# Patient Record
Sex: MALE | Race: WHITE | NOT HISPANIC OR LATINO | Employment: OTHER | URBAN - METROPOLITAN AREA
[De-identification: names, ages, dates, MRNs, and addresses within clinical notes are randomized per-mention and may not be internally consistent; named-entity substitution may affect disease eponyms.]

---

## 2018-03-28 ENCOUNTER — OFFICE VISIT (OUTPATIENT)
Dept: CARDIOLOGY | Facility: CLINIC | Age: 73
End: 2018-03-28

## 2018-03-28 VITALS
HEIGHT: 71 IN | BODY MASS INDEX: 43.96 KG/M2 | DIASTOLIC BLOOD PRESSURE: 90 MMHG | HEART RATE: 63 BPM | WEIGHT: 314 LBS | SYSTOLIC BLOOD PRESSURE: 160 MMHG

## 2018-03-28 DIAGNOSIS — R93.1 ABNORMAL NUCLEAR CARDIAC IMAGING TEST: Primary | ICD-10-CM

## 2018-03-28 PROCEDURE — 99204 OFFICE O/P NEW MOD 45 MIN: CPT | Performed by: INTERNAL MEDICINE

## 2018-03-30 PROCEDURE — 93000 ELECTROCARDIOGRAM COMPLETE: CPT | Performed by: INTERNAL MEDICINE

## 2018-03-30 NOTE — PROGRESS NOTES
Subjective:     Encounter Date:03/28/2018      Patient ID: Jake Alberts is a 72 y.o. male.    Chief Complaint:  Shortness of Breath   This is a recurrent problem. The current episode started more than 1 month ago. The problem occurs intermittently. The problem has been waxing and waning. Exacerbated by: Occurs in the evening with rest.     This is a 72-year-old gentleman who was referred to my office today for evaluation of both dyspnea and chest discomfort.  He describes the shortness of breath occurring mostly in the evening when he is at rest.  He says is increasing in frequency.  He underwent a nuclear perfusion study in February 2018.  Stress test was read with a normal ejection fraction and a small fixed apical defect with no evidence of ischemia.  With this stress test he was referred for further evaluation from a cardiovascular standpoint.      Review of Systems   Respiratory: Positive for shortness of breath.    Musculoskeletal: Positive for joint pain.   All other systems reviewed and are negative.        ECG 12 Lead  Date/Time: 3/30/2018 1:26 PM  Performed by: RAINE LUNDY  Authorized by: RAINE LUNDY   Comparison: compared with previous ECG from 1/29/2015  Rhythm: sinus rhythm  T flattening: V3, V4, V5 and V6  Clinical impression: abnormal ECG               Objective:     Physical Exam   Constitutional: He is oriented to person, place, and time. He appears well-developed.   HENT:   Head: Normocephalic.   Eyes: Conjunctivae are normal.   Neck: Normal range of motion.   Cardiovascular: Normal rate, regular rhythm and normal heart sounds.    Pulmonary/Chest: Breath sounds normal.   Abdominal: Soft. Bowel sounds are normal.   Musculoskeletal: Normal range of motion. He exhibits no edema.   Neurological: He is alert and oriented to person, place, and time.   Skin: Skin is warm and dry.   Psychiatric: He has a normal mood and affect. His behavior is normal.   Vitals reviewed.      Lab  Review:       Assessment:         No diagnosis found.       Plan:       1.  Shortness of breath.  Patient's echocardiogram was normal.  The stress test represents a low risk stress test.  At this point I do not think his chest discomfort/shortness of breath is secondary to a cardiac etiology.  I encouraged exercise see what evolves.  He also has been recently diagnosed with sleep apnea and is in the process of getting set up for his CPAP mask.  Another issue that could be the etiology of his symptoms is reflux.  I would defer this to your office.  At this point, we will get treated for sleep apnea encouraged exercise follow-up in 3 months for reassessment and see what clinically evolves.  I did review the records from Dr. Farooq's his prior cardiologist.  He was cathetered back in 2000 and also in 2002 he had a small inferior defect at that time.  I believe these artifacts we'll see what evolves reassess in 3 months

## 2018-07-03 ENCOUNTER — OFFICE VISIT (OUTPATIENT)
Dept: CARDIOLOGY | Facility: CLINIC | Age: 73
End: 2018-07-03

## 2018-07-03 VITALS
SYSTOLIC BLOOD PRESSURE: 150 MMHG | DIASTOLIC BLOOD PRESSURE: 90 MMHG | HEIGHT: 71 IN | BODY MASS INDEX: 43.12 KG/M2 | WEIGHT: 308 LBS | HEART RATE: 61 BPM

## 2018-07-03 DIAGNOSIS — I10 ESSENTIAL HYPERTENSION: ICD-10-CM

## 2018-07-03 DIAGNOSIS — I25.10 CORONARY ARTERY DISEASE INVOLVING NATIVE CORONARY ARTERY OF NATIVE HEART WITHOUT ANGINA PECTORIS: Primary | ICD-10-CM

## 2018-07-03 PROCEDURE — 99213 OFFICE O/P EST LOW 20 MIN: CPT | Performed by: INTERNAL MEDICINE

## 2018-07-03 PROCEDURE — 93000 ELECTROCARDIOGRAM COMPLETE: CPT | Performed by: INTERNAL MEDICINE

## 2018-07-03 RX ORDER — PANTOPRAZOLE SODIUM 40 MG/1
40 TABLET, DELAYED RELEASE ORAL DAILY
Qty: 30 TABLET | Refills: 0 | Status: SHIPPED | OUTPATIENT
Start: 2018-07-03 | End: 2020-02-05

## 2018-07-03 NOTE — PROGRESS NOTES
Subjective:     Encounter Date:03/28/2018      Patient ID: Jake Alberts is a 72 y.o. male.    Chief Complaint:  Shortness of Breath   This is a recurrent problem. The current episode started more than 1 month ago. The problem occurs intermittently. The problem has been waxing and waning. Exacerbated by: Occurs in the evening with rest.     This is a 72-year-old gentleman who was referred to my office today for evaluation of both dyspnea and chest discomfort.  He describes the shortness of breath occurring mostly in the evening when he is at rest.  He says is increasing in frequency.  He underwent a nuclear perfusion study in February 2018.  Stress test was read with a normal ejection fraction and a small fixed apical defect with no evidence of ischemia.  With this stress test he was referred for further evaluation from a cardiovascular standpoint.      Review of Systems   Respiratory: Positive for shortness of breath.    Musculoskeletal: Positive for joint pain.   All other systems reviewed and are negative.        ECG 12 Lead  Date/Time: 7/3/2018 11:54 AM  Performed by: RAINE LUNDY  Authorized by: RAINE LUNDY   Comparison: compared with previous ECG from 3/28/2018  Similar to previous ECG  Rhythm: sinus rhythm  Clinical impression: normal ECG               Objective:     Physical Exam   Constitutional: He is oriented to person, place, and time. He appears well-developed.   HENT:   Head: Normocephalic.   Eyes: Conjunctivae are normal.   Neck: Normal range of motion.   Cardiovascular: Normal rate, regular rhythm and normal heart sounds.    Pulmonary/Chest: Breath sounds normal.   Abdominal: Soft. Bowel sounds are normal.   Musculoskeletal: Normal range of motion. He exhibits no edema.   Neurological: He is alert and oriented to person, place, and time.   Skin: Skin is warm and dry.   Psychiatric: He has a normal mood and affect. His behavior is normal.   Vitals reviewed.      Lab Review:        Assessment:          Diagnosis Plan   1. Coronary artery disease involving native coronary artery of native heart without angina pectoris     2. Essential hypertension            Plan:       1.  Patient continues to have some chest discomfort he thinks it could be indigestion and has not changed since I saw him last.  His EKG has also not changed.  I was able to get his perfusion study that showed a small apical defect that could represent some thinning.  In light of that I think the best approach is to try an empiric trial of Protonix.  If this does not work patient was told to contact me back in about a month and I would set him up for cardiac catheterization.  I did explain the risks and benefits.  2.  Hypertension he says his blood pressure was high today he does check at home frequently and it is running lower.  3.  Mitral regurgitation stable        Coronary Artery Disease  Assessment  • The patient has no angina    Plan  • Lifestyle modifications discussed include adhering to a heart healthy diet, avoidance of tobacco products, maintenance of a healthy weight, medication compliance, regular exercise and regular monitoring of cholesterol and blood pressure    Subjective - Objective  • Current antiplatelet therapy includes aspirin 81 mg

## 2018-12-10 ENCOUNTER — OFFICE VISIT (OUTPATIENT)
Dept: CARDIOLOGY | Facility: CLINIC | Age: 73
End: 2018-12-10

## 2018-12-10 VITALS
WEIGHT: 304 LBS | DIASTOLIC BLOOD PRESSURE: 60 MMHG | SYSTOLIC BLOOD PRESSURE: 120 MMHG | HEIGHT: 70 IN | HEART RATE: 62 BPM | BODY MASS INDEX: 43.52 KG/M2

## 2018-12-10 DIAGNOSIS — I25.10 CORONARY ARTERY DISEASE INVOLVING NATIVE CORONARY ARTERY OF NATIVE HEART WITHOUT ANGINA PECTORIS: ICD-10-CM

## 2018-12-10 DIAGNOSIS — Z99.89 OSA ON CPAP: ICD-10-CM

## 2018-12-10 DIAGNOSIS — G47.33 OSA ON CPAP: ICD-10-CM

## 2018-12-10 DIAGNOSIS — R06.09 DYSPNEA ON EXERTION: ICD-10-CM

## 2018-12-10 DIAGNOSIS — R93.1 ABNORMAL NUCLEAR CARDIAC IMAGING TEST: Primary | ICD-10-CM

## 2018-12-10 DIAGNOSIS — I10 ESSENTIAL HYPERTENSION: ICD-10-CM

## 2018-12-10 DIAGNOSIS — R07.89 ATYPICAL CHEST PAIN: ICD-10-CM

## 2018-12-10 DIAGNOSIS — E66.01 MORBID OBESITY WITH BMI OF 40.0-44.9, ADULT (HCC): ICD-10-CM

## 2018-12-10 PROCEDURE — 99214 OFFICE O/P EST MOD 30 MIN: CPT | Performed by: NURSE PRACTITIONER

## 2018-12-10 PROCEDURE — 93000 ELECTROCARDIOGRAM COMPLETE: CPT | Performed by: NURSE PRACTITIONER

## 2018-12-10 NOTE — H&P (VIEW-ONLY)
Date of Office Visit: 12/10/2018  Encounter Provider: RENETTA Norman  Place of Service: [facility] CARDIOLOGY  Patient Name: Jake Alberts  :1945    Chief Complaint   Patient presents with   • Coronary Artery Disease   • Hypertension   • Sleep Apnea   :     HPI: Jake Alberts is a 73 y.o. male is a patient of Dr. Tinsley. I am seeing him today for the first time and have reviewed his record.     His past medical history is significant of hypertension, hyperlipidemia, TIA, diastolic dysfunction,coronary artery disease, RAFAEL, BPH, and abnormal nuclear cardiac imaging in 2018.  He has a significant history of coronary artery disease with his siblings and mother and father who both  of heart attack.  He quit smoking in .    He a history of negative cardiac catheterization in .     In approximately  he had a TIA and was on Aggrenox for approximately 3 years and then back on aspirin only.    Stress exercise Myoview in 2012 which had ischemic changes which were indeterminate and there was a questionable area of inferior ischemia versus artifact but normal LV systolic function EF 68%.    He began to have dyspnea and chest discomfort.  Shortness of breath occurred mostly in the evening with rest.  This was increasing in frequency and he underwent a nuclear perfusion study in 2018 which showed normal ejection fraction and a small fixed apical defect with no evidence of ischemia.  The echocardiogram was normal.  He was referred for cardiac evaluation area echocardiogram showed normal sinus rhythm with T-wave flattening in V3-V6.    Patient presents today with continued complaint of sharp chest pain which lasted 30 seconds and occurs daily at rest.  He experiences mostly in the evening in the midsternal to left chest area.  Does not have an exertional component however he has shortness of breath with exertion which is also unchanged.  He has  "palpitations which are unchanged.  He wears support socks to manage edema.  He occasionally has lightheadedness when he bends over.  He has chronic back pain and that is why he does not exercise.  Continues to wear CPAP every night.      No Known Allergies    Past Medical History:   Diagnosis Date   • Anemia    • CAD (coronary artery disease)    • Chest pain    • Diastolic dysfunction     Stage I   • Dizziness and giddiness    • Dyspnea    • Hyperlipidemia    • Hypertension    • Left ventricular hypertrophy     Mild concentric   • Mitral regurgitation     Trace   • Pes anserine bursitis    • Prostate cancer (CMS/HCC)    • Rotator cuff tendinitis    • Sinusitis    • TIA (transient ischemic attack) 2009   • Tricuspid regurgitation        Past Surgical History:   Procedure Laterality Date   • CORONARY ANGIOPLASTY  07/2000    JH neg-normal coronaries & LVSF, ?DD, Dr Loo   • KNEE SURGERY  1966   • PROSTATE SURGERY  2014   • SHOULDER SURGERY  2013         Family and social history reviewed.     Review of Systems   Constitution: Positive for malaise/fatigue.   Cardiovascular: Positive for chest pain, dyspnea on exertion and palpitations.   Respiratory: Positive for shortness of breath.      All other systems were reviewed and are negative          Objective:     Vitals:    12/10/18 1352   BP: 120/60   BP Location: Left arm   Patient Position: Sitting   Pulse: 62   Weight: (!) 138 kg (304 lb)   Height: 177.8 cm (70\")     Body mass index is 43.62 kg/m².    PHYSICAL EXAM:  Physical Exam   Constitutional: He is oriented to person, place, and time. He appears well-developed and well-nourished. No distress.   obese   HENT:   Head: Normocephalic.   Eyes: Conjunctivae are normal.   glasses   Neck: Normal range of motion. No JVD present.   Cardiovascular: Normal rate, regular rhythm, normal heart sounds and intact distal pulses.   No murmur heard.  Pulses:       Carotid pulses are 2+ on the right side, and 2+ on the left " side.       Radial pulses are 2+ on the right side, and 2+ on the left side.        Posterior tibial pulses are 2+ on the right side, and 2+ on the left side.   Pulmonary/Chest: Effort normal and breath sounds normal. No respiratory distress. He has no wheezes. He has no rhonchi. He has no rales. He exhibits no tenderness.   Abdominal: Soft. Bowel sounds are normal. He exhibits no distension.   Musculoskeletal: Normal range of motion. He exhibits no edema.   Neurological: He is alert and oriented to person, place, and time.   Skin: Skin is warm, dry and intact. No rash noted. He is not diaphoretic. No cyanosis.   Psychiatric: He has a normal mood and affect. His behavior is normal. Judgment and thought content normal.         ECG 12 Lead  Date/Time: 12/10/2018 2:50 PM  Performed by: Carmen Guerrier APRN  Authorized by: Carmen Guerrier APRN   Comparison: compared with previous ECG from 7/3/2018  Similar to previous ECG  Rhythm: sinus rhythm  Rate: normal  QRS axis: normal  Other findings: prolonged QTc interval  Clinical impression: non-specific ECG  Comments: artifact            Current Outpatient Medications   Medication Sig Dispense Refill   • ASPIR-LOW 81 MG EC tablet      • atenolol (TENORMIN) 50 MG tablet      • Ferrous Gluconate (IRON 27 PO) Take  by mouth.     • lisinopril (PRINIVIL,ZESTRIL) 40 MG tablet      • NIFEdipine CC (ADALAT CC) 60 MG 24 hr tablet      • oxybutynin XL (DITROPAN XL) 15 MG 24 hr tablet      • pantoprazole (PROTONIX) 40 MG EC tablet Take 1 tablet by mouth Daily. 30 tablet 0   • triamterene-hydrochlorothiazide (MAXZIDE-25) 37.5-25 MG per tablet      • vitamin B-12 (CYANOCOBALAMIN) 1000 MCG tablet Take 1,000 mcg by mouth Daily.       No current facility-administered medications for this visit.      Assessment:       Diagnosis Plan   1. Abnormal nuclear cardiac imaging test  Case Request Cath Lab: Left Heart Cath   2. Coronary artery disease involving native coronary artery of native heart  without angina pectoris     3. Essential hypertension     4. RAFAEL on CPAP     5. Morbid obesity with BMI of 40.0-44.9, adult (CMS/[facility])     6. Dyspnea on exertion     7. Atypical chest pain          No orders of the defined types were placed in this encounter.        Plan:         1.  Coronary artery disease with history of cardiac catheterization in 2000.  Myoview exercise stress March 2012 showed ischemic changes with small area of inferior ischemia versus artifact.  Perfusion stress test abnormal February 2018 with small fixed apical defect without ischemia.  He continues to have atypical chest pain as well as shortness of breath with exertion.  He is intermediate risk and will proceed with cardiac catheterization.  2.  Hypertension-controlled continue the same    3.  Hyperlipidemia on pravastatin 40 mg.  He has had his lipid panel checked this year will contact PCP to obtain those records.  4.  TIA in 2009 formerly treated with Aggrenox ×3 years maintained on aspirin 81 mg daily  5.  Sleep apnea compliant with CPAP  6.  Morbid obesity encouraged increased exercise, diet modification, limiting portion size and weight loss.  7.  Reported history of prediabetes       Plan of care reviewed with Dr. Tinsley      It has been a pleasure to participate in this patient's care.      Thank you,  RENETTA Norman      **Live Disclaimer:**  Much of this encounter note is an electronic transcription/translation of spoken language to printed text. The electronic translation of spoken language may permit erroneous, or at times, nonsensical words or phrases to be inadvertently transcribed. Although I have reviewed the note for such errors, some may still exist.

## 2018-12-10 NOTE — PROGRESS NOTES
Date of Office Visit: 12/10/2018  Encounter Provider: RENETTA Norman  Place of Service: [facility] CARDIOLOGY  Patient Name: Jake Alberts  :1945    Chief Complaint   Patient presents with   • Coronary Artery Disease   • Hypertension   • Sleep Apnea   :     HPI: Jake Alberts is a 73 y.o. male is a patient of Dr. Tinsley. I am seeing him today for the first time and have reviewed his record.     His past medical history is significant of hypertension, hyperlipidemia, TIA, diastolic dysfunction,coronary artery disease, RAFAEL, BPH, and abnormal nuclear cardiac imaging in 2018.  He has a significant history of coronary artery disease with his siblings and mother and father who both  of heart attack.  He quit smoking in .    He a history of negative cardiac catheterization in .     In approximately  he had a TIA and was on Aggrenox for approximately 3 years and then back on aspirin only.    Stress exercise Myoview in 2012 which had ischemic changes which were indeterminate and there was a questionable area of inferior ischemia versus artifact but normal LV systolic function EF 68%.    He began to have dyspnea and chest discomfort.  Shortness of breath occurred mostly in the evening with rest.  This was increasing in frequency and he underwent a nuclear perfusion study in 2018 which showed normal ejection fraction and a small fixed apical defect with no evidence of ischemia.  The echocardiogram was normal.  He was referred for cardiac evaluation area echocardiogram showed normal sinus rhythm with T-wave flattening in V3-V6.    Patient presents today with continued complaint of sharp chest pain which lasted 30 seconds and occurs daily at rest.  He experiences mostly in the evening in the midsternal to left chest area.  Does not have an exertional component however he has shortness of breath with exertion which is also unchanged.  He has  "palpitations which are unchanged.  He wears support socks to manage edema.  He occasionally has lightheadedness when he bends over.  He has chronic back pain and that is why he does not exercise.  Continues to wear CPAP every night.      No Known Allergies    Past Medical History:   Diagnosis Date   • Anemia    • CAD (coronary artery disease)    • Chest pain    • Diastolic dysfunction     Stage I   • Dizziness and giddiness    • Dyspnea    • Hyperlipidemia    • Hypertension    • Left ventricular hypertrophy     Mild concentric   • Mitral regurgitation     Trace   • Pes anserine bursitis    • Prostate cancer (CMS/HCC)    • Rotator cuff tendinitis    • Sinusitis    • TIA (transient ischemic attack) 2009   • Tricuspid regurgitation        Past Surgical History:   Procedure Laterality Date   • CORONARY ANGIOPLASTY  07/2000    JH neg-normal coronaries & LVSF, ?DD, Dr Loo   • KNEE SURGERY  1966   • PROSTATE SURGERY  2014   • SHOULDER SURGERY  2013         Family and social history reviewed.     Review of Systems   Constitution: Positive for malaise/fatigue.   Cardiovascular: Positive for chest pain, dyspnea on exertion and palpitations.   Respiratory: Positive for shortness of breath.      All other systems were reviewed and are negative          Objective:     Vitals:    12/10/18 1352   BP: 120/60   BP Location: Left arm   Patient Position: Sitting   Pulse: 62   Weight: (!) 138 kg (304 lb)   Height: 177.8 cm (70\")     Body mass index is 43.62 kg/m².    PHYSICAL EXAM:  Physical Exam   Constitutional: He is oriented to person, place, and time. He appears well-developed and well-nourished. No distress.   obese   HENT:   Head: Normocephalic.   Eyes: Conjunctivae are normal.   glasses   Neck: Normal range of motion. No JVD present.   Cardiovascular: Normal rate, regular rhythm, normal heart sounds and intact distal pulses.   No murmur heard.  Pulses:       Carotid pulses are 2+ on the right side, and 2+ on the left " side.       Radial pulses are 2+ on the right side, and 2+ on the left side.        Posterior tibial pulses are 2+ on the right side, and 2+ on the left side.   Pulmonary/Chest: Effort normal and breath sounds normal. No respiratory distress. He has no wheezes. He has no rhonchi. He has no rales. He exhibits no tenderness.   Abdominal: Soft. Bowel sounds are normal. He exhibits no distension.   Musculoskeletal: Normal range of motion. He exhibits no edema.   Neurological: He is alert and oriented to person, place, and time.   Skin: Skin is warm, dry and intact. No rash noted. He is not diaphoretic. No cyanosis.   Psychiatric: He has a normal mood and affect. His behavior is normal. Judgment and thought content normal.         ECG 12 Lead  Date/Time: 12/10/2018 2:50 PM  Performed by: Carmen Guerrier APRN  Authorized by: Carmen Guerrier APRN   Comparison: compared with previous ECG from 7/3/2018  Similar to previous ECG  Rhythm: sinus rhythm  Rate: normal  QRS axis: normal  Other findings: prolonged QTc interval  Clinical impression: non-specific ECG  Comments: artifact            Current Outpatient Medications   Medication Sig Dispense Refill   • ASPIR-LOW 81 MG EC tablet      • atenolol (TENORMIN) 50 MG tablet      • Ferrous Gluconate (IRON 27 PO) Take  by mouth.     • lisinopril (PRINIVIL,ZESTRIL) 40 MG tablet      • NIFEdipine CC (ADALAT CC) 60 MG 24 hr tablet      • oxybutynin XL (DITROPAN XL) 15 MG 24 hr tablet      • pantoprazole (PROTONIX) 40 MG EC tablet Take 1 tablet by mouth Daily. 30 tablet 0   • triamterene-hydrochlorothiazide (MAXZIDE-25) 37.5-25 MG per tablet      • vitamin B-12 (CYANOCOBALAMIN) 1000 MCG tablet Take 1,000 mcg by mouth Daily.       No current facility-administered medications for this visit.      Assessment:       Diagnosis Plan   1. Abnormal nuclear cardiac imaging test  Case Request Cath Lab: Left Heart Cath   2. Coronary artery disease involving native coronary artery of native heart  without angina pectoris     3. Essential hypertension     4. RAFAEL on CPAP     5. Morbid obesity with BMI of 40.0-44.9, adult (CMS/[facility])     6. Dyspnea on exertion     7. Atypical chest pain          No orders of the defined types were placed in this encounter.        Plan:         1.  Coronary artery disease with history of cardiac catheterization in 2000.  Myoview exercise stress March 2012 showed ischemic changes with small area of inferior ischemia versus artifact.  Perfusion stress test abnormal February 2018 with small fixed apical defect without ischemia.  He continues to have atypical chest pain as well as shortness of breath with exertion.  He is intermediate risk and will proceed with cardiac catheterization.  2.  Hypertension-controlled continue the same    3.  Hyperlipidemia on pravastatin 40 mg.  He has had his lipid panel checked this year will contact PCP to obtain those records.  4.  TIA in 2009 formerly treated with Aggrenox ×3 years maintained on aspirin 81 mg daily  5.  Sleep apnea compliant with CPAP  6.  Morbid obesity encouraged increased exercise, diet modification, limiting portion size and weight loss.  7.  Reported history of prediabetes       Plan of care reviewed with Dr. Tinsley      It has been a pleasure to participate in this patient's care.      Thank you,  RENETTA Norman      **Live Disclaimer:**  Much of this encounter note is an electronic transcription/translation of spoken language to printed text. The electronic translation of spoken language may permit erroneous, or at times, nonsensical words or phrases to be inadvertently transcribed. Although I have reviewed the note for such errors, some may still exist.

## 2018-12-14 ENCOUNTER — HOSPITAL ENCOUNTER (OUTPATIENT)
Dept: LAB | Facility: HOSPITAL | Age: 73
Discharge: HOME | End: 2018-12-14

## 2018-12-14 LAB
ANION GAP SERPL CALC-SCNC: 12.8 MMOL/L (ref 10–20)
BASOPHILS # BLD AUTO: 0.1 10*3/UL (ref 0–0.2)
BASOPHILS NFR BLD AUTO: 1 % (ref 0–2)
BUN SERPL-MCNC: 23 MG/DL (ref 8–20)
BUN/CREAT SERPL: 19.2 (ref 6.2–20.3)
CALCIUM SERPL-MCNC: 9.2 MG/DL (ref 8.9–10.3)
CHLORIDE SERPL-SCNC: 101 MMOL/L (ref 101–111)
CONV CO2: 25 MMOL/L (ref 22–32)
CREAT UR-MCNC: 1.2 MG/DL (ref 0.7–1.2)
DIFFERENTIAL METHOD BLD: (no result)
EOSINOPHIL # BLD AUTO: 0.2 10*3/UL (ref 0–0.3)
EOSINOPHIL # BLD AUTO: 2 % (ref 0–3)
ERYTHROCYTE [DISTWIDTH] IN BLOOD BY AUTOMATED COUNT: 15.6 % (ref 11.5–14.5)
GLUCOSE SERPL-MCNC: 96 MG/DL (ref 65–99)
HCT VFR BLD AUTO: 40.8 % (ref 40–54)
HGB BLD-MCNC: 13.7 G/DL (ref 14–18)
LYMPHOCYTES # BLD AUTO: 2 10*3/UL (ref 0.8–4.8)
LYMPHOCYTES NFR BLD AUTO: 19 % (ref 18–42)
MCH RBC QN AUTO: 28.7 PG (ref 26–32)
MCHC RBC AUTO-ENTMCNC: 33.4 G/DL (ref 32–36)
MCV RBC AUTO: 86 FL (ref 80–94)
MONOCYTES # BLD AUTO: 0.8 10*3/UL (ref 0.1–1.3)
MONOCYTES NFR BLD AUTO: 8 % (ref 2–11)
NEUTROPHILS # BLD AUTO: 7.5 10*3/UL (ref 2.3–8.6)
NEUTROPHILS NFR BLD AUTO: 70 % (ref 50–75)
NRBC BLD AUTO-RTO: 0 /100{WBCS}
NRBC/RBC NFR BLD MANUAL: 0 10*3/UL
PLATELET # BLD AUTO: 263 10*3/UL (ref 150–450)
PMV BLD AUTO: 8.7 FL (ref 7.4–10.4)
POTASSIUM SERPL-SCNC: 3.8 MMOL/L (ref 3.6–5.1)
RBC # BLD AUTO: 4.75 10*6/UL (ref 4.6–6)
SODIUM SERPL-SCNC: 135 MMOL/L (ref 136–144)
WBC # BLD AUTO: 10.5 10*3/UL (ref 4.5–11.5)

## 2018-12-18 ENCOUNTER — HOSPITAL ENCOUNTER (OUTPATIENT)
Facility: HOSPITAL | Age: 73
Setting detail: HOSPITAL OUTPATIENT SURGERY
Discharge: HOME | End: 2018-12-18
Attending: INTERNAL MEDICINE | Admitting: INTERNAL MEDICINE

## 2018-12-18 VITALS
OXYGEN SATURATION: 95 % | WEIGHT: 300 LBS | SYSTOLIC BLOOD PRESSURE: 147 MMHG | TEMPERATURE: 99.2 F | BODY MASS INDEX: 42.95 KG/M2 | RESPIRATION RATE: 16 BRPM | DIASTOLIC BLOOD PRESSURE: 78 MMHG | HEIGHT: 70 IN | HEART RATE: 58 BPM

## 2018-12-18 DIAGNOSIS — R93.1 ABNORMAL NUCLEAR CARDIAC IMAGING TEST: ICD-10-CM

## 2018-12-18 PROCEDURE — 0 IOPAMIDOL PER 1 ML: Performed by: INTERNAL MEDICINE

## 2018-12-18 PROCEDURE — C1894 INTRO/SHEATH, NON-LASER: HCPCS | Performed by: INTERNAL MEDICINE

## 2018-12-18 PROCEDURE — 99152 MOD SED SAME PHYS/QHP 5/>YRS: CPT | Performed by: INTERNAL MEDICINE

## 2018-12-18 PROCEDURE — 93458 L HRT ARTERY/VENTRICLE ANGIO: CPT | Performed by: INTERNAL MEDICINE

## 2018-12-18 PROCEDURE — 25010000002 HEPARIN (PORCINE) PER 1000 UNITS: Performed by: INTERNAL MEDICINE

## 2018-12-18 PROCEDURE — 25010000002 FENTANYL CITRATE (PF) 100 MCG/2ML SOLUTION: Performed by: INTERNAL MEDICINE

## 2018-12-18 PROCEDURE — 25010000002 MIDAZOLAM PER 1 MG: Performed by: INTERNAL MEDICINE

## 2018-12-18 PROCEDURE — C1769 GUIDE WIRE: HCPCS | Performed by: INTERNAL MEDICINE

## 2018-12-18 RX ORDER — PRAVASTATIN SODIUM 40 MG
40 TABLET ORAL NIGHTLY
COMMUNITY

## 2018-12-18 RX ORDER — LIDOCAINE HYDROCHLORIDE 20 MG/ML
INJECTION, SOLUTION INFILTRATION; PERINEURAL AS NEEDED
Status: DISCONTINUED | OUTPATIENT
Start: 2018-12-18 | End: 2018-12-18 | Stop reason: HOSPADM

## 2018-12-18 RX ORDER — SODIUM CHLORIDE 9 MG/ML
100 INJECTION, SOLUTION INTRAVENOUS CONTINUOUS
Status: DISCONTINUED | OUTPATIENT
Start: 2018-12-18 | End: 2018-12-18 | Stop reason: HOSPADM

## 2018-12-18 RX ORDER — ACETAMINOPHEN 325 MG/1
650 TABLET ORAL EVERY 4 HOURS PRN
Status: CANCELLED | OUTPATIENT
Start: 2018-12-18

## 2018-12-18 RX ORDER — SODIUM CHLORIDE 0.9 % (FLUSH) 0.9 %
3 SYRINGE (ML) INJECTION EVERY 12 HOURS SCHEDULED
Status: DISCONTINUED | OUTPATIENT
Start: 2018-12-18 | End: 2018-12-18 | Stop reason: HOSPADM

## 2018-12-18 RX ORDER — LIDOCAINE HYDROCHLORIDE 10 MG/ML
0.1 INJECTION, SOLUTION EPIDURAL; INFILTRATION; INTRACAUDAL; PERINEURAL ONCE AS NEEDED
Status: DISCONTINUED | OUTPATIENT
Start: 2018-12-18 | End: 2018-12-18 | Stop reason: HOSPADM

## 2018-12-18 RX ORDER — HYDROCODONE BITARTRATE AND ACETAMINOPHEN 5; 325 MG/1; MG/1
1 TABLET ORAL EVERY 4 HOURS PRN
Status: CANCELLED | OUTPATIENT
Start: 2018-12-18

## 2018-12-18 RX ORDER — SODIUM CHLORIDE 9 MG/ML
75 INJECTION, SOLUTION INTRAVENOUS CONTINUOUS
Status: DISCONTINUED | OUTPATIENT
Start: 2018-12-18 | End: 2018-12-18 | Stop reason: HOSPADM

## 2018-12-18 RX ORDER — FENTANYL CITRATE 50 UG/ML
INJECTION, SOLUTION INTRAMUSCULAR; INTRAVENOUS AS NEEDED
Status: DISCONTINUED | OUTPATIENT
Start: 2018-12-18 | End: 2018-12-18 | Stop reason: HOSPADM

## 2018-12-18 RX ORDER — MIDAZOLAM HYDROCHLORIDE 1 MG/ML
INJECTION INTRAMUSCULAR; INTRAVENOUS AS NEEDED
Status: DISCONTINUED | OUTPATIENT
Start: 2018-12-18 | End: 2018-12-18 | Stop reason: HOSPADM

## 2018-12-18 RX ORDER — SODIUM CHLORIDE 0.9 % (FLUSH) 0.9 %
3-10 SYRINGE (ML) INJECTION AS NEEDED
Status: DISCONTINUED | OUTPATIENT
Start: 2018-12-18 | End: 2018-12-18 | Stop reason: HOSPADM

## 2018-12-18 RX ORDER — ONDANSETRON 2 MG/ML
4 INJECTION INTRAMUSCULAR; INTRAVENOUS EVERY 6 HOURS PRN
Status: CANCELLED | OUTPATIENT
Start: 2018-12-18

## 2018-12-18 RX ORDER — ONDANSETRON 4 MG/1
4 TABLET, ORALLY DISINTEGRATING ORAL EVERY 6 HOURS PRN
Status: CANCELLED | OUTPATIENT
Start: 2018-12-18

## 2018-12-18 RX ORDER — ONDANSETRON 4 MG/1
4 TABLET, FILM COATED ORAL EVERY 6 HOURS PRN
Status: CANCELLED | OUTPATIENT
Start: 2018-12-18

## 2018-12-18 RX ADMIN — SODIUM CHLORIDE 75 ML/HR: 9 INJECTION, SOLUTION INTRAVENOUS at 07:26

## 2018-12-18 NOTE — DISCHARGE INSTRUCTIONS
[facility]  [address] [place] [address]    Coronary Angiogram (Radial/Ulnar Approach) After Care    Refer to this sheet in the next few weeks. These instructions provide you with information on caring for yourself after your procedure. Your caregiver may also give you more specific instructions. Your treatment has been planned according to current medical practices, but problems sometimes occur. Call your caregiver if you have any problems or questions after your procedure.    Home Care Instructions:  · You may shower the day after the procedure. Remove the bandage (dressing) and gently wash the site with plain soap and water. Gently pat the site dry. You may apply a band aid daily for 2 days if desired.    · Do not apply powder or lotion to the site.  · Do not submerge the affected site in water for 3 to 5 days or until the site is completely healed.   · Do not lift, push or pull anything over 10 pounds for 2 days after your procedure.  · Inspect the site at least twice daily. You may notice some bruising at the site and it may be tender for 1 to 2 weeks.     · Increase your fluid intake for the next 2 days.    · Keep arm elevated for 24 hours. For the remainder of the day, keep your arm in “Pledge of Allegiance” position when up and about.     · You may drive 24 hours after the procedure unless otherwise instructed by your caregiver.  · Do not operate machinery or power tools for 24 hours.  · A responsible adult should be with you for the first 24 hours after you arrive home. Do not make any important legal decisions or sign legal papers for 24 hours.  Do not drink alcohol for 24 hours.    · Metformin or any medications containing Metformin should not be taken for 48 hours after your procedure.      Call Your Doctor if:   · You have unusual pain at the radial/ulnar (wrist) site.  · You have redness, warmth, swelling, or pain at the radial/ulnar (wrist) site.  · You have drainage (other  than a small amount of blood on the dressing).  · You have chills or a fever > 101.  · Your arm becomes pale or dark, cool, tingly, or numb.  · You have heavy bleeding from the site, hold pressure on the site for 20 minutes.  If the bleeding stops, apply a fresh bandage and call your cardiologist.  However, if you continue to have bleeding, call 911.

## 2019-02-01 ENCOUNTER — OFFICE VISIT (OUTPATIENT)
Dept: CARDIOLOGY | Facility: CLINIC | Age: 74
End: 2019-02-01

## 2019-02-01 VITALS
HEIGHT: 70 IN | BODY MASS INDEX: 41.66 KG/M2 | WEIGHT: 291 LBS | HEART RATE: 53 BPM | SYSTOLIC BLOOD PRESSURE: 130 MMHG | DIASTOLIC BLOOD PRESSURE: 80 MMHG

## 2019-02-01 DIAGNOSIS — I10 ESSENTIAL HYPERTENSION: Primary | ICD-10-CM

## 2019-02-01 DIAGNOSIS — I25.10 CORONARY ARTERY DISEASE INVOLVING NATIVE CORONARY ARTERY OF NATIVE HEART WITHOUT ANGINA PECTORIS: ICD-10-CM

## 2019-02-01 PROCEDURE — 99213 OFFICE O/P EST LOW 20 MIN: CPT | Performed by: INTERNAL MEDICINE

## 2019-02-01 PROCEDURE — 93000 ELECTROCARDIOGRAM COMPLETE: CPT | Performed by: INTERNAL MEDICINE

## 2019-02-01 NOTE — PROGRESS NOTES
Subjective:     Encounter Date:02/01/2019      Patient ID: Jake Alberts is a 73 y.o. male.    Chief Complaint:  Coronary Artery Disease   Presents for follow-up visit. Pertinent negatives include no chest pain or chest pressure. The symptoms have been stable. Compliance with diet is good. Compliance with exercise is poor. Compliance with medications is good.   Hypertension   This is a chronic problem. The problem is controlled. Pertinent negatives include no chest pain.     73-year-old gentleman who presents today for reevaluation.  he underwent a cardiac catheterization and was found to have mild nonobstructive coronary artery disease.  Clinically he is doing significantly better.  His chest pains have resolved he has a little bit of swelling in his ankles.  Unfortunately he has not been exercising much but this is more due to back discomfort.  He is scheduled to see a physician about this in the next week or so.  Overall he is doing better.    Review of Systems   Cardiovascular: Negative for chest pain.   All other systems reviewed and are negative.        ECG 12 Lead  Date/Time: 2/1/2019 11:04 AM  Performed by: Joshua Tinsley MD  Authorized by: Joshua Tinsley MD   Comparison: compared with previous ECG from 12/10/2018  Similar to previous ECG  Rhythm: sinus bradycardia  Clinical impression: non-specific ECG              Conclusions:   1. Left main: Diffuse 20% stenosis  2. LAD: 20% mid vessel stenosis  3. LCX: 20-30% proximal stenosis  4. RCA: Tubular 40% mid vessel stenosis  5.  Normal left ventricular size and systolic function  6.  Elevated left ventricular end-diastolic pressure.     Recommendations: Weight loss.  Continue medical management.  Objective:     Physical Exam   Constitutional: He is oriented to person, place, and time. He appears well-developed.   HENT:   Head: Normocephalic.   Eyes: Conjunctivae are normal.   Neck: Normal range of motion.   Cardiovascular: Normal rate, regular  rhythm and normal heart sounds.   Pulmonary/Chest: Breath sounds normal.   Abdominal: Soft. Bowel sounds are normal.   Musculoskeletal: Normal range of motion. He exhibits no edema.   Neurological: He is alert and oriented to person, place, and time.   Skin: Skin is warm and dry.   Psychiatric: He has a normal mood and affect. His behavior is normal.   Vitals reviewed.      Lab Review:       Assessment:          Diagnosis Plan   1. Essential hypertension     2. Coronary artery disease involving native coronary artery of native heart without angina pectoris            Plan:       1.  CAD.  Stable encouraged exercise he is going to try however due to orthopedic issues which he is getting evaluated for he is limited.  He is working on losing weight is lost about 9 pounds so far.  2.  Hypertension blood pressure is good  3.  Would see patient back in about a year sooner of course if he develops issues.    Coronary Artery Disease  Assessment  • The patient has no angina    Plan  • Lifestyle modifications discussed include adhering to a heart healthy diet, avoidance of tobacco products, maintenance of a healthy weight, medication compliance, regular exercise and regular monitoring of cholesterol and blood pressure    Subjective - Objective  • Current antiplatelet therapy includes aspirin 81 mg

## 2020-02-05 ENCOUNTER — APPOINTMENT (OUTPATIENT)
Dept: PREADMISSION TESTING | Facility: HOSPITAL | Age: 75
End: 2020-02-05

## 2020-02-05 VITALS
WEIGHT: 308 LBS | OXYGEN SATURATION: 93 % | BODY MASS INDEX: 44.09 KG/M2 | HEIGHT: 70 IN | SYSTOLIC BLOOD PRESSURE: 141 MMHG | RESPIRATION RATE: 16 BRPM | HEART RATE: 55 BPM | DIASTOLIC BLOOD PRESSURE: 68 MMHG | TEMPERATURE: 96.8 F

## 2020-02-05 LAB
ANION GAP SERPL CALCULATED.3IONS-SCNC: 13.8 MMOL/L (ref 5–15)
BUN BLD-MCNC: 23 MG/DL (ref 8–23)
BUN/CREAT SERPL: 21.9 (ref 7–25)
CALCIUM SPEC-SCNC: 9.2 MG/DL (ref 8.6–10.5)
CHLORIDE SERPL-SCNC: 102 MMOL/L (ref 98–107)
CO2 SERPL-SCNC: 25.2 MMOL/L (ref 22–29)
CREAT BLD-MCNC: 1.05 MG/DL (ref 0.76–1.27)
DEPRECATED RDW RBC AUTO: 42.6 FL (ref 37–54)
ERYTHROCYTE [DISTWIDTH] IN BLOOD BY AUTOMATED COUNT: 13.9 % (ref 12.3–15.4)
GFR SERPL CREATININE-BSD FRML MDRD: 69 ML/MIN/1.73
GLUCOSE BLD-MCNC: 135 MG/DL (ref 65–99)
HCT VFR BLD AUTO: 40.9 % (ref 37.5–51)
HGB BLD-MCNC: 13.6 G/DL (ref 13–17.7)
MCH RBC QN AUTO: 28.2 PG (ref 26.6–33)
MCHC RBC AUTO-ENTMCNC: 33.3 G/DL (ref 31.5–35.7)
MCV RBC AUTO: 84.9 FL (ref 79–97)
PLATELET # BLD AUTO: 209 10*3/MM3 (ref 140–450)
PMV BLD AUTO: 10.9 FL (ref 6–12)
POTASSIUM BLD-SCNC: 3.8 MMOL/L (ref 3.5–5.2)
RBC # BLD AUTO: 4.82 10*6/MM3 (ref 4.14–5.8)
SODIUM BLD-SCNC: 141 MMOL/L (ref 136–145)
WBC NRBC COR # BLD: 9.56 10*3/MM3 (ref 3.4–10.8)

## 2020-02-05 PROCEDURE — 93005 ELECTROCARDIOGRAM TRACING: CPT

## 2020-02-05 PROCEDURE — 93010 ELECTROCARDIOGRAM REPORT: CPT | Performed by: INTERNAL MEDICINE

## 2020-02-05 PROCEDURE — 85027 COMPLETE CBC AUTOMATED: CPT | Performed by: UROLOGY

## 2020-02-05 PROCEDURE — 80048 BASIC METABOLIC PNL TOTAL CA: CPT | Performed by: UROLOGY

## 2020-02-05 PROCEDURE — 36415 COLL VENOUS BLD VENIPUNCTURE: CPT

## 2020-02-05 NOTE — DISCHARGE INSTRUCTIONS
Take the following medications the morning of surgery with a small sip of water:    ATENOLOL, NIFEDIPINE    ARRIVE TO MAIN SURGERY AT 10:30 AM ON 2/12/2020    General Instructions:  • Do not eat or drink anything after midnight the night before surgery.  • Infants may have breast milk up to four hours before surgery.  • Infants drinking formula may drink formula up to six hours before surgery.   • Patients who avoid smoking, chewing tobacco and alcohol for 4 weeks prior to surgery have a reduced risk of post-operative complications.  Quit smoking as many days before surgery as you can.  • Do not smoke, use chewing tobacco or drink alcohol the day of surgery.   • If applicable bring your C-PAP/ BI-PAP machine.  • Bring any papers given to you in the doctor’s office.  • Wear clean comfortable clothes.  • Do not wear contact lenses, false eyelashes or make-up.  Bring a case for your glasses.   • Bring crutches or walker if applicable.  • Remove all piercings.  Leave jewelry and any other valuables at home.  • Hair extensions with metal clips must be removed prior to surgery.  • The Pre-Admission Testing nurse will instruct you to bring medications if unable to obtain an accurate list in Pre-Admission Testing.            Preventing a Surgical Site Infection:  • For 2 to 3 days before surgery, avoid shaving with a razor because the razor can irritate skin and make it easier to develop an infection.    • Any areas of open skin can increase the risk of a post-operative wound infection by allowing bacteria to enter and travel throughout the body.  Notify your surgeon if you have any skin wounds / rashes even if it is not near the expected surgical site.  The area will need assessed to determine if surgery should be delayed until it is healed.  • The night prior to surgery sleep in a clean bed with clean clothing.  Do not allow pets to sleep with you.  • Shower on the morning of surgery using a fresh bar of anti-bacterial  soap (such as Dial) and clean washcloth.  Dry with a clean towel and dress in clean clothing.  • Ask your surgeon if you will be receiving antibiotics prior to surgery.  • Make sure you, your family, and all healthcare providers clean their hands with soap and water or an alcohol based hand  before caring for you or your wound.    Day of surgery:  Your arrival time is approximately two hours before your scheduled surgery time.  Upon arrival, a Pre-op nurse and Anesthesiologist will review your health history, obtain vital signs, and answer questions you may have.  The only belongings needed at this time will be your home medications and if applicable your C-PAP/BI-PAP machine.  If you are staying overnight your family can leave the rest of your belongings in the car and bring them to your room later.  A Pre-op nurse will start an IV and you may receive medication in preparation for surgery, including something to help you relax.  Your family will be able to see you in the Pre-op area.  Two visitors at a time will be allowed in the Pre-op room.  While you are in surgery your family should notify the waiting room  if they leave the waiting room area and provide a contact phone number.    Please be aware that surgery does come with discomfort.  We want to make every effort to control your discomfort so please discuss any uncontrolled symptoms with your nurse.   Your doctor will most likely have prescribed pain medications.      If you are going home after surgery you will receive individualized written care instructions before being discharged.  A responsible adult must drive you to and from the hospital on the day of your surgery and stay with you for 24 hours.    If you are staying overnight following surgery, you will be transported to your hospital room following the recovery period.  [facility] has all private rooms.    If you have any questions please call Pre-Admission Testing  at [phone].  Deductibles and co-payments are collected on the day of service. Please be prepared to pay the required co-pay, deductible or deposit on the day of service as defined by your plan.

## 2020-02-06 ENCOUNTER — TELEPHONE (OUTPATIENT)
Dept: CARDIOLOGY | Facility: CLINIC | Age: 75
End: 2020-02-06

## 2020-02-06 NOTE — TELEPHONE ENCOUNTER
Pt is having surgery on 2/12/2020 and wanted to know if he needed to come in for surgery clearance? Pt wasl last seen 2/2019.Thanks

## 2020-02-10 PROBLEM — E66.01 MORBID OBESITY WITH BMI OF 40.0-44.9, ADULT (HCC): Status: ACTIVE | Noted: 2020-02-10

## 2020-02-10 PROBLEM — Z01.810 PRE-OPERATIVE CARDIOVASCULAR EXAMINATION: Status: ACTIVE | Noted: 2020-02-10

## 2020-02-10 NOTE — PROGRESS NOTES
Date of Office Visit: 20  Encounter Provider: RENETTA England  Primary Cardiologist: Dr. Tinsley  Place of Service: [facility] CARD[facility]  Patient Name: Jake Alberts  :1945      Subjective:     Chief Complaint:  Preoperative evalution    History of Present Illness:  Jake Alberts is a pleasant 74 y.o. male who is new to me .  Outside records have been obtained and reviewed by me.     This is a patient of Dr. Tinsley with history of coronary artery disease, valvular disease, morbid obesity, obstructive sleep apnea, TIA, hypertension, hyperlipidemia, diastolic dysfunction.  He is [employer].    2018 patient had an echo that was technically difficult with normal biventricular chamber size and systolic function with an EF of 55 to 60% with stage I diastolic dysfunction, no significant valvular abnormalities, dilated inferior vena cava with elevated right-sided pressures.    2018 patient had cardiac catheterization which showed diffuse 20% left main stenosis with mild calcification, mild calcification and mild luminal irregularities of proximal LAD with 10 to 20% mid LAD stenosis with mild calcification, 20 to 30% proximal left circumflex stenosis, RCA large caliber dominant vessel with a tubular 40% mid vessel stenosis he had normal LV size and systolic function with EF of 55% normal wall motion on his left ventriculogram.    2019 patient was last in the office to see Dr. Tinsley.He was overall doing better.  His chest pains had resolved.  He had some swelling in his ankles.  He had not been exercising much.  It was recommended that he increase his physical activity that was limited by his orthopedic issues.  He had apparently lost 9 pounds so far and his weight loss journey.  Dr. Tinsley made no changes and recommended a yearly follow-up.    Patient is scheduled tomorrow for insertion of artificial urinary sphincter to help with  urinary leakage and he is presenting today for preoperative evaluation. 2/5/2020 patient had a BMP that showed normal creatinine and renal function.  Glucose slightly elevated 135, CBC was within normal limits.  At today's visit he has been doing okay since his last visit.  We stopped his aspirin last week in preparation of his upcoming procedure.  He is not having any exertional chest pain or shortness of breath.  He will have an echo occasional random pain in the center of his chest that is mild, nonradiating and not associated with other symptoms.  It typically occurs at rest lasting maximum 5 minutes.  He cannot identify any triggering factors and it typically resolves on its own.  He has some chronic lower extremity edema that is better in the morning and improves with his compression socks.  He denies any PND, orthopnea, palpitations, racing heartbeat or skipped beats, dizziness, lightheadedness, syncope, near syncope or significant fatigue.  He tells me he has lost about 8 or 9 pounds since Abdoulaye though his weight appears to be up about 10 pounds per our scale.  He helps care for his wife who sustained an injury about a year and a half ago and needs a lot of assistance.  He is able to climb up and down 15 stairs at home without any limiting symptoms.  He is able to vacuum and do chores without symptoms.  He is not doing any formal exercising however.      Past Medical History:   Diagnosis Date   • Anemia    • CAD (coronary artery disease)    • Chest pain    • Diastolic dysfunction     Stage I   • Dizziness and giddiness    • Dyspnea    • Hyperlipidemia    • Hypertension    • Left ventricular hypertrophy     Mild concentric   • Mitral regurgitation     Trace   • Morbid obesity with BMI of 40.0-44.9, adult (CMS/[facility])    • RAFAEL on CPAP     CPAP OFF/ON   • Pes anserine bursitis    • Prostate cancer (CMS/[facility])    • Rotator cuff tendinitis    • Sinusitis    • TIA (transient ischemic attack) 2009   • Tricuspid  regurgitation      Past Surgical History:   Procedure Laterality Date   • CARDIAC CATHETERIZATION N/A 12/18/2018    Procedure: Left Heart Cath;  Surgeon: Paxton Grant MD;  Location:  [facility];  Service: Cardiovascular   • CARDIAC CATHETERIZATION N/A 12/18/2018    Procedure: Coronary angiography;  Surgeon: Paxton Grant MD;  Location:  [facility];  Service: Cardiovascular   • CARDIAC CATHETERIZATION N/A 12/18/2018    Procedure: Left ventriculography;  Surgeon: Paxton Grant MD;  Location:  [facility];  Service: Cardiovascular   • COLONOSCOPY N/A 12/5/2016    Procedure: COLONOSCOPY with polypectomy (cold biopsy);  Surgeon: Alex Gallego MD;  Location: [facility] ENDOSCOPY;  Service:    • EYE SURGERY      CATARACTS   • KNEE SURGERY Left 1966   • PROSTATE SURGERY  2014   • SHOULDER SURGERY Left 2013     Outpatient Medications Prior to Visit   Medication Sig Dispense Refill   • ASPIR-LOW 81 MG EC tablet Take 81 mg by mouth Every Night.     • atenolol (TENORMIN) 50 MG tablet Take 50 mg by mouth Daily.     • Cyanocobalamin (VITAMIN B-12 PO) Take 2,500 mcg by mouth Daily.     • ferrous sulfate 325 (65 Fe) MG tablet Take 325 mg by mouth.     • lisinopril (PRINIVIL,ZESTRIL) 40 MG tablet Take 40 mg by mouth Daily.     • NIFEdipine CC (ADALAT CC) 60 MG 24 hr tablet Take 60 mg by mouth Daily.     • oxybutynin XL (DITROPAN XL) 15 MG 24 hr tablet Take 15 mg by mouth Daily.     • pravastatin (PRAVACHOL) 40 MG tablet Take 40 mg by mouth Daily.     • triamterene-hydrochlorothiazide (MAXZIDE-25) 37.5-25 MG per tablet Take 1 tablet by mouth Daily.     • Ferrous Gluconate (IRON 27 PO) Take 1 tablet by mouth Daily.       No facility-administered medications prior to visit.        Allergies as of 02/11/2020   • (No Known Allergies)     Social History     Socioeconomic History   • Marital status:      Spouse name: Not on file   • Number of children:  Not on file   • Years of education: Not on file   • Highest education level: Not on file   Tobacco Use   • Smoking status: Former Smoker     Types: Cigarettes     Last attempt to quit:      Years since quittin.1   • Smokeless tobacco: Never Used   • Tobacco comment: Caffeine use -6 drinks per day   Substance and Sexual Activity   • Alcohol use: Yes     Comment: social   • Drug use: No   • Sexual activity: Defer     Family History   Problem Relation Age of Onset   • Heart disease Mother    • Hypertension Mother    • Heart disease Father    • Hypertension Father    • Diabetes Sister    • Hypertension Sister    • Diabetes Brother    • Heart disease Brother    • Hypertension Brother    • Heart disease Brother    • Hypertension Brother    • Diabetes Brother    • Hypertension Brother    • Malig Hyperthermia Neg Hx      Review of Systems   Constitution: Negative for chills, fever and malaise/fatigue.   HENT: Negative for ear pain, hearing loss, nosebleeds and sore throat.    Eyes: Negative for double vision, pain, vision loss in left eye and vision loss in right eye.   Cardiovascular: Positive for leg swelling. Negative for chest pain, claudication, dyspnea on exertion, irregular heartbeat, near-syncope, orthopnea, palpitations, paroxysmal nocturnal dyspnea and syncope.   Respiratory: Negative for cough, shortness of breath, snoring and wheezing.    Endocrine: Negative for cold intolerance and heat intolerance.   Hematologic/Lymphatic: Negative for bleeding problem.   Skin: Negative for color change, itching, rash and unusual hair distribution.   Musculoskeletal: Negative for joint pain and joint swelling.   Gastrointestinal: Negative for abdominal pain, diarrhea, hematochezia, melena, nausea and vomiting.   Genitourinary: Positive for bladder incontinence (urinary leakage). Negative for decreased libido, frequency, hematuria, hesitancy and incomplete emptying.   Neurological: Negative for excessive daytime  "sleepiness, dizziness, headaches, light-headedness, loss of balance, numbness, paresthesias and seizures.   Psychiatric/Behavioral: Negative for depression.          Objective:     Vitals:    02/11/20 1300 02/11/20 1333   BP: 112/78    BP Location: Left arm    Patient Position: Sitting    Cuff Size: Adult    Pulse: 72 71   SpO2:  98%   Weight: (!) 137 kg (301 lb 9.6 oz)    Height: 177.8 cm (70\")      Body mass index is 43.28 kg/m².    PHYSICAL EXAM:  Physical Exam   Constitutional: He is oriented to person, place, and time. He appears well-developed and well-nourished. No distress.   Morbidly obese   HENT:   Head: Normocephalic and atraumatic.   Eyes: Pupils are equal, round, and reactive to light. Conjunctivae and EOM are normal.   Wearing glasses   Neck: No JVD present. Carotid bruit is not present.   Cardiovascular: Normal rate, regular rhythm, normal heart sounds and intact distal pulses.   No murmur heard.  Pulses:       Radial pulses are 2+ on the right side, and 2+ on the left side.        Posterior tibial pulses are 2+ on the right side, and 2+ on the left side.   Nonpitting dependent bilateral lower extremity edema   Pulmonary/Chest: Effort normal and breath sounds normal. No accessory muscle usage. No tachypnea. No respiratory distress. He has no decreased breath sounds. He has no wheezes. He has no rhonchi. He has no rales. He exhibits no tenderness.   Abdominal: Soft. Bowel sounds are normal. He exhibits no distension. There is no tenderness. There is no rebound and no guarding.   Neurological: He is alert and oriented to person, place, and time.   Skin: Skin is warm, dry and intact. He is not diaphoretic. No erythema.   Psychiatric: He has a normal mood and affect. His speech is normal and behavior is normal. Judgment and thought content normal. Cognition and memory are normal.   Nursing note and vitals reviewed.        ECG 12 Lead  Date/Time: 2/11/2020 1:07 PM  Performed by: Beverley Crawley " RENETTA  Authorized by: Beverley Crawley APRN   Comparison: compared with previous ECG from 2/5/2020  Comparison to previous ECG: HR faster  Rhythm: sinus rhythm  Rate: normal  BPM: 72  Conduction: 1st degree AV block  QRS axis: left  Other findings: non-specific ST-T wave changes and T wave abnormality    Clinical impression: abnormal EKG  Comments: Diffuse nonspecific t-wave flattening  Indication: Hx of CAD, preoperative evaluation            Assessment:       Diagnosis Plan   1. Pre-operative cardiovascular examination  ECG 12 Lead   2. Coronary artery disease involving native coronary artery of native heart without angina pectoris  ECG 12 Lead   3. Hyperlipidemia, unspecified hyperlipidemia type  ECG 12 Lead   4. Essential hypertension  ECG 12 Lead   5. Morbid obesity with BMI of 40.0-44.9, adult (CMS/[facility])  ECG 12 Lead       Plan:     1. Preoperative evaluation: Scheduled for artificial urinary sphincter with Dr. Rosado tomorrow. He had a cath a little over a year ago.  2. Coronary artery disease: Nonobstructive disease on cardiac catheterization December 2018  3. Hypertension: On multiple medications.  Blood pressure is under reasonable control.  He brings in a log of blood pressures from the last rate blood pressures ranging 108-145/67-87 with most systolic readings in the 110 to 130 range  4. Morbid obesity: BMI 43.3.  Would benefit from regular physical activity and weight loss.  5.  Hyperlipidemia: On statin therapy managed by outside provider.  I have requested a copy of patient's most recent lipid panel.  6.  Bradycardia: On last 2 ECGs his heart rate was below 60.  Today his heart rate is in the 70s.  He brings in a log of his heart rates last week ranging from 50s to 70s.  7.  Dependent edema: Stable.  His nifedipine also may be contributing.  His symptoms improved with elevation and compression stockings.    He is stable from a cardiac standpoint.  He is felt to be at acceptable risk to proceed with low  risk urinary surgery as planned tomorrow.  We did discuss risk factor modification and I advised on the importance of good blood pressure, blood sugar and cholesterol control, as well as regular exercise and weight loss and avoidance of tobacco for cardiovascular disease risk factor modification.         Follow up with Dr. Tinsley in 1 year, unless otherwise needed sooner.  I advised the patient to contact our office with any questions or concerns.      It has been a pleasure to participate in this patient's care. Please feel free to contact me with any questions or concerns.     RENETTA England  02/11/20             Your medication list           Accurate as of February 11, 2020  1:42 PM. If you have any questions, ask your nurse or doctor.               CONTINUE taking these medications      Instructions Last Dose Given Next Dose Due   ASPIR-LOW 81 MG EC tablet  Generic drug:  aspirin      Take 81 mg by mouth Every Night.       atenolol 50 MG tablet  Commonly known as:  TENORMIN      Take 50 mg by mouth Daily.       ferrous sulfate 325 (65 Fe) MG tablet      Take 325 mg by mouth.       lisinopril 40 MG tablet  Commonly known as:  PRINIVIL,ZESTRIL      Take 40 mg by mouth Daily.       NIFEdipine CC 60 MG 24 hr tablet  Commonly known as:  ADALAT CC      Take 60 mg by mouth Daily.       oxybutynin XL 15 MG 24 hr tablet  Commonly known as:  DITROPAN XL      Take 15 mg by mouth Daily.       pravastatin 40 MG tablet  Commonly known as:  PRAVACHOL      Take 40 mg by mouth Daily.       triamterene-hydrochlorothiazide 37.5-25 MG per tablet  Commonly known as:  MAXZIDE-25      Take 1 tablet by mouth Daily.       VITAMIN B-12 PO      Take 2,500 mcg by mouth Daily.          STOP taking these medications    IRON 27 PO  Stopped by:  RENETTA England               The above medication changes may not have been made by this provider.  Medication list was updated to reflect medications patient is currently taking  including medication changes and discontinuations made by other healthcare providers.     Dictated utilizing Dragon Dictation System.

## 2020-02-11 ENCOUNTER — OFFICE VISIT (OUTPATIENT)
Dept: CARDIOLOGY | Facility: CLINIC | Age: 75
End: 2020-02-11

## 2020-02-11 VITALS
OXYGEN SATURATION: 98 % | WEIGHT: 301.6 LBS | BODY MASS INDEX: 43.18 KG/M2 | SYSTOLIC BLOOD PRESSURE: 112 MMHG | DIASTOLIC BLOOD PRESSURE: 78 MMHG | HEIGHT: 70 IN | HEART RATE: 71 BPM

## 2020-02-11 DIAGNOSIS — I10 ESSENTIAL HYPERTENSION: ICD-10-CM

## 2020-02-11 DIAGNOSIS — I25.10 CORONARY ARTERY DISEASE INVOLVING NATIVE CORONARY ARTERY OF NATIVE HEART WITHOUT ANGINA PECTORIS: ICD-10-CM

## 2020-02-11 DIAGNOSIS — Z01.810 PRE-OPERATIVE CARDIOVASCULAR EXAMINATION: Primary | ICD-10-CM

## 2020-02-11 DIAGNOSIS — E66.01 MORBID OBESITY WITH BMI OF 40.0-44.9, ADULT (HCC): ICD-10-CM

## 2020-02-11 DIAGNOSIS — E78.5 HYPERLIPIDEMIA, UNSPECIFIED HYPERLIPIDEMIA TYPE: ICD-10-CM

## 2020-02-11 PROCEDURE — 93000 ELECTROCARDIOGRAM COMPLETE: CPT | Performed by: NURSE PRACTITIONER

## 2020-02-11 PROCEDURE — 99213 OFFICE O/P EST LOW 20 MIN: CPT | Performed by: NURSE PRACTITIONER

## 2020-02-11 RX ORDER — PNV NO.95/FERROUS FUM/FOLIC AC 28MG-0.8MG
325 TABLET ORAL
COMMUNITY
End: 2021-04-13

## 2020-02-12 ENCOUNTER — ANESTHESIA (OUTPATIENT)
Dept: PERIOP | Facility: HOSPITAL | Age: 75
End: 2020-02-12

## 2020-02-12 ENCOUNTER — HOSPITAL ENCOUNTER (OUTPATIENT)
Facility: HOSPITAL | Age: 75
Discharge: HOME | End: 2020-02-14
Attending: UROLOGY | Admitting: UROLOGY

## 2020-02-12 ENCOUNTER — ANESTHESIA EVENT (OUTPATIENT)
Dept: PERIOP | Facility: HOSPITAL | Age: 75
End: 2020-02-12

## 2020-02-12 DIAGNOSIS — N39.3 STRESS INCONTINENCE, MALE: Primary | ICD-10-CM

## 2020-02-12 DIAGNOSIS — I25.10 CORONARY ARTERY DISEASE INVOLVING NATIVE CORONARY ARTERY OF NATIVE HEART WITHOUT ANGINA PECTORIS: ICD-10-CM

## 2020-02-12 DIAGNOSIS — E78.5 HYPERLIPIDEMIA, UNSPECIFIED HYPERLIPIDEMIA TYPE: ICD-10-CM

## 2020-02-12 DIAGNOSIS — I10 ESSENTIAL HYPERTENSION: ICD-10-CM

## 2020-02-12 DIAGNOSIS — Z85.46 HISTORY OF MALIGNANT NEOPLASM OF PROSTATE: ICD-10-CM

## 2020-02-12 PROCEDURE — 63710000001 HYDROCODONE-ACETAMINOPHEN 7.5-325 MG TABLET: Performed by: NURSE ANESTHETIST, CERTIFIED REGISTERED

## 2020-02-12 PROCEDURE — 63710000001 TRIAMTERENE-HYDROCHLOROTHIAZIDE 37.5-25 MG TABLET: Performed by: UROLOGY

## 2020-02-12 PROCEDURE — A9270 NON-COVERED ITEM OR SERVICE: HCPCS | Performed by: UROLOGY

## 2020-02-12 PROCEDURE — 25010000002 GENTAMICIN PER 80 MG: Performed by: UROLOGY

## 2020-02-12 PROCEDURE — 63710000001 LISINOPRIL 20 MG TABLET: Performed by: UROLOGY

## 2020-02-12 PROCEDURE — G0378 HOSPITAL OBSERVATION PER HR: HCPCS

## 2020-02-12 PROCEDURE — C1815 PROS, URINARY SPH, IMP: HCPCS | Performed by: UROLOGY

## 2020-02-12 PROCEDURE — 25010000002 FENTANYL CITRATE (PF) 100 MCG/2ML SOLUTION: Performed by: ANESTHESIOLOGY

## 2020-02-12 PROCEDURE — 63710000001 SENNA 8.6 MG TABLET: Performed by: UROLOGY

## 2020-02-12 PROCEDURE — 63710000001 OXYCODONE-ACETAMINOPHEN 7.5-325 MG TABLET: Performed by: UROLOGY

## 2020-02-12 PROCEDURE — 25010000002 PROPOFOL 10 MG/ML EMULSION: Performed by: NURSE ANESTHETIST, CERTIFIED REGISTERED

## 2020-02-12 PROCEDURE — A9270 NON-COVERED ITEM OR SERVICE: HCPCS | Performed by: NURSE ANESTHETIST, CERTIFIED REGISTERED

## 2020-02-12 PROCEDURE — 63710000001 ATENOLOL 50 MG TABLET: Performed by: UROLOGY

## 2020-02-12 PROCEDURE — 63710000001 NIFEDIPINE XL 60 MG TABLET SUSTAINED-RELEASE 24 HOUR: Performed by: UROLOGY

## 2020-02-12 PROCEDURE — 25010000002 NEOSTIGMINE PER 0.5 MG: Performed by: NURSE ANESTHETIST, CERTIFIED REGISTERED

## 2020-02-12 PROCEDURE — 25010000002 VANCOMYCIN PER 500 MG: Performed by: UROLOGY

## 2020-02-12 DEVICE — CUFF OCCL URNRY AMS800SYS W/IZ 4CM: Type: IMPLANTABLE DEVICE | Status: FUNCTIONAL

## 2020-02-12 DEVICE — PUMP CNTRL URINE AMS800: Type: IMPLANTABLE DEVICE | Status: FUNCTIONAL

## 2020-02-12 RX ORDER — HYDRALAZINE HYDROCHLORIDE 20 MG/ML
5 INJECTION INTRAMUSCULAR; INTRAVENOUS
Status: DISCONTINUED | OUTPATIENT
Start: 2020-02-12 | End: 2020-02-12 | Stop reason: HOSPADM

## 2020-02-12 RX ORDER — VANCOMYCIN HYDROCHLORIDE 1 G/200ML
1 INJECTION, SOLUTION INTRAVENOUS ONCE
Status: COMPLETED | OUTPATIENT
Start: 2020-02-12 | End: 2020-02-12

## 2020-02-12 RX ORDER — PRAVASTATIN SODIUM 40 MG
40 TABLET ORAL DAILY
Status: DISCONTINUED | OUTPATIENT
Start: 2020-02-12 | End: 2020-02-14 | Stop reason: HOSPADM

## 2020-02-12 RX ORDER — ACETAMINOPHEN 325 MG/1
650 TABLET ORAL ONCE AS NEEDED
Status: DISCONTINUED | OUTPATIENT
Start: 2020-02-12 | End: 2020-02-12 | Stop reason: HOSPADM

## 2020-02-12 RX ORDER — EPHEDRINE SULFATE 50 MG/ML
5 INJECTION, SOLUTION INTRAVENOUS ONCE AS NEEDED
Status: DISCONTINUED | OUTPATIENT
Start: 2020-02-12 | End: 2020-02-12 | Stop reason: HOSPADM

## 2020-02-12 RX ORDER — PROMETHAZINE HYDROCHLORIDE 25 MG/1
25 SUPPOSITORY RECTAL ONCE AS NEEDED
Status: DISCONTINUED | OUTPATIENT
Start: 2020-02-12 | End: 2020-02-12 | Stop reason: HOSPADM

## 2020-02-12 RX ORDER — NALOXONE HCL 0.4 MG/ML
0.2 VIAL (ML) INJECTION AS NEEDED
Status: DISCONTINUED | OUTPATIENT
Start: 2020-02-12 | End: 2020-02-12 | Stop reason: HOSPADM

## 2020-02-12 RX ORDER — FAMOTIDINE 10 MG/ML
20 INJECTION, SOLUTION INTRAVENOUS ONCE
Status: COMPLETED | OUTPATIENT
Start: 2020-02-12 | End: 2020-02-12

## 2020-02-12 RX ORDER — HYDROCODONE BITARTRATE AND ACETAMINOPHEN 7.5; 325 MG/1; MG/1
1 TABLET ORAL ONCE AS NEEDED
Status: COMPLETED | OUTPATIENT
Start: 2020-02-12 | End: 2020-02-12

## 2020-02-12 RX ORDER — FENTANYL CITRATE 50 UG/ML
50 INJECTION, SOLUTION INTRAMUSCULAR; INTRAVENOUS
Status: DISCONTINUED | OUTPATIENT
Start: 2020-02-12 | End: 2020-02-12 | Stop reason: HOSPADM

## 2020-02-12 RX ORDER — DIPHENHYDRAMINE HCL 25 MG
25 CAPSULE ORAL
Status: DISCONTINUED | OUTPATIENT
Start: 2020-02-12 | End: 2020-02-12 | Stop reason: HOSPADM

## 2020-02-12 RX ORDER — ONDANSETRON 2 MG/ML
4 INJECTION INTRAMUSCULAR; INTRAVENOUS ONCE AS NEEDED
Status: DISCONTINUED | OUTPATIENT
Start: 2020-02-12 | End: 2020-02-12 | Stop reason: HOSPADM

## 2020-02-12 RX ORDER — GENTAMICIN SULFATE 60 MG/50ML
120 INJECTION, SOLUTION INTRAVENOUS ONCE
Status: COMPLETED | OUTPATIENT
Start: 2020-02-12 | End: 2020-02-13

## 2020-02-12 RX ORDER — ONDANSETRON 2 MG/ML
4 INJECTION INTRAMUSCULAR; INTRAVENOUS EVERY 6 HOURS PRN
Status: DISCONTINUED | OUTPATIENT
Start: 2020-02-12 | End: 2020-02-14 | Stop reason: HOSPADM

## 2020-02-12 RX ORDER — LIDOCAINE HYDROCHLORIDE 20 MG/ML
INJECTION, SOLUTION INFILTRATION; PERINEURAL AS NEEDED
Status: DISCONTINUED | OUTPATIENT
Start: 2020-02-12 | End: 2020-02-12 | Stop reason: SURG

## 2020-02-12 RX ORDER — MIDAZOLAM HYDROCHLORIDE 1 MG/ML
2 INJECTION INTRAMUSCULAR; INTRAVENOUS
Status: DISCONTINUED | OUTPATIENT
Start: 2020-02-12 | End: 2020-02-12 | Stop reason: HOSPADM

## 2020-02-12 RX ORDER — PROPOFOL 10 MG/ML
VIAL (ML) INTRAVENOUS AS NEEDED
Status: DISCONTINUED | OUTPATIENT
Start: 2020-02-12 | End: 2020-02-12 | Stop reason: SURG

## 2020-02-12 RX ORDER — DIPHENHYDRAMINE HYDROCHLORIDE 50 MG/ML
12.5 INJECTION INTRAMUSCULAR; INTRAVENOUS
Status: DISCONTINUED | OUTPATIENT
Start: 2020-02-12 | End: 2020-02-12 | Stop reason: HOSPADM

## 2020-02-12 RX ORDER — SODIUM CHLORIDE, SODIUM LACTATE, POTASSIUM CHLORIDE, CALCIUM CHLORIDE 600; 310; 30; 20 MG/100ML; MG/100ML; MG/100ML; MG/100ML
9 INJECTION, SOLUTION INTRAVENOUS CONTINUOUS
Status: DISCONTINUED | OUTPATIENT
Start: 2020-02-12 | End: 2020-02-12

## 2020-02-12 RX ORDER — SENNOSIDES 8.6 MG
2 TABLET ORAL NIGHTLY
Status: DISCONTINUED | OUTPATIENT
Start: 2020-02-12 | End: 2020-02-14 | Stop reason: HOSPADM

## 2020-02-12 RX ORDER — VANCOMYCIN HYDROCHLORIDE 1 G/200ML
1000 INJECTION, SOLUTION INTRAVENOUS ONCE
Status: COMPLETED | OUTPATIENT
Start: 2020-02-13 | End: 2020-02-13

## 2020-02-12 RX ORDER — OXYCODONE AND ACETAMINOPHEN 7.5; 325 MG/1; MG/1
2 TABLET ORAL EVERY 4 HOURS PRN
Status: DISCONTINUED | OUTPATIENT
Start: 2020-02-12 | End: 2020-02-14 | Stop reason: HOSPADM

## 2020-02-12 RX ORDER — SODIUM CHLORIDE 0.9 % (FLUSH) 0.9 %
3-10 SYRINGE (ML) INJECTION AS NEEDED
Status: DISCONTINUED | OUTPATIENT
Start: 2020-02-12 | End: 2020-02-12 | Stop reason: HOSPADM

## 2020-02-12 RX ORDER — GENTAMICIN SULFATE 60 MG/50ML
120 INJECTION, SOLUTION INTRAVENOUS EVERY 8 HOURS
Status: DISCONTINUED | OUTPATIENT
Start: 2020-02-12 | End: 2020-02-12

## 2020-02-12 RX ORDER — PROMETHAZINE HYDROCHLORIDE 25 MG/ML
6.25 INJECTION, SOLUTION INTRAMUSCULAR; INTRAVENOUS
Status: DISCONTINUED | OUTPATIENT
Start: 2020-02-12 | End: 2020-02-12 | Stop reason: HOSPADM

## 2020-02-12 RX ORDER — HYDROMORPHONE HYDROCHLORIDE 1 MG/ML
0.5 INJECTION, SOLUTION INTRAMUSCULAR; INTRAVENOUS; SUBCUTANEOUS
Status: DISCONTINUED | OUTPATIENT
Start: 2020-02-12 | End: 2020-02-14 | Stop reason: HOSPADM

## 2020-02-12 RX ORDER — LISINOPRIL 20 MG/1
40 TABLET ORAL DAILY
Status: DISCONTINUED | OUTPATIENT
Start: 2020-02-12 | End: 2020-02-13

## 2020-02-12 RX ORDER — PROMETHAZINE HYDROCHLORIDE 25 MG/ML
12.5 INJECTION, SOLUTION INTRAMUSCULAR; INTRAVENOUS ONCE AS NEEDED
Status: DISCONTINUED | OUTPATIENT
Start: 2020-02-12 | End: 2020-02-12 | Stop reason: HOSPADM

## 2020-02-12 RX ORDER — SODIUM CHLORIDE 9 MG/ML
INJECTION, SOLUTION INTRAVENOUS AS NEEDED
Status: DISCONTINUED | OUTPATIENT
Start: 2020-02-12 | End: 2020-02-12 | Stop reason: HOSPADM

## 2020-02-12 RX ORDER — SODIUM CHLORIDE 0.9 % (FLUSH) 0.9 %
3 SYRINGE (ML) INJECTION EVERY 12 HOURS SCHEDULED
Status: DISCONTINUED | OUTPATIENT
Start: 2020-02-12 | End: 2020-02-12 | Stop reason: HOSPADM

## 2020-02-12 RX ORDER — MIDAZOLAM HYDROCHLORIDE 1 MG/ML
1 INJECTION INTRAMUSCULAR; INTRAVENOUS
Status: DISCONTINUED | OUTPATIENT
Start: 2020-02-12 | End: 2020-02-12 | Stop reason: HOSPADM

## 2020-02-12 RX ORDER — SODIUM CHLORIDE 9 MG/ML
75 INJECTION, SOLUTION INTRAVENOUS CONTINUOUS
Status: DISCONTINUED | OUTPATIENT
Start: 2020-02-12 | End: 2020-02-14 | Stop reason: HOSPADM

## 2020-02-12 RX ORDER — NALOXONE HCL 0.4 MG/ML
0.1 VIAL (ML) INJECTION
Status: DISCONTINUED | OUTPATIENT
Start: 2020-02-12 | End: 2020-02-14 | Stop reason: HOSPADM

## 2020-02-12 RX ORDER — FLUMAZENIL 0.1 MG/ML
0.2 INJECTION INTRAVENOUS AS NEEDED
Status: DISCONTINUED | OUTPATIENT
Start: 2020-02-12 | End: 2020-02-12 | Stop reason: HOSPADM

## 2020-02-12 RX ORDER — MEPERIDINE HYDROCHLORIDE 25 MG/ML
12.5 INJECTION INTRAMUSCULAR; INTRAVENOUS; SUBCUTANEOUS
Status: DISCONTINUED | OUTPATIENT
Start: 2020-02-12 | End: 2020-02-12 | Stop reason: HOSPADM

## 2020-02-12 RX ORDER — HYDROMORPHONE HYDROCHLORIDE 1 MG/ML
0.5 INJECTION, SOLUTION INTRAMUSCULAR; INTRAVENOUS; SUBCUTANEOUS
Status: DISCONTINUED | OUTPATIENT
Start: 2020-02-12 | End: 2020-02-12 | Stop reason: HOSPADM

## 2020-02-12 RX ORDER — FLUCONAZOLE 2 MG/ML
400 INJECTION, SOLUTION INTRAVENOUS DAILY
Status: COMPLETED | OUTPATIENT
Start: 2020-02-13 | End: 2020-02-13

## 2020-02-12 RX ORDER — GLYCOPYRROLATE 0.2 MG/ML
INJECTION INTRAMUSCULAR; INTRAVENOUS AS NEEDED
Status: DISCONTINUED | OUTPATIENT
Start: 2020-02-12 | End: 2020-02-12 | Stop reason: SURG

## 2020-02-12 RX ORDER — ROCURONIUM BROMIDE 10 MG/ML
INJECTION, SOLUTION INTRAVENOUS AS NEEDED
Status: DISCONTINUED | OUTPATIENT
Start: 2020-02-12 | End: 2020-02-12 | Stop reason: SURG

## 2020-02-12 RX ORDER — ONDANSETRON 4 MG/1
4 TABLET, FILM COATED ORAL EVERY 6 HOURS PRN
Status: DISCONTINUED | OUTPATIENT
Start: 2020-02-12 | End: 2020-02-14 | Stop reason: HOSPADM

## 2020-02-12 RX ORDER — NIFEDIPINE 60 MG/1
60 TABLET, EXTENDED RELEASE ORAL DAILY
Status: DISCONTINUED | OUTPATIENT
Start: 2020-02-12 | End: 2020-02-13

## 2020-02-12 RX ORDER — OXYCODONE AND ACETAMINOPHEN 7.5; 325 MG/1; MG/1
1 TABLET ORAL ONCE AS NEEDED
Status: DISCONTINUED | OUTPATIENT
Start: 2020-02-12 | End: 2020-02-12 | Stop reason: HOSPADM

## 2020-02-12 RX ORDER — LIDOCAINE HYDROCHLORIDE 10 MG/ML
0.5 INJECTION, SOLUTION EPIDURAL; INFILTRATION; INTRACAUDAL; PERINEURAL ONCE AS NEEDED
Status: DISCONTINUED | OUTPATIENT
Start: 2020-02-12 | End: 2020-02-12 | Stop reason: HOSPADM

## 2020-02-12 RX ORDER — PROMETHAZINE HYDROCHLORIDE 25 MG/1
25 TABLET ORAL ONCE AS NEEDED
Status: DISCONTINUED | OUTPATIENT
Start: 2020-02-12 | End: 2020-02-12 | Stop reason: HOSPADM

## 2020-02-12 RX ORDER — TRIAMTERENE AND HYDROCHLOROTHIAZIDE 37.5; 25 MG/1; MG/1
1 TABLET ORAL DAILY
Status: DISCONTINUED | OUTPATIENT
Start: 2020-02-12 | End: 2020-02-13

## 2020-02-12 RX ORDER — ATENOLOL 50 MG/1
50 TABLET ORAL DAILY
Status: DISCONTINUED | OUTPATIENT
Start: 2020-02-12 | End: 2020-02-13

## 2020-02-12 RX ADMIN — SODIUM CHLORIDE 75 ML/HR: 9 INJECTION, SOLUTION INTRAVENOUS at 18:14

## 2020-02-12 RX ADMIN — ROCURONIUM BROMIDE 35 MG: 10 INJECTION, SOLUTION INTRAVENOUS at 13:50

## 2020-02-12 RX ADMIN — FAMOTIDINE 20 MG: 10 INJECTION INTRAVENOUS at 12:15

## 2020-02-12 RX ADMIN — VANCOMYCIN HYDROCHLORIDE 1 G: 1 INJECTION, SOLUTION INTRAVENOUS at 11:59

## 2020-02-12 RX ADMIN — GENTAMICIN SULFATE 120 MG: 60 INJECTION, SOLUTION INTRAVENOUS at 13:25

## 2020-02-12 RX ADMIN — TRIAMTERENE AND HYDROCHLOROTHIAZIDE 1 TABLET: 37.5; 25 TABLET ORAL at 18:49

## 2020-02-12 RX ADMIN — PROPOFOL 200 MG: 10 INJECTION, EMULSION INTRAVENOUS at 13:50

## 2020-02-12 RX ADMIN — NEOSTIGMINE METHYLSULFATE 3 MG: 1 INJECTION INTRAMUSCULAR; INTRAVENOUS; SUBCUTANEOUS at 14:52

## 2020-02-12 RX ADMIN — NIFEDIPINE 60 MG: 60 TABLET, FILM COATED, EXTENDED RELEASE ORAL at 18:49

## 2020-02-12 RX ADMIN — OXYCODONE HYDROCHLORIDE AND ACETAMINOPHEN 1 TABLET: 7.5; 325 TABLET ORAL at 20:31

## 2020-02-12 RX ADMIN — FENTANYL CITRATE 50 MCG: 50 INJECTION INTRAMUSCULAR; INTRAVENOUS at 13:50

## 2020-02-12 RX ADMIN — HYDROCODONE BITARTRATE AND ACETAMINOPHEN 1 TABLET: 7.5; 325 TABLET ORAL at 15:59

## 2020-02-12 RX ADMIN — SODIUM CHLORIDE, POTASSIUM CHLORIDE, SODIUM LACTATE AND CALCIUM CHLORIDE 9 ML/HR: 600; 310; 30; 20 INJECTION, SOLUTION INTRAVENOUS at 12:15

## 2020-02-12 RX ADMIN — ATENOLOL 50 MG: 50 TABLET ORAL at 18:48

## 2020-02-12 RX ADMIN — GLYCOPYRROLATE 0.4 MG: 0.2 INJECTION INTRAMUSCULAR; INTRAVENOUS at 14:52

## 2020-02-12 RX ADMIN — SENNOSIDES 17.2 MG: 8.6 TABLET, FILM COATED ORAL at 20:33

## 2020-02-12 RX ADMIN — LISINOPRIL 40 MG: 20 TABLET ORAL at 18:48

## 2020-02-12 RX ADMIN — LIDOCAINE HYDROCHLORIDE 60 MG: 20 INJECTION, SOLUTION INFILTRATION; PERINEURAL at 13:50

## 2020-02-12 RX ADMIN — GENTAMICIN SULFATE 490 MG: 40 INJECTION, SOLUTION INTRAMUSCULAR; INTRAVENOUS at 19:26

## 2020-02-12 RX ADMIN — FENTANYL CITRATE 50 MCG: 50 INJECTION INTRAMUSCULAR; INTRAVENOUS at 15:00

## 2020-02-12 NOTE — ANESTHESIA POSTPROCEDURE EVALUATION
"Patient: Jake Alberts    Procedure Summary     Date:  02/12/20 Room / Location:  [facility] OR [address] [place] [address] MAIN OR    Anesthesia Start:  1343 Anesthesia Stop:  1511    Procedure:  INSERTION OF ARTIFICIAL URINARY SPHINCTER (N/A ) Diagnosis:       Stress incontinence, male      (Stress Incontinence)    Surgeon:  Paxton Rosado MD Provider:  Paxton Astorga MD    Anesthesia Type:  general ASA Status:  3          Anesthesia Type: general    Vitals  Vitals Value Taken Time   /77 2/12/2020  4:15 PM   Temp 36.9 °C (98.4 °F) 2/12/2020  4:15 PM   Pulse 63 2/12/2020  4:29 PM   Resp 16 2/12/2020  4:00 PM   SpO2 92 % 2/12/2020  4:29 PM   Vitals shown include unvalidated device data.        Post Anesthesia Care and Evaluation    Patient location during evaluation: bedside  Patient participation: complete - patient participated  Level of consciousness: sleepy but conscious  Pain score: 0  Pain management: adequate  Airway patency: patent  Anesthetic complications: No anesthetic complications    Cardiovascular status: acceptable  Respiratory status: acceptable  Hydration status: acceptable    Comments: /77   Pulse 60   Temp 36.9 °C (98.4 °F)   Resp 16   Ht 177.8 cm (70\")   Wt (!) 136 kg (300 lb 2 oz)   SpO2 93%   BMI 43.06 kg/m²         "

## 2020-02-12 NOTE — ANESTHESIA PROCEDURE NOTES
Airway  Urgency: elective    Date/Time: 2/12/2020 1:52 PM  Airway not difficult    General Information and Staff    Patient location during procedure: OR  CRNA: Emma Garcia CRNA    Indications and Patient Condition  Indications for airway management: airway protection    Preoxygenated: yes  Mask difficulty assessment: 1 - vent by mask    Final Airway Details  Final airway type: endotracheal airway      Successful airway: ETT  Cuffed: yes   Successful intubation technique: direct laryngoscopy  Endotracheal tube insertion site: oral  Blade: John  Blade size: 4  ETT size (mm): 7.5  Cormack-Lehane Classification: grade I - full view of glottis  Placement verified by: chest auscultation and capnometry   Cuff volume (mL): 8  Measured from: lips  ETT/EBT  to lips (cm): 21  Number of attempts at approach: 1  Assessment: lips, teeth, and gum same as pre-op and atraumatic intubation    Additional Comments  Smooth IV induction. Trachea intubated. Cuff up. Dentition intact without injury.

## 2020-02-12 NOTE — H&P
[facility] and Physical    Patient Care Team:  Yun Ruff MD as PCP - General (Internal Medicine)  Joshua Tinsley MD as Consulting Physician (Cardiology)    Chief complaint incontinence    Subjective     Patient is a 74 y.o. male presents with stress urine incontinence.  He has a history of prostate cancer with previous prostatectomy.  Is going through several pads per day.  Still battling some morbid obesity.  He is tried conservative management.  He now presents for artificial urinary sphincter..     Review of Systems   Pertinent items are noted in HPI, all other systems reviewed and negative    Past Medical History:   Diagnosis Date   • Anemia    • CAD (coronary artery disease)    • Chest pain    • Diastolic dysfunction     Stage I   • Dizziness and giddiness    • Dyspnea    • Hyperlipidemia    • Hypertension    • Left ventricular hypertrophy     Mild concentric   • Mitral regurgitation     Trace   • Morbid obesity with BMI of 40.0-44.9, adult (CMS/[facility])    • RAFAEL on CPAP     CPAP OFF/ON   • Pes anserine bursitis    • Prostate cancer (CMS/[facility])    • Rotator cuff tendinitis    • Sinusitis    • TIA (transient ischemic attack) 2009   • Tricuspid regurgitation      Past Surgical History:   Procedure Laterality Date   • CARDIAC CATHETERIZATION N/A 12/18/2018    Procedure: Left Heart Cath;  Surgeon: Paxton Grant MD;  Location: [facility] CATH INVASIVE LOCATION;  Service: Cardiovascular   • CARDIAC CATHETERIZATION N/A 12/18/2018    Procedure: Coronary angiography;  Surgeon: Paxton Grant MD;  Location: [facility] CATH INVASIVE LOCATION;  Service: Cardiovascular   • CARDIAC CATHETERIZATION N/A 12/18/2018    Procedure: Left ventriculography;  Surgeon: Paxton Grant MD;  Location: [facility] INVASIVE LOCATION;  Service: Cardiovascular   • COLONOSCOPY N/A 12/5/2016    Procedure: COLONOSCOPY with polypectomy (cold biopsy);  Surgeon: Alex Gallego MD;  Location:   [facility];  Service:    • EYE SURGERY      CATARACTS   • KNEE SURGERY Left    • PROSTATE SURGERY  2014   • SHOULDER SURGERY Left 2013     Family History   Problem Relation Age of Onset   • Heart disease Mother    • Hypertension Mother    • Heart disease Father    • Hypertension Father    • Diabetes Sister    • Hypertension Sister    • Diabetes Brother    • Heart disease Brother    • Hypertension Brother    • Heart disease Brother    • Hypertension Brother    • Diabetes Brother    • Hypertension Brother    • Malig Hyperthermia Neg Hx      Social History     Tobacco Use   • Smoking status: Former Smoker     Types: Cigarettes     Last attempt to quit:      Years since quittin.1   • Smokeless tobacco: Never Used   • Tobacco comment: Caffeine use -6 drinks per day   Substance Use Topics   • Alcohol use: Yes     Comment: social   • Drug use: No       Meds:  Medications Prior to Admission   Medication Sig Dispense Refill Last Dose   • atenolol (TENORMIN) 50 MG tablet Take 50 mg by mouth Daily.   2020 at 1800   • ferrous sulfate 325 (65 Fe) MG tablet Take 325 mg by mouth.   2020 at Unknown time   • lisinopril (PRINIVIL,ZESTRIL) 40 MG tablet Take 40 mg by mouth Daily.   2020 at Unknown time   • NIFEdipine CC (ADALAT CC) 60 MG 24 hr tablet Take 60 mg by mouth Daily.   2020 at Unknown time   • oxybutynin XL (DITROPAN XL) 15 MG 24 hr tablet Take 15 mg by mouth Daily.   2020 at Unknown time   • pravastatin (PRAVACHOL) 40 MG tablet Take 40 mg by mouth Daily.   2020 at Unknown time   • triamterene-hydrochlorothiazide (MAXZIDE-25) 37.5-25 MG per tablet Take 1 tablet by mouth Daily.   2020 at Unknown time   • ASPIR-LOW 81 MG EC tablet Take 81 mg by mouth Every Night.   2020   • Cyanocobalamin (VITAMIN B-12 PO) Take 2,500 mcg by mouth Daily.   2020       Allergies:  Patient has no known allergies.    Debilities:  None    Objective     Vital Signs  Temp:  [97.4 °F (36.3 °C)]  "97.4 °F (36.3 °C)  Heart Rate:  [61-71] 61  Resp:  [18] 18  BP: (165)/(90) 165/90  No intake or output data in the 24 hours ending 02/12/20 1321  Flowsheet Rows      First Filed Value   Admission Height  177.8 cm (70\") Documented at 02/12/2020 1139   Admission Weight  (!) 136 kg (300 lb 2 oz) Documented at 02/12/2020 1139           Physical Exam:     General Appearance:    Alert, cooperative, NAD   HEENT:    No trauma, pupils reactive, hearing intact   Back:     No CVA tenderness   Lungs:     Respirations unlabored, no wheezing    Heart:    RRR, intact peripheral pulses   Abdomen:     Soft, NDNT, no masses, no guarding   :   Penis normal testes normal scrotum normal   Extremities:   No edema, no deformity   Lymphatic:   No neck or groin LAD   Skin:   No bleeding, bruising or rashes   Neuro/Psych:   Orientation intact, mood/affect pleasant, no focal findings     Results Review:    I reviewed the patient's new clinical results.  Results for orders placed or performed in visit on 02/05/20   Basic Metabolic Panel   Result Value Ref Range    Glucose 135 (H) 65 - 99 mg/dL    BUN 23 8 - 23 mg/dL    Creatinine 1.05 0.76 - 1.27 mg/dL    Sodium 141 136 - 145 mmol/L    Potassium 3.8 3.5 - 5.2 mmol/L    Chloride 102 98 - 107 mmol/L    CO2 25.2 22.0 - 29.0 mmol/L    Calcium 9.2 8.6 - 10.5 mg/dL    eGFR Non African Amer 69 >60 mL/min/1.73    BUN/Creatinine Ratio 21.9 7.0 - 25.0    Anion Gap 13.8 5.0 - 15.0 mmol/L   CBC (No Diff)   Result Value Ref Range    WBC 9.56 3.40 - 10.80 10*3/mm3    RBC 4.82 4.14 - 5.80 10*6/mm3    Hemoglobin 13.6 13.0 - 17.7 g/dL    Hematocrit 40.9 37.5 - 51.0 %    MCV 84.9 79.0 - 97.0 fL    MCH 28.2 26.6 - 33.0 pg    MCHC 33.3 31.5 - 35.7 g/dL    RDW 13.9 12.3 - 15.4 %    RDW-SD 42.6 37.0 - 54.0 fl    MPV 10.9 6.0 - 12.0 fL    Platelets 209 140 - 450 10*3/mm3        Assessment:  Urinary incontinence    Plan:    Implantation of artificial urinary sphincter    I discussed the patients findings and my " recommendations with patient.   Risks, complications, outcomes and alternatives discussed with the patient at the bedside and office.    Paxton Rosado MD  02/12/20  1:21 PM

## 2020-02-12 NOTE — PROGRESS NOTES
"Pharmacokinetic Consult - Aminoglycoside Dosing (Initial Note)    Jake Alberts has been automatically consulted for gentamicin dosing for surgical prophylaxis per [facility] policy.  Pharmacy dosing per Dr. Rosado's request.  Dosing method: extended-interval (nomogram dosing)    Relevant clinical data and objective history reviewed:  74 y.o. male 177.8 cm (70\")   (!) 136 kg (300 lb 2 oz)   Ideal body weight: 73 kg (160 lb 15 oz)  Adjusted ideal body weight: 98.3 kg (216 lb 9.8 oz)      Estimated Creatinine Clearance: 88.3 mL/min (by C-G formula based on SCr of 1.05 mg/dL).  No intake/output data recorded.  Lab Results   Component Value Date    WBC 9.56 02/05/2020     Temp Readings from Last 3 Encounters:   02/12/20 98.4 °F (36.9 °C)   02/05/20 96.8 °F (36 °C) (Oral)   12/18/18 99.2 °F (37.3 °C) (Temporal)     Baseline culture/source/susceptibility: no cultures pending, gentamycin for surgical prophylaxis after procedure for urinary sphincter.    Assessment/Plan  Will start gentamicin 490 mg IV every 24 hours (5 mg/kg based on dosing weight 98.3 kg). Will monitor serum creatinine every 24 hours for the first 3 days then at least every 48 hours per dosing recommendations. Will obtain serum concentration 10 hours after infusion, scheduled for 0530 on 2/13. Pharmacy will continue to follow daily while on an aminoglycoside and adjust as needed.     Jennifer Ramos [facility]    "

## 2020-02-12 NOTE — PLAN OF CARE
Problem: Patient Care Overview  Goal: Plan of Care Review  Outcome: Ongoing (interventions implemented as appropriate)  Flowsheets (Taken 2/12/2020 9122)  Plan of Care Reviewed With: patient  Outcome Summary: pt admited from urology. had artificial sphincter placed today. gaston catheter in place by urology. urine yellow, clear. VSS. Family at bedside. pt denies pain or nausea. awaiting on medications to be supplied by pharmacy. IVF started. Will continue to monitor

## 2020-02-12 NOTE — OP NOTE
URINARY SPHINCTER PROSTHESIS  Procedure Note    Jake Alberts  2/12/2020    Pre-op Diagnosis:   Stress Incontinence    Post-op Diagnosis:     Post-Op Diagnosis Codes:     * Stress incontinence, male [N39.3]    Procedure(s):  INSERTION OF ARTIFICIAL URINARY SPHINCTER    Surgeon(s):  Paxton Rosado MD    Anesthesia: General    Staff:   Circulator: Patricia Flowers RN; Jaime Morris RN  Scrub Person: Alyssa Kevin  Orientee: Sarah Trejo RN    Estimated Blood Loss: 25 mL    Specimens:                * No orders in the log *      Drains:   Urethral Catheter Latex 14 Fr. (Active)   Daily Indications Required activity restriction from trauma, surgery, (e.g. unstable spine, fracture, hemodynamics) 2/12/2020  3:08 PM   Site Assessment Clean;Skin intact 2/12/2020  3:08 PM   Collection Container Standard drainage bag 2/12/2020  3:08 PM   Securement Method Securing device 2/12/2020  3:08 PM       Findings: 3.5 to 4 cm bulbar urethra measurement with 4 cm cuff placement    Complications: None apparent    Indications: 74-year-old gentleman with stress incontinence for artificial urinary sphincter.    Procedure: Patient was taken up to given general anesthesia.  Placed in lithotomy using yellowfin's.  Prepped and draped in sterile fashion.  Yen catheter was placed with a 14 [place] catheter.  A transcortical incision was made.  [place] retractor was utilized.  I dissected down to the urethra.  I used right hemiscrotal approach and made a small pocket of the right hemiscrotum just down to the midline for control pump and is able dissect along the cord entering the external ring and into the retroperitoneal space where I placed our balloon reservoir with 25 cc of fluid.  It wanted to pull down and I did tack it down with a single 4-0 chromic suture to prevent some migration but I thought about making a secondary incision but given his obesity I was just concerned it would get infected.  I irrigated all  out with bacitracin solution.  I then mobilized the bulbar urethra and came down posteriorly behind it until I encircled the urethra without violation of it and measured out to 3.5 to 4 cm.  I like to use a 4 cm cuff.  We encircle it so that the tubing came out on the right side we made all of her connections and cycled the pump.  I then deactivated it.  Irrigated the wound with bacitracin solution and then closed it in 3 layers with 3-0 Vicryl and 4-0 Monocryl with skin glue.  Taken recovery in stable condition.      Paxton Rosado MD     Date: 2/12/2020  Time: 3:22 PM

## 2020-02-12 NOTE — ANESTHESIA PREPROCEDURE EVALUATION
Anesthesia Evaluation     Patient summary reviewed and Nursing notes reviewed                Airway   Mallampati: II  TM distance: >3 FB  Neck ROM: full  No difficulty expected  Dental    (+) edentulous    Pulmonary - normal exam   (+) a smoker Former, shortness of breath, sleep apnea on CPAP,   Cardiovascular     Rhythm: regular  Rate: normal    (+) hypertension 2 medications or greater, valvular problems/murmurs MR and TI, CAD, hyperlipidemia,     ROS comment: Non-obstructive CAD by cath 12/18, EF 55%, mild MR and TR, cleared for surgery by Dr. Tinsley  PE comment: No murmurs heard    Neuro/Psych  (+) TIA, dizziness/light headedness,     GI/Hepatic/Renal/Endo    (+) obesity, morbid obesity,      Musculoskeletal     Abdominal   (+) obese,    Substance History      OB/GYN          Other   arthritis,    history of cancer      Other Comment: Hx prostate CA                Anesthesia Plan    ASA 3     general     intravenous induction     Anesthetic plan, all risks, benefits, and alternatives have been provided, discussed and informed consent has been obtained with: patient.

## 2020-02-13 LAB
ANION GAP SERPL CALCULATED.3IONS-SCNC: 18.6 MMOL/L (ref 5–15)
BASOPHILS # BLD AUTO: 0.03 10*3/MM3 (ref 0–0.2)
BASOPHILS NFR BLD AUTO: 0.2 % (ref 0–1.5)
BUN BLD-MCNC: 27 MG/DL (ref 8–23)
BUN/CREAT SERPL: 20.8 (ref 7–25)
CALCIUM SPEC-SCNC: 8.4 MG/DL (ref 8.6–10.5)
CHLORIDE SERPL-SCNC: 102 MMOL/L (ref 98–107)
CO2 SERPL-SCNC: 19.4 MMOL/L (ref 22–29)
CREAT BLD-MCNC: 1.3 MG/DL (ref 0.76–1.27)
DEPRECATED RDW RBC AUTO: 41.5 FL (ref 37–54)
EOSINOPHIL # BLD AUTO: 0 10*3/MM3 (ref 0–0.4)
EOSINOPHIL NFR BLD AUTO: 0 % (ref 0.3–6.2)
ERYTHROCYTE [DISTWIDTH] IN BLOOD BY AUTOMATED COUNT: 13.7 % (ref 12.3–15.4)
GENTAMICIN SERPL-MCNC: 7.5 MCG/ML (ref 0.5–10)
GFR SERPL CREATININE-BSD FRML MDRD: 54 ML/MIN/1.73
GLUCOSE BLD-MCNC: 133 MG/DL (ref 65–99)
HCT VFR BLD AUTO: 39.3 % (ref 37.5–51)
HGB BLD-MCNC: 13 G/DL (ref 13–17.7)
IMM GRANULOCYTES # BLD AUTO: 0.12 10*3/MM3 (ref 0–0.05)
IMM GRANULOCYTES NFR BLD AUTO: 0.7 % (ref 0–0.5)
LYMPHOCYTES # BLD AUTO: 1.36 10*3/MM3 (ref 0.7–3.1)
LYMPHOCYTES NFR BLD AUTO: 7.8 % (ref 19.6–45.3)
MCH RBC QN AUTO: 27.8 PG (ref 26.6–33)
MCHC RBC AUTO-ENTMCNC: 33.1 G/DL (ref 31.5–35.7)
MCV RBC AUTO: 84 FL (ref 79–97)
MONOCYTES # BLD AUTO: 1.3 10*3/MM3 (ref 0.1–0.9)
MONOCYTES NFR BLD AUTO: 7.5 % (ref 5–12)
NEUTROPHILS # BLD AUTO: 14.57 10*3/MM3 (ref 1.7–7)
NEUTROPHILS NFR BLD AUTO: 83.8 % (ref 42.7–76)
NRBC BLD AUTO-RTO: 0 /100 WBC (ref 0–0.2)
PLATELET # BLD AUTO: 241 10*3/MM3 (ref 140–450)
PMV BLD AUTO: 11.3 FL (ref 6–12)
POTASSIUM BLD-SCNC: 4.3 MMOL/L (ref 3.5–5.2)
RBC # BLD AUTO: 4.68 10*6/MM3 (ref 4.14–5.8)
SODIUM BLD-SCNC: 140 MMOL/L (ref 136–145)
TROPONIN T SERPL-MCNC: <0.01 NG/ML (ref 0–0.03)
WBC NRBC COR # BLD: 17.38 10*3/MM3 (ref 3.4–10.8)

## 2020-02-13 PROCEDURE — A9270 NON-COVERED ITEM OR SERVICE: HCPCS | Performed by: UROLOGY

## 2020-02-13 PROCEDURE — 80170 ASSAY OF GENTAMICIN: CPT | Performed by: UROLOGY

## 2020-02-13 PROCEDURE — G0378 HOSPITAL OBSERVATION PER HR: HCPCS

## 2020-02-13 PROCEDURE — 63710000001 PRAVASTATIN 40 MG TABLET: Performed by: UROLOGY

## 2020-02-13 PROCEDURE — 93010 ELECTROCARDIOGRAM REPORT: CPT | Performed by: INTERNAL MEDICINE

## 2020-02-13 PROCEDURE — 63710000001 SENNA 8.6 MG TABLET: Performed by: UROLOGY

## 2020-02-13 PROCEDURE — 25010000002 VANCOMYCIN PER 500 MG: Performed by: UROLOGY

## 2020-02-13 PROCEDURE — 80048 BASIC METABOLIC PNL TOTAL CA: CPT | Performed by: UROLOGY

## 2020-02-13 PROCEDURE — 85025 COMPLETE CBC W/AUTO DIFF WBC: CPT | Performed by: UROLOGY

## 2020-02-13 PROCEDURE — 99214 OFFICE O/P EST MOD 30 MIN: CPT | Performed by: INTERNAL MEDICINE

## 2020-02-13 PROCEDURE — 63710000001 OXYCODONE-ACETAMINOPHEN 7.5-325 MG TABLET: Performed by: UROLOGY

## 2020-02-13 PROCEDURE — 25010000002 FLUCONAZOLE PER 200 MG: Performed by: UROLOGY

## 2020-02-13 PROCEDURE — 84484 ASSAY OF TROPONIN QUANT: CPT | Performed by: INTERNAL MEDICINE

## 2020-02-13 PROCEDURE — 93005 ELECTROCARDIOGRAM TRACING: CPT | Performed by: INTERNAL MEDICINE

## 2020-02-13 RX ORDER — ATENOLOL 25 MG/1
25 TABLET ORAL DAILY
Status: DISCONTINUED | OUTPATIENT
Start: 2020-02-14 | End: 2020-02-14 | Stop reason: HOSPADM

## 2020-02-13 RX ORDER — FLUCONAZOLE 2 MG/ML
400 INJECTION, SOLUTION INTRAVENOUS DAILY
Status: COMPLETED | OUTPATIENT
Start: 2020-02-14 | End: 2020-02-14

## 2020-02-13 RX ORDER — VANCOMYCIN HYDROCHLORIDE 1 G/200ML
1000 INJECTION, SOLUTION INTRAVENOUS ONCE
Status: COMPLETED | OUTPATIENT
Start: 2020-02-14 | End: 2020-02-14

## 2020-02-13 RX ORDER — LISINOPRIL 20 MG/1
20 TABLET ORAL DAILY
Status: DISCONTINUED | OUTPATIENT
Start: 2020-02-14 | End: 2020-02-14 | Stop reason: HOSPADM

## 2020-02-13 RX ADMIN — FLUCONAZOLE IN SODIUM CHLORIDE 400 MG: 2 INJECTION, SOLUTION INTRAVENOUS at 08:25

## 2020-02-13 RX ADMIN — VANCOMYCIN HYDROCHLORIDE 1000 MG: 1 INJECTION, SOLUTION INTRAVENOUS at 11:54

## 2020-02-13 RX ADMIN — OXYCODONE HYDROCHLORIDE AND ACETAMINOPHEN 2 TABLET: 7.5; 325 TABLET ORAL at 17:47

## 2020-02-13 RX ADMIN — PRAVASTATIN SODIUM 40 MG: 40 TABLET ORAL at 09:55

## 2020-02-13 RX ADMIN — SENNOSIDES 17.2 MG: 8.6 TABLET, FILM COATED ORAL at 21:56

## 2020-02-13 RX ADMIN — OXYCODONE HYDROCHLORIDE AND ACETAMINOPHEN 2 TABLET: 7.5; 325 TABLET ORAL at 12:37

## 2020-02-13 NOTE — PROGRESS NOTES
Discharge Planning Assessment  [facility]     Patient Name: Jake Alberts  MRN: [id number]  Today's Date: 2/13/2020    Admit Date: 2/12/2020    Discharge Needs Assessment     Row Name 02/13/20 1325       Living Environment    Lives With  spouse    Current Living Arrangements  home/apartment/condo    Potentially Unsafe Housing Conditions  other (see comments) no concerns     Primary Care Provided by  self    Provides Primary Care For  spouse    Family Caregiver if Needed  child(griffin), adult    Quality of Family Relationships  helpful;involved;supportive    Able to Return to Prior Arrangements  yes       Resource/Environmental Concerns    Resource/Environmental Concerns  none    Transportation Concerns  car, none       Transition Planning    Patient/Family Anticipates Transition to  home    Patient/Family Anticipated Services at Transition  none    Transportation Anticipated  family or friend will provide       Discharge Needs Assessment    Readmission Within the Last 30 Days  no previous admission in last 30 days    Concerns to be Addressed  no discharge needs identified;denies needs/concerns at this time    Equipment Currently Used at Home  none    Anticipated Changes Related to Illness  none    Equipment Needed After Discharge  none        Discharge Plan     Row Name 02/13/20 1325       Plan    Plan  Home; denies needs     Patient/Family in Agreement with Plan  yes    Plan Comments  CCP met with patient at bedside. CCP role explained and discharge planning discussed. Face sheet verified and DOMINGUEZ given. Patient's PCP is Dr. Ruff. Patient lives with his spouse, with a ramp to the entrance of the home. Patient is independent with his ADLS and does not use DME. Patient has access to shower chair but states his spouse normally uses it. Patient denies any history of home health/SNF. Patient's preferred pharmacy is [facility] in [place]. Patient states he is his wife's primary caregiver. Patient states she is  partially blind as he has to assist her with her ADLS. Patient states he has been under stress from being her caregiver and has been having trouble taking care of himself. Patient states it is hard for him to ask for help because he is afraid it seems like he doesn't want to care for her. CCP discussed it is normal to feel stressed and overwhelmed with being a caregiver to a loved one and offered support. Patient states his daughters assist him as they can but he is needing more assistance. CCP discussed different options to help alleviate the stress. CCP discussed in home caregivers which are private pay and provided in home caregiver list. Patient states he is a [place]; CCP provided patient with Aid and Attendance contact information and encouraged patient to call the [facility] to see if they are eligible for the benefit. CCP discussed Adult Day Centers a few times a week to give him time to rest and provided locations of day centers in his area. CCP provided patient with Caregiver support groups and information for his area as well. Patient states he does not feel like his spouse is ready for LTC because she is still independent but due to the loss of her eye sight; she requires a lot of assistance. CCP discussed independent living and assisted living options and provided Transitions book to patient. Patient denies any home health or PT needs for himself but states he feels like his spouse would benefit from home health for PT needs. CCP encouraged patient to call their primary physician to assist with referral for home health for his spouse. Patient denies any other needs or resources at this time. CCP will follow for any needs to arise. Denisha Flynn [facility]         Destination      Coordination has not been started for this encounter.      Durable Medical Equipment      Coordination has not been started for this encounter.      Dialysis/Infusion      Coordination has not been started for this encounter.      Home Medical  Care      Coordination has not been started for this encounter.      Therapy      Coordination has not been started for this encounter.      Community Resources      Coordination has not been started for this encounter.          Demographic Summary     Row Name 02/13/20 1324       General Information    Admission Type  observation    Arrived From  emergency department    Required Notices Provided  Observation Status Notice    Referral Source  admission list    Reason for Consult  discharge planning    Preferred Language  English     Used During This Interaction  no        Functional Status     Row Name 02/13/20 1324       Functional Status    Usual Activity Tolerance  good    Current Activity Tolerance  good       Functional Status, IADL    Medications  independent    Meal Preparation  independent    Housekeeping  independent    Laundry  independent    Shopping  independent       Mental Status    General Appearance WDL  WDL       Mental Status Summary    Recent Changes in Mental Status/Cognitive Functioning  no changes        Psychosocial    No documentation.       Abuse/Neglect    No documentation.       Legal    No documentation.       Substance Abuse    No documentation.       Patient Forms    No documentation.           TAMRA Greer

## 2020-02-13 NOTE — PROGRESS NOTES
"Pharmacokinetic Evaluation - Gentamicin    Jake Alberts is a 74 y.o. male on Gentamicin pharmacy to dose.  MRN: [id number]  : 1945    Day of Gentamicin therapy: 1  Indication: surgical prophylaxis; surgical prophylaxis after procedure for urinary sphincter.  Consulted by: Dr. Rosado  Goal trough: <1 mcg/mL  Duration:    Current dose: 490mg ( 5mg/kg) q24h extended interval  Other antimicrobials: none      Blood pressure 99/63, pulse 56, temperature 97.8 °F (36.6 °C), temperature source Oral, resp. rate 18, height 177.8 cm (70\"), weight (!) 136 kg (300 lb 2 oz), SpO2 93 %.  Results from last 7 days   Lab Units 20  0331   CREATININE mg/dL 1.30*     Estimated Creatinine Clearance: 71.3 mL/min (A) (by C-G formula based on SCr of 1.3 mg/dL (H)).         Intake/Output Summary (Last 24 hours) at 2020 0921  Last data filed at 2020 0445  Gross per 24 hour   Intake 500 ml   Output 725 ml   Net -225 ml       Cultures:   none      Dosing hx (include troughs if drawn):  Gentamicin 120mg - pre op    1325  Gentamicin 490 mg q24h    Gentamicin random @ 0331: 7.5mcg/mL  Assessment:  Based on random level according to dosing nomogram patient would require q36hr dosing.   Renal function maybe slightly unstable. Scr elevated at 1.3. On 20 scr was 1.05. Will monitor closely. Will adjust dose to q36h dosing, however will check a random in the am along with a scr.     Plan:  1) adjust Gentamicin 490 mg every 36 hours.  2) Next trough on  at 0600   3) Monitor for UOP and scr. Scr in am .    Cristiano Lira [facility]    "

## 2020-02-13 NOTE — PLAN OF CARE
Problem: Patient Care Overview  Goal: Plan of Care Review  Outcome: Ongoing (interventions implemented as appropriate)  Flowsheets (Taken 2/13/2020 1726)  Progress: improving  Plan of Care Reviewed With: patient  Outcome Summary: Without CP, Bradycardia this afternoon (reported by RN but not in flowsheet), OOB to chair, BP WNL

## 2020-02-13 NOTE — CONSULTS
Cardiology History & Physical / Consultation      Patient Name: Jake Alberts  Age/Sex: 74 y.o. male  : 1945  MRN: [id number]    Date of Admission: 2020  Date of Encounter Visit: 20  Encounter Provider: Joshua Tinsley MD  Referring Provider: Paxton Rosado MD  Place of Service: [facility] CARDIOLOGY  Patient Care Team:  Yun Ruff MD as PCP - General (Internal Medicine)  Joshua Tinsley MD as Consulting Physician (Cardiology)          Subjective:     Chief Complaint: stress incontinence     Reason for consultation: CP    History of Present Illness:  Jake Alberts is a 74 y.o. male with history of CAD, valavular disease, HTN, hyperlipidemia, diastolic dysfunction, RAFAEL, prostate cancer and previous TIA's. He was admitted to [facility] 20 to undergo placement of an artificial urinary sphincter for his stress incontinence by Dr. Paxton Rosado. He had been seen in the office the day prior by RENETTA Hobbs for preoperative clearance where he continued to report occasional non-radiating chest pain , lasting 5 minutes at the most. His last cardiac catheterization in 2018 noted mild nonobstructive CAD and he was felt to be stable from a cardiac standpoint for surgery. Last night, he reported an episode of non-radiating chest pain that last 5 minutes, and he reported it was similar to the chronic pain he often feels.  He says it last 2 to 3 minutes.  No associated symptoms or shortness of breath diaphoresis.  Patient said he thought someone could just listen and see if we could find the cause of his discomfort even now this is the discomfort he was cath with about a year and a half ago and has not changed in character.    Previous Cardiac Testing:    Echocardiogram 18      Cardiac Catheterization 18  Conclusions:   1. Left main: Diffuse 20% stenosis  2. LAD: 20% mid vessel stenosis  3. LCX:  20-30% proximal stenosis  4. RCA: Tubular 40% mid vessel stenosis  5.  Normal left ventricular size and systolic function  6.  Elevated left ventricular end-diastolic pressure.  Recommendations: Weight loss.  Continue medical management.      Past Medical History:  Past Medical History:   Diagnosis Date   • Anemia    • CAD (coronary artery disease)    • Chest pain    • Diastolic dysfunction     Stage I   • Dizziness and giddiness    • Dyspnea    • Hyperlipidemia    • Hypertension    • Left ventricular hypertrophy     Mild concentric   • Mitral regurgitation     Trace   • Morbid obesity with BMI of 40.0-44.9, adult (CMS/[facility])    • RAFAEL on CPAP     CPAP OFF/ON   • Pes anserine bursitis    • Prostate cancer (CMS/[facility])    • Rotator cuff tendinitis    • Sinusitis    • TIA (transient ischemic attack) 2009   • Tricuspid regurgitation        Past Surgical History:   Procedure Laterality Date   • CARDIAC CATHETERIZATION N/A 12/18/2018    Procedure: Left Heart Cath;  Surgeon: Paxton Grant MD;  Location: [facility] CATH INVASIVE LOCATION;  Service: Cardiovascular   • CARDIAC CATHETERIZATION N/A 12/18/2018    Procedure: Coronary angiography;  Surgeon: Paxton Grant MD;  [facility] C[facility];  Service: Cardiovascular   • CARDIAC CATHETERIZATION N/A 12/18/2018    Procedure: Left ventriculography;  Surgeon: Paxton Grant MD;  Location:  [facility] CATH INVASIVE LOCATION;  Service: Cardiovascular   • COLONOSCOPY N/A 12/5/2016    Procedure: COLONOSCOPY with polypectomy (cold biopsy);  Surgeon: Alex Gallego MD;  Location: [facility] ENDOSCOPY;  Service:    • EYE SURGERY      CATARACTS   • KNEE SURGERY Left 1966   • PROSTATE SURGERY  2014   • SHOULDER SURGERY Left 2013       Home Medications:   Medications Prior to Admission   Medication Sig Dispense Refill Last Dose   • atenolol (TENORMIN) 50 MG tablet Take 50 mg by mouth Daily.   2/11/2020 at 1800   • ferrous sulfate 325 (65 Fe) MG tablet Take 325  mg by mouth.   2020 at Unknown time   • lisinopril (PRINIVIL,ZESTRIL) 40 MG tablet Take 40 mg by mouth Daily.   2020 at Unknown time   • NIFEdipine CC (ADALAT CC) 60 MG 24 hr tablet Take 60 mg by mouth Daily.   2020 at Unknown time   • oxybutynin XL (DITROPAN XL) 15 MG 24 hr tablet Take 15 mg by mouth Daily.   2020 at Unknown time   • pravastatin (PRAVACHOL) 40 MG tablet Take 40 mg by mouth Daily.   2020 at Unknown time   • triamterene-hydrochlorothiazide (MAXZIDE-25) 37.5-25 MG per tablet Take 1 tablet by mouth Daily.   2020 at Unknown time   • ASPIR-LOW 81 MG EC tablet Take 81 mg by mouth Every Night.   2020   • Cyanocobalamin (VITAMIN B-12 PO) Take 2,500 mcg by mouth Daily.   2020       Allergies:  No Known Allergies    Past Social History:  Social History     Socioeconomic History   • Marital status:      Spouse name: Not on file   • Number of children: Not on file   • Years of education: Not on file   • Highest education level: Not on file   Tobacco Use   • Smoking status: Former Smoker     Types: Cigarettes     Last attempt to quit:      Years since quittin.1   • Smokeless tobacco: Never Used   • Tobacco comment: Caffeine use -6 drinks per day   Substance and Sexual Activity   • Alcohol use: Yes     Comment: social   • Drug use: No   • Sexual activity: Defer       Past Family History: History reviewed. No pertinent family history.   Family History   Problem Relation Age of Onset   • Heart disease Mother    • Hypertension Mother    • Heart disease Father    • Hypertension Father    • Diabetes Sister    • Hypertension Sister    • Diabetes Brother    • Heart disease Brother    • Hypertension Brother    • Heart disease Brother    • Hypertension Brother    • Diabetes Brother    • Hypertension Brother    • Malig Hyperthermia Neg Hx        Review of Systems   All other systems reviewed and are negative.          Objective:     Objective:  Temp:  [97.4 °F (36.3  °C)-100 °F (37.8 °C)] 97.8 °F (36.6 °C)  Heart Rate:  [56-66] 56  Resp:  [10-18] 18  BP: ()/(47-94) 99/63    Intake/Output Summary (Last 24 hours) at 2/13/2020 0906  Last data filed at 2/13/2020 0445  Gross per 24 hour   Intake 500 ml   Output 725 ml   Net -225 ml     Body mass index is 43.06 kg/m².      02/12/20  1139   Weight: (!) 136 kg (300 lb 2 oz)           Physical Exam:   Physical Exam   Constitutional: He is oriented to person, place, and time. He appears well-developed.   HENT:   Head: Normocephalic.   Eyes: Conjunctivae are normal.   Neck: Normal range of motion.   Cardiovascular: Normal rate, regular rhythm and normal heart sounds.   Pulmonary/Chest: Breath sounds normal.   Abdominal: Soft. Bowel sounds are normal.   Musculoskeletal: Normal range of motion. He exhibits no edema.   Neurological: He is alert and oriented to person, place, and time.   Skin: Skin is warm and dry.   Psychiatric: He has a normal mood and affect. His behavior is normal.   Vitals reviewed.       Labs:   Lab Review:                                                 PREVIOUS EKG 2/1/19        EKG 2/13/20          Assessment:       Stress incontinence, male        Plan:      1.  Chest discomfort this is exactly similar to what he has had now for many years.  He had undergone evaluation in the past with no cardiac etiology.  Has not changed in character and therefore does not represent anything acute.  Will check troponins for completeness sake but doubt cardiac in origin.  2.  Hypotension patient's blood pressure is actually down quite a bit today.  I changed quite a few of his medications stopped his Procardia water pill in half to his other medications.  I would watch him for 24 hours but see what happens with his blood pressure.  Told to ambulate if okay with urology.  3.  Artificial sphincter implantation yesterday.  Patient says he is a little bit sore but overall doing well.  4.  We will follow in house see what  evolves.    Thank you for allowing me to participate in the care of Jake Alberts. Feel free to contact me directly with any further questions or concerns.    Joshua Tinsley MD  [facility]  02/13/20  9:06 AM

## 2020-02-13 NOTE — PROGRESS NOTES
"  First Urology Progress Note    Chief Complaint:  Scrotal pain    Doing better, events noted last nite then hypotension this am  Pain contorlled  Still has his gaston for some reason    Review of Systems:    The following systems were reviewed and negative;  respiratory, gastrointestinal and musculoskeletal          Vital Signs  /69 (BP Location: Left arm, Patient Position: Lying)   Pulse 64   Temp 97.8 °F (36.6 °C) (Oral)   Resp 18   Ht 177.8 cm (70\")   Wt (!) 136 kg (300 lb 2 oz)   SpO2 92%   BMI 43.06 kg/m²     Physical Exam:     General Appearance:    Alert, cooperative, NAD   HEENT:    No trauma, pupils reactive, hearing intact   Back:     No CVA tenderness   Lungs:     Respirations unlabored, no wheezing    Heart:    RRR, intact peripheral pulses   Abdomen:     Soft, NDNT, no masses, no guarding   :    Penis nl with kin ecchymosis   Extremities:   No edema, no deformity   Lymphatic:   No neck or groin LAD   Skin:   No bleeding, bruising or rashes   Neuro/Psych:   Orientation intact, mood/affect pleasant, no focal findings        Results Review:     I reviewed the patient's new clinical results.  Lab Results (last 24 hours)     Procedure Component Value Units Date/Time    Troponin [[id number]]  (Normal) Collected:  02/13/20 0331    Specimen:  Blood Updated:  02/13/20 0938     Troponin T <0.010 ng/mL     Narrative:       Troponin T Reference Range:  <= 0.03 ng/mL-   Negative for AMI  >0.03 ng/mL-     Abnormal for myocardial necrosis.  Clinicians would have to utilize clinical acumen, EKG, Troponin and serial changes to determine if it is an Acute Myocardial Infarction or myocardial injury due to an underlying chronic condition.       Results may be falsely decreased if patient taking Biotin.      CBC & Differential [[id number]] Collected:  02/13/20 0850    Specimen:  Blood Updated:  02/13/20 0932    Narrative:       The following orders were created for panel order CBC & Differential.  Procedure     "                           Abnormality         Status                     ---------                               -----------         ------                     CBC Auto Differential[[id number]]        Abnormal            Final result                 Please view results for these tests on the individual orders.    CBC Auto Differential [[id number]]  (Abnormal) Collected:  02/13/20 0850    Specimen:  Blood Updated:  02/13/20 0932     WBC 17.38 10*3/mm3      RBC 4.68 10*6/mm3      Hemoglobin 13.0 g/dL      Hematocrit 39.3 %      MCV 84.0 fL      MCH 27.8 pg      MCHC 33.1 g/dL      RDW 13.7 %      RDW-SD 41.5 fl      MPV 11.3 fL      Platelets 241 10*3/mm3      Neutrophil % 83.8 %      Lymphocyte % 7.8 %      Monocyte % 7.5 %      Eosinophil % 0.0 %      Basophil % 0.2 %      Immature Grans % 0.7 %      Neutrophils, Absolute 14.57 10*3/mm3      Lymphocytes, Absolute 1.36 10*3/mm3      Monocytes, Absolute 1.30 10*3/mm3      Eosinophils, Absolute 0.00 10*3/mm3      Basophils, Absolute 0.03 10*3/mm3      Immature Grans, Absolute 0.12 10*3/mm3      nRBC 0.0 /100 WBC     Basic Metabolic Panel [[id number]]  (Abnormal) Collected:  02/13/20 0331    Specimen:  Blood Updated:  02/13/20 0915     Glucose 133 mg/dL      BUN 27 mg/dL      Creatinine 1.30 mg/dL      Sodium 140 mmol/L      Potassium 4.3 mmol/L      Chloride 102 mmol/L      CO2 19.4 mmol/L      Calcium 8.4 mg/dL      eGFR Non African Amer 54 mL/min/1.73      BUN/Creatinine Ratio 20.8     Anion Gap 18.6 mmol/L     Narrative:       GFR Normal >60  Chronic Kidney Disease <60  Kidney Failure <15      Gentamicin Level, Random [[id number]]  (Normal) Collected:  02/13/20 0331    Specimen:  Blood Updated:  02/13/20 0530     Gentamicin Random 7.50 mcg/mL         Imaging Results (Last 24 Hours)     ** No results found for the last 24 hours. **          Medication Review:   I have personally reviewed    Current Facility-Administered Medications:   •  [START ON 2/14/2020] atenolol  (TENORMIN) tablet 25 mg, 25 mg, Oral, Daily, Joshua Tinsley MD  •  [START ON 2/14/2020] fluconazole (DIFLUCAN) IVPB 400 mg, 400 mg, Intravenous, Daily, Paxton Rosado MD  •  [START ON 2/14/2020] gentamicin (GARAMYCIN) 490 mg in sodium chloride 0.9 % IVPB, 5 mg/kg (Adjusted), Intravenous, Q36H, Paxton Rosado MD  •  HYDROmorphone (DILAUDID) injection 0.5 mg, 0.5 mg, Intravenous, Q2H PRN **AND** naloxone (NARCAN) injection 0.1 mg, 0.1 mg, Intravenous, Q5 Min PRN, Paxton Rosado MD  •  [START ON 2/14/2020] lisinopril (PRINIVIL,ZESTRIL) tablet 20 mg, 20 mg, Oral, Daily, Joshua Tinsley MD  •  ondansetron (ZOFRAN) tablet 4 mg, 4 mg, Oral, Q6H PRN **OR** ondansetron (ZOFRAN) injection 4 mg, 4 mg, Intravenous, Q6H PRN, Paxton Rosado MD  •  oxyCODONE-acetaminophen (PERCOCET) 7.5-325 MG per tablet 2 tablet, 2 tablet, Oral, Q4H PRN, Paxton Rosado MD, 2 tablet at 02/13/20 1747  •  Pharmacy to Dose gentamicin (GARAMYCIN), , Does not apply, Continuous PRN, Paxton Rosado MD  •  pravastatin (PRAVACHOL) tablet 40 mg, 40 mg, Oral, Daily, Paxton Rosado MD, 40 mg at 02/13/20 0955  •  senna (SENOKOT) tablet 17.2 mg, 2 tablet, Oral, Nightly, Paxton Rosado MD, 17.2 mg at 02/12/20 2033  •  sodium chloride 0.9 % infusion, 75 mL/hr, Intravenous, Continuous, Paxton Rosado MD, Last Rate: 75 mL/hr at 02/13/20 1723, 75 mL/hr at 02/13/20 1723  •  [START ON 2/14/2020] vancomycin (VANCOCIN) in iso-osmotic dextrose IVPB 1 g (premix) 200 mL, 1,000 mg, Intravenous, Once, Paxton Rosado MD    Allergies:    Patient has no known allergies.    Assessment:    Active Problems:  Patient Active Problem List   Diagnosis   • CAD (coronary artery disease)   • Hypertension   • Abnormal nuclear cardiac imaging test   • Acute bronchitis with bronchospasm   • Encounter for examination and observation for other specified reasons   • History of malignant neoplasm of prostate   •  Hyperlipidemia   • Other shoulder lesions, unspecified shoulder   • Rotator cuff tendonitis   • Sinusitis   • Pre-operative cardiovascular examination   • Morbid obesity with BMI of 40.0-44.9, adult (CMS/[facility])   • Stress incontinence, male       POD#1 LINDA    Plan:    Home tomorrow if ok with cardiology       Paxton Rosado MD    2/13/2020  6:11 PM

## 2020-02-13 NOTE — NURSING NOTE
Spoke to MD Karlene vila pts CP. Per cardiology, no new orders were placed.  Pt placed on tele per preference of urology.

## 2020-02-14 VITALS
DIASTOLIC BLOOD PRESSURE: 70 MMHG | HEIGHT: 70 IN | SYSTOLIC BLOOD PRESSURE: 115 MMHG | RESPIRATION RATE: 18 BRPM | HEART RATE: 62 BPM | BODY MASS INDEX: 42.97 KG/M2 | TEMPERATURE: 97.7 F | WEIGHT: 300.13 LBS | OXYGEN SATURATION: 94 %

## 2020-02-14 LAB
CREAT BLD-MCNC: 1.33 MG/DL (ref 0.76–1.27)
GENTAMICIN SERPL-MCNC: 0.6 MCG/ML (ref 0.5–10)
GFR SERPL CREATININE-BSD FRML MDRD: 53 ML/MIN/1.73

## 2020-02-14 PROCEDURE — 25010000002 FLUCONAZOLE PER 200 MG: Performed by: UROLOGY

## 2020-02-14 PROCEDURE — 25010000002 VANCOMYCIN PER 500 MG: Performed by: UROLOGY

## 2020-02-14 PROCEDURE — A9270 NON-COVERED ITEM OR SERVICE: HCPCS | Performed by: INTERNAL MEDICINE

## 2020-02-14 PROCEDURE — 82565 ASSAY OF CREATININE: CPT | Performed by: UROLOGY

## 2020-02-14 PROCEDURE — 25010000002 GENTAMICIN PER 80 MG: Performed by: UROLOGY

## 2020-02-14 PROCEDURE — 63710000001 LISINOPRIL 20 MG TABLET: Performed by: INTERNAL MEDICINE

## 2020-02-14 PROCEDURE — 80170 ASSAY OF GENTAMICIN: CPT | Performed by: UROLOGY

## 2020-02-14 PROCEDURE — 63710000001 PRAVASTATIN 40 MG TABLET: Performed by: UROLOGY

## 2020-02-14 PROCEDURE — A9270 NON-COVERED ITEM OR SERVICE: HCPCS | Performed by: UROLOGY

## 2020-02-14 PROCEDURE — 99213 OFFICE O/P EST LOW 20 MIN: CPT | Performed by: INTERNAL MEDICINE

## 2020-02-14 PROCEDURE — 63710000001 ATENOLOL 25 MG TABLET: Performed by: INTERNAL MEDICINE

## 2020-02-14 PROCEDURE — G0378 HOSPITAL OBSERVATION PER HR: HCPCS

## 2020-02-14 RX ORDER — SULFAMETHOXAZOLE AND TRIMETHOPRIM 800; 160 MG/1; MG/1
1 TABLET ORAL 2 TIMES DAILY
Qty: 28 TABLET | Refills: 0 | Status: SHIPPED | OUTPATIENT
Start: 2020-02-14 | End: 2021-04-13

## 2020-02-14 RX ORDER — SENNOSIDES 8.6 MG
2 TABLET ORAL NIGHTLY
Qty: 60 TABLET | Refills: 0 | Status: SHIPPED | OUTPATIENT
Start: 2020-02-14 | End: 2021-04-13

## 2020-02-14 RX ORDER — OXYCODONE HYDROCHLORIDE AND ACETAMINOPHEN 5; 325 MG/1; MG/1
1 TABLET ORAL EVERY 6 HOURS PRN
Qty: 20 TABLET | Refills: 0 | Status: SHIPPED | OUTPATIENT
Start: 2020-02-14 | End: 2021-04-13

## 2020-02-14 RX ADMIN — ATENOLOL 25 MG: 25 TABLET ORAL at 09:10

## 2020-02-14 RX ADMIN — PRAVASTATIN SODIUM 40 MG: 40 TABLET ORAL at 09:10

## 2020-02-14 RX ADMIN — GENTAMICIN SULFATE 490 MG: 40 INJECTION, SOLUTION INTRAMUSCULAR; INTRAVENOUS at 06:32

## 2020-02-14 RX ADMIN — FLUCONAZOLE IN SODIUM CHLORIDE 400 MG: 2 INJECTION, SOLUTION INTRAVENOUS at 09:10

## 2020-02-14 RX ADMIN — LISINOPRIL 20 MG: 20 TABLET ORAL at 09:10

## 2020-02-14 RX ADMIN — VANCOMYCIN HYDROCHLORIDE 1000 MG: 1 INJECTION, SOLUTION INTRAVENOUS at 11:58

## 2020-02-14 RX ADMIN — SODIUM CHLORIDE 75 ML/HR: 9 INJECTION, SOLUTION INTRAVENOUS at 00:11

## 2020-02-14 NOTE — PLAN OF CARE
Problem: Patient Care Overview  Goal: Plan of Care Review  Outcome: Ongoing (interventions implemented as appropriate)  Flowsheets (Taken 2/14/2020 2003)  Plan of Care Reviewed With: patient  Outcome Summary: VSS on RA. No c/o pain, n/v, or SOA. Ice applied to scrotum. Frequent dribbling/incontinence. Continue to monitor.

## 2020-02-14 NOTE — DISCHARGE SUMMARY
Date of Discharge:  02/14/2020    Discharge Diagnosis: Stress urinary incontinence    Presenting Problem/History of Present Illness  Stress incontinence, male [N39.3]     74-year-old gentleman with stress urinary incontinence post prostatectomy now presents for insertion of artificial urinary sphincter.  Hospital Course  Patient is a 74 y.o. male presented with stress incontinence post prostatectomy.  He underwent artificial urinary sphincter placement on Wednesday.  That evening he developed some chest pain he was moved to the telemetry unit.  He cardiology was consulted.  They did not appear to be an acute cardiac event.  He was hypotensive the following morning but responded to fluids and they adjusted his blood pressure medications and he has not had any issues since then.  He received perioperative antibiotics.  Counts are normal his pain is minimal.  No signs of cellulitis.  He will be discharged today..      Procedures Performed  Procedure(s):  INSERTION OF ARTIFICIAL URINARY SPHINCTER       Consults:   Consults     Date and Time Order Name Status Description    2/12/2020 2216 Inpatient Cardiology Consult Completed           Pertinent Test Results: labs: Routine      Condition on Discharge: Good    Vital Signs  Temp:  [97.4 °F (36.3 °C)-97.8 °F (36.6 °C)] 97.7 °F (36.5 °C)  Heart Rate:  [49-64] 62  Resp:  [18] 18  BP: (115-143)/(69-71) 115/70    Physical Exam:   Scrotum with minimal ecchymoses and no cellulitis.    Discharge Disposition  Home or Self Care    Discharge Medications     Discharge Medications      New Medications      Instructions Start Date   oxyCODONE-acetaminophen 5-325 MG per tablet  Commonly known as:  PERCOCET   1 tablet, Oral, Every 6 Hours PRN      senna 8.6 MG tablet tablet  Commonly known as:  SENOKOT   17.2 mg, Oral, Nightly      sulfamethoxazole-trimethoprim 800-160 MG per tablet  Commonly known as:  BACTRIM DS   160 mg, Oral, 2 Times Daily         Continue These Medications       Instructions Start Date   ASPIR-LOW 81 MG EC tablet  Generic drug:  aspirin   81 mg, Oral, Nightly      atenolol 50 MG tablet  Commonly known as:  TENORMIN   50 mg, Oral, Daily      ferrous sulfate 325 (65 Fe) MG tablet   325 mg, Oral      lisinopril 40 MG tablet  Commonly known as:  PRINIVIL,ZESTRIL   40 mg, Oral, Daily      NIFEdipine CC 60 MG 24 hr tablet  Commonly known as:  ADALAT CC   60 mg, Oral, Daily      oxybutynin XL 15 MG 24 hr tablet  Commonly known as:  DITROPAN XL   15 mg, Oral, Daily      pravastatin 40 MG tablet  Commonly known as:  PRAVACHOL   40 mg, Oral, Daily      triamterene-hydrochlorothiazide 37.5-25 MG per tablet  Commonly known as:  MAXZIDE-25   1 tablet, Oral, Daily      VITAMIN B-12 PO   2,500 mcg, Oral, Daily             Discharge Diet: Regular    Activity at Discharge: Outlined to the patient with limited sitting for extended periods of time and limited lifting pushing and pulling.    Follow-up Appointments  Future Appointments   Date Time Provider Department Center   2/16/2021  1:40 PM Joshua Tinsley MD [id number] None     Additional Instructions for the Follow-ups that You Need to Schedule     Discharge Follow-up with Specified Provider: michelle sterling; 2 Weeks   As directed      To:  michelle sterling    Follow Up:  2 Weeks               Test Results Pending at Discharge       Paxton Sterling MD  02/14/20  1:35 PM    Time: Discharge 15 min

## 2020-02-14 NOTE — PROGRESS NOTES
Hospital Follow Up    LOS:  LOS: 0 days   Patient Name: Jake Alberts  Age/Sex: 74 y.o. male  : 1945  MRN: [id number]    Day of Service: 20   Length of Stay: 0  Encounter Provider: Joshua Tinsley MD  Place of Service: [facility] CARDIOLOGY  Patient Care Team:  Yun Ruff MD as PCP - General (Internal Medicine)  Joshua Tinsley MD as Consulting Physician (Cardiology)    Subjective:     Chief Complaint: Chest pain    Interval History: Patient is doing well no further issues.    Objective:     Objective:  Temp:  [97 °F (36.1 °C)-97.8 °F (36.6 °C)] 97.7 °F (36.5 °C)  Heart Rate:  [49-68] 49  Resp:  [18] 18  BP: ()/(63-71) 115/70     Intake/Output Summary (Last 24 hours) at 2020 0820  Last data filed at 2020 2345  Gross per 24 hour   Intake 210 ml   Output 625 ml   Net -415 ml     Body mass index is 43.06 kg/m².      20  1139   Weight: (!) 136 kg (300 lb 2 oz)     Weight change:       Physical Exam:   General : Alert, cooperative, in no acute distress.  Neuro: Alert,cooperative and oriented.  Lungs: CTAB. Normal respiratory effort and rate.  CV: Regular rate and rhythm, normal S1 and S2, no murmurs, gallops or rubs.  ABD: Soft, nontender, nondistended. Positive bowel sounds.  Extr: No edema or cyanosis, moves all extremities.    Lab Review:   Results from last 7 days   Lab Units 20  0440 20  0331   SODIUM mmol/L  --  140   POTASSIUM mmol/L  --  4.3   CHLORIDE mmol/L  --  102   CO2 mmol/L  --  19.4*   BUN mg/dL  --  27*   CREATININE mg/dL 1.33* 1.30*   GLUCOSE mg/dL  --  133*   CALCIUM mg/dL  --  8.4*     Results from last 7 days   Lab Units 20  0331   TROPONIN T ng/mL <0.010     Results from last 7 days   Lab Units 20  0850   WBC 10*3/mm3 17.38*   HEMOGLOBIN g/dL 13.0   HEMATOCRIT % 39.3   PLATELETS 10*3/mm3 241                   Current Medications:   Scheduled Meds:  atenolol 25 mg Oral Daily    fluconazole 400 mg Intravenous Daily   lisinopril 20 mg Oral Daily   pravastatin 40 mg Oral Daily   senna 2 tablet Oral Nightly   vancomycin 1,000 mg Intravenous Once     Continuous Infusions:  Pharmacy to Dose gentamicin (GARAMYCIN)     sodium chloride 75 mL/hr Last Rate: 75 mL/hr (02/14/20 0646)       Allergies:  No Known Allergies    Assessment:       Stress incontinence, male        Plan:    1.  Chest discomfort this is exactly similar to what he has had now for many years.  He had undergone evaluation in the past with no cardiac etiology.  Has not changed in character and therefore does not represent anything acute.  Will check troponins for completeness sake but doubt cardiac in origin.  2.  Hypotension patient's blood pressure is actually down quite a bit today.  I changed quite a few of his medications stopped his Procardia water pill in half to his other medications.  I would watch him for 24 hours but see what happens with his blood pressure.  Told to ambulate if okay with urology.  3.  Artificial sphincter implantation yesterday.  Patient says he is a little bit sore but overall doing well.  4.  No further episodes.  Patient clinically is doing well okay to discharge home follow-up in several months.  I will sign off        Joshua Tinsley MD  02/14/20  8:20 AM

## 2020-02-17 NOTE — PROGRESS NOTES
Case Management Discharge Note      Final Note: Home with spouse; no needs.  Transported by family         Destination      No service has been selected for the patient.      Durable Medical Equipment      No service has been selected for the patient.      Dialysis/Infusion      No service has been selected for the patient.      Home Medical Care      No service has been selected for the patient.      Therapy      No service has been selected for the patient.      Community Resources      No service has been selected for the patient.        Transportation Services  Private: Car    Final Discharge Disposition Code: 01 - home or self-care

## 2020-02-26 ENCOUNTER — OFFICE VISIT (OUTPATIENT)
Dept: CARDIOLOGY | Facility: CLINIC | Age: 75
End: 2020-02-26

## 2020-02-26 VITALS
HEIGHT: 70 IN | DIASTOLIC BLOOD PRESSURE: 72 MMHG | WEIGHT: 300 LBS | SYSTOLIC BLOOD PRESSURE: 116 MMHG | BODY MASS INDEX: 42.95 KG/M2 | HEART RATE: 55 BPM | OXYGEN SATURATION: 97 %

## 2020-02-26 DIAGNOSIS — I73.9 CLAUDICATION (HCC): ICD-10-CM

## 2020-02-26 DIAGNOSIS — I10 ESSENTIAL HYPERTENSION: ICD-10-CM

## 2020-02-26 DIAGNOSIS — E78.5 HYPERLIPIDEMIA, UNSPECIFIED HYPERLIPIDEMIA TYPE: ICD-10-CM

## 2020-02-26 DIAGNOSIS — I25.10 CORONARY ARTERY DISEASE INVOLVING NATIVE CORONARY ARTERY OF NATIVE HEART WITHOUT ANGINA PECTORIS: Primary | ICD-10-CM

## 2020-02-26 PROCEDURE — 99214 OFFICE O/P EST MOD 30 MIN: CPT | Performed by: NURSE PRACTITIONER

## 2020-02-26 NOTE — PROGRESS NOTES
[facility] CARDIOLOGY  [address]  [facility] [address]  Phone: [phone]      Patient Name: Jake Alberts  :1945  Age: 74 y.o.  Primary Cardiologist: Joshua Tinsley MD  Encounter Provider:  RENETTA Salas      Chief Complaint:   Chief Complaint   Patient presents with   • Follow-up     1 week Hospital         HPI  Jake Alberts is a 74 y.o. male who presents today for hospital reevaluation.     Pt has a  history significant for CAD, hypertension, hyperlipidemia, obesity, prostate cancer.    Patient was hospitalized 2024 artificial urinary sphincter placement.  That evening patient developed chest pain.  He was evaluated by Dr. Tinsley.  Patient's chest pain was similar to what he has had for several years.  Cardiac enzymes were negative.  Patient did not require any further cardiac work-up.  Patient did have hypotension during hospitalization and his Procardia was stopped and diuretic was decreased.    Patient states that he has done well since discharge.  He has not had any further episodes of chest pain since discharge.  Patient has been taking the same doses of all of his medications that he was taking before admission. He has not had any episodes of hypotension.  He denies lightheadedness, fatigue, shortness of breath.  He does have some mild but stable lower extremity edema.     The following portions of the patient's history were reviewed and updated as appropriate: allergies, current medications, past family history, past medical history, past social history, past surgical history and problem list.      Review of Systems   Constitution: Negative for malaise/fatigue.   Cardiovascular: Positive for leg swelling. Negative for chest pain.   Respiratory: Negative for shortness of breath.    Neurological: Negative for light-headedness.   All other systems reviewed and are negative.      OBJECTIVE:     Vitals:    20 1335   BP:  "116/72   BP Location: Right arm   Patient Position: Sitting   Pulse: 55   SpO2: 97%   Weight: 136 kg (300 lb)   Height: 177.8 cm (70\")     Body mass index is 43.05 kg/m².    Physical Exam   Constitutional: He is oriented to person, place, and time. Vital signs are normal. He appears well-developed and well-nourished.   Eyes: Conjunctivae are normal.   Neck: Carotid bruit is not present.   Cardiovascular: Normal rate, regular rhythm and normal heart sounds.   No murmur heard.  Pulmonary/Chest: Effort normal and breath sounds normal.   Abdominal: Normal appearance.   Musculoskeletal: Normal range of motion.   No pedal edema   Neurological: He is alert and oriented to person, place, and time. GCS eye subscore is 4. GCS verbal subscore is 5. GCS motor subscore is 6.   Skin: Skin is warm and dry.   Psychiatric: He has a normal mood and affect. His speech is normal and behavior is normal. Judgment and thought content normal. Cognition and memory are normal.       Procedures    Cardiac Procedures:  1. Cardiac catheterization 12/18/2018.  Left main diffuse 20% stenosis.  LAD 20% mid vessel stenosis.  Left circumflex 20-30% proximal stenosis.  RCA tubular 40% mid vessel stenosis.  Normal LV size and systolic function.  Elevated LV and diastolic pressure.  Recommend weight loss and medical management.        ASSESSMENT:      Diagnosis Plan   1. Coronary artery disease involving native coronary artery of native heart without angina pectoris     2. Hyperlipidemia, unspecified hyperlipidemia type     3. Essential hypertension     4. Claudication (CMS/[facility])  Doppler Arterial Multi Level Lower Extremity - Bilateral CAR         PLAN OF CARE:     1. History of CAD.  Patient with mild nonobstructive coronary artery disease.  Patient states that he has not had any further episodes of chest pain.  He will continue his current regimen of aspirin, atenolol, lisinopril, pravastatin.  2. Hyperlipidemia.  Continue " pravastatin.  3. Hypertension.  Blood pressure controlled at 116/72.  Patient back on Procardia and full dose diuretic.  States that he is tolerating this well without any episodes of lightheadedness or near syncope.  4. Claudication.  Patient reports numbness and tingling to bilateral lower extremities especially his toes.  He also notes hair loss to his lower extremities.  Will get FERMIN to assess for PAD.  Patient also reports that he has chronic lower back problems and claudication could be as a result of this.  He has never had assessment for PAD.  5. Keep your previously scheduled appointment Dr. Tinsley next year.  Please call the office sooner for problems or complications.           Discharge Medications           Accurate as of February 26, 2020  2:17 PM. If you have any questions, ask your nurse or doctor.               Continue These Medications      Instructions Start Date   ASPIR-LOW 81 MG EC tablet  Generic drug:  aspirin   81 mg, Oral, Nightly      atenolol 50 MG tablet  Commonly known as:  TENORMIN   50 mg, Oral, Daily      ferrous sulfate 325 (65 Fe) MG tablet   325 mg, Oral      lisinopril 40 MG tablet  Commonly known as:  PRINIVIL,ZESTRIL   40 mg, Oral, Daily      NIFEdipine CC 60 MG 24 hr tablet  Commonly known as:  ADALAT CC   60 mg, Oral, Daily      oxybutynin XL 15 MG 24 hr tablet  Commonly known as:  DITROPAN XL   15 mg, Oral, Daily      oxyCODONE-acetaminophen 5-325 MG per tablet  Commonly known as:  PERCOCET   1 tablet, Oral, Every 6 Hours PRN      pravastatin 40 MG tablet  Commonly known as:  PRAVACHOL   40 mg, Oral, Daily      senna 8.6 MG tablet tablet  Commonly known as:  SENOKOT   17.2 mg, Oral, Nightly      sulfamethoxazole-trimethoprim 800-160 MG per tablet  Commonly known as:  BACTRIM DS   160 mg, Oral, 2 Times Daily      triamterene-hydrochlorothiazide 37.5-25 MG per tablet  Commonly known as:  MAXZIDE-25   1 tablet, Oral, Daily      VITAMIN B-12 PO   2,500 mcg, Oral, Daily              Thank you for allowing me to participate in the care of your patient,         Ana GAUTAM Elmore, RENETTA  [facility]  02/26/20  1:36 PM      **Live Disclaimer:**  Much of this encounter note is an electronic transcription/translation of spoken language to printed text. The electronic translation of spoken language may permit erroneous, or at times, nonsensical words or phrases to be inadvertently transcribed. Although I have reviewed the note for such errors, some may still exist.

## 2020-03-13 ENCOUNTER — HOSPITAL ENCOUNTER (OUTPATIENT)
Dept: CARDIOLOGY | Facility: HOSPITAL | Age: 75
Discharge: HOME | End: 2020-03-13
Admitting: NURSE PRACTITIONER

## 2020-03-13 DIAGNOSIS — I73.9 CLAUDICATION (HCC): ICD-10-CM

## 2020-03-13 LAB
BH CV LOWER ARTERIAL LEFT ABI RATIO: 1.21
BH CV LOWER ARTERIAL LEFT GREAT TOE SYS MAX: 122 MMHG
BH CV LOWER ARTERIAL LEFT HIGH THIGH SYS MAX: 203 MMHG
BH CV LOWER ARTERIAL LEFT POPLITEAL SYS MAX: 172 MMHG
BH CV LOWER ARTERIAL LEFT POST TIBIAL SYS MAX: 170 MMHG
BH CV LOWER ARTERIAL RIGHT ABI RATIO: 1.16
BH CV LOWER ARTERIAL RIGHT GREAT TOE SYS MAX: 94 MMHG
BH CV LOWER ARTERIAL RIGHT HIGH THIGH SYS MAX: 171 MMHG
BH CV LOWER ARTERIAL RIGHT POPLITEAL SYS MAX: 162 MMHG
BH CV LOWER ARTERIAL RIGHT POST TIBIAL SYS MAX: 163 MMHG
UPPER ARTERIAL LEFT ARM BRACHIAL SYS MAX: 140 MMHG
UPPER ARTERIAL RIGHT ARM BRACHIAL SYS MAX: 141 MMHG

## 2020-03-13 PROCEDURE — 93923 UPR/LXTR ART STDY 3+ LVLS: CPT | Performed by: INTERNAL MEDICINE

## 2020-03-13 PROCEDURE — 93923 UPR/LXTR ART STDY 3+ LVLS: CPT

## 2020-06-19 ENCOUNTER — LAB (OUTPATIENT)
Dept: LAB | Facility: HOSPITAL | Age: 75
End: 2020-06-19

## 2020-06-19 ENCOUNTER — TRANSCRIBE ORDERS (OUTPATIENT)
Dept: ADMINISTRATIVE | Facility: HOSPITAL | Age: 75
End: 2020-06-19

## 2020-06-19 DIAGNOSIS — M17.9 OSTEOARTHRITIS OF KNEE, UNSPECIFIED LATERALITY, UNSPECIFIED OSTEOARTHRITIS TYPE: ICD-10-CM

## 2020-06-19 DIAGNOSIS — M17.9 OSTEOARTHRITIS OF KNEE, UNSPECIFIED LATERALITY, UNSPECIFIED OSTEOARTHRITIS TYPE: Primary | ICD-10-CM

## 2020-06-19 LAB
ABO GROUP BLD: NORMAL
ABO GROUP BLD: NORMAL
ANION GAP SERPL CALCULATED.3IONS-SCNC: 11.4 MMOL/L (ref 5–15)
BACTERIA UR QL AUTO: ABNORMAL /HPF
BASOPHILS # BLD AUTO: 0.05 10*3/MM3 (ref 0–0.2)
BASOPHILS NFR BLD AUTO: 0.4 % (ref 0–1.5)
BILIRUB UR QL STRIP: NEGATIVE
BLD GP AB SCN SERPL QL: NEGATIVE
BUN BLD-MCNC: 34 MG/DL (ref 8–23)
BUN/CREAT SERPL: 29.3 (ref 7–25)
CALCIUM SPEC-SCNC: 9.6 MG/DL (ref 8.6–10.5)
CHLORIDE SERPL-SCNC: 99 MMOL/L (ref 98–107)
CLARITY UR: CLEAR
CO2 SERPL-SCNC: 26.6 MMOL/L (ref 22–29)
COLOR UR: YELLOW
CREAT BLD-MCNC: 1.16 MG/DL (ref 0.76–1.27)
DEPRECATED RDW RBC AUTO: 41.8 FL (ref 37–54)
EOSINOPHIL # BLD AUTO: 0.37 10*3/MM3 (ref 0–0.4)
EOSINOPHIL NFR BLD AUTO: 2.7 % (ref 0.3–6.2)
ERYTHROCYTE [DISTWIDTH] IN BLOOD BY AUTOMATED COUNT: 13.3 % (ref 12.3–15.4)
GFR SERPL CREATININE-BSD FRML MDRD: 62 ML/MIN/1.73
GLUCOSE BLD-MCNC: 103 MG/DL (ref 65–99)
GLUCOSE UR STRIP-MCNC: NEGATIVE MG/DL
HCT VFR BLD AUTO: 41 % (ref 37.5–51)
HGB BLD-MCNC: 13.6 G/DL (ref 13–17.7)
HGB UR QL STRIP.AUTO: NEGATIVE
HYALINE CASTS UR QL AUTO: ABNORMAL /LPF
IMM GRANULOCYTES # BLD AUTO: 0.1 10*3/MM3 (ref 0–0.05)
IMM GRANULOCYTES NFR BLD AUTO: 0.7 % (ref 0–0.5)
KETONES UR QL STRIP: NEGATIVE
LEUKOCYTE ESTERASE UR QL STRIP.AUTO: ABNORMAL
LYMPHOCYTES # BLD AUTO: 2.33 10*3/MM3 (ref 0.7–3.1)
LYMPHOCYTES NFR BLD AUTO: 17.1 % (ref 19.6–45.3)
MCH RBC QN AUTO: 28.4 PG (ref 26.6–33)
MCHC RBC AUTO-ENTMCNC: 33.2 G/DL (ref 31.5–35.7)
MCV RBC AUTO: 85.6 FL (ref 79–97)
MONOCYTES # BLD AUTO: 1.08 10*3/MM3 (ref 0.1–0.9)
MONOCYTES NFR BLD AUTO: 7.9 % (ref 5–12)
MRSA DNA SPEC QL NAA+PROBE: NORMAL
NEUTROPHILS # BLD AUTO: 9.72 10*3/MM3 (ref 1.7–7)
NEUTROPHILS NFR BLD AUTO: 71.2 % (ref 42.7–76)
NITRITE UR QL STRIP: NEGATIVE
NRBC BLD AUTO-RTO: 0 /100 WBC (ref 0–0.2)
PH UR STRIP.AUTO: 6.5 [PH] (ref 5–8)
PLATELET # BLD AUTO: 273 10*3/MM3 (ref 140–450)
PMV BLD AUTO: 11.3 FL (ref 6–12)
POTASSIUM BLD-SCNC: 3.9 MMOL/L (ref 3.5–5.2)
PROT UR QL STRIP: NEGATIVE
RBC # BLD AUTO: 4.79 10*6/MM3 (ref 4.14–5.8)
RBC # UR: ABNORMAL /HPF
REF LAB TEST METHOD: ABNORMAL
RH BLD: POSITIVE
RH BLD: POSITIVE
SODIUM BLD-SCNC: 137 MMOL/L (ref 136–145)
SP GR UR STRIP: 1.02 (ref 1–1.03)
SQUAMOUS #/AREA URNS HPF: ABNORMAL /HPF
T&S EXPIRATION DATE: NORMAL
UROBILINOGEN UR QL STRIP: ABNORMAL
WBC NRBC COR # BLD: 13.65 10*3/MM3 (ref 3.4–10.8)
WBC UR QL AUTO: ABNORMAL /HPF

## 2020-06-19 PROCEDURE — 80048 BASIC METABOLIC PNL TOTAL CA: CPT

## 2020-06-19 PROCEDURE — 36415 COLL VENOUS BLD VENIPUNCTURE: CPT

## 2020-06-19 PROCEDURE — 81001 URINALYSIS AUTO W/SCOPE: CPT

## 2020-06-19 PROCEDURE — 86850 RBC ANTIBODY SCREEN: CPT

## 2020-06-19 PROCEDURE — 87641 MR-STAPH DNA AMP PROBE: CPT

## 2020-06-19 PROCEDURE — 85025 COMPLETE CBC W/AUTO DIFF WBC: CPT

## 2020-06-19 PROCEDURE — 86901 BLOOD TYPING SEROLOGIC RH(D): CPT

## 2020-06-19 PROCEDURE — 86900 BLOOD TYPING SEROLOGIC ABO: CPT

## 2020-06-19 PROCEDURE — 87086 URINE CULTURE/COLONY COUNT: CPT

## 2020-06-21 LAB — BACTERIA SPEC AEROBE CULT: NORMAL

## 2020-08-11 ENCOUNTER — HOSPITAL ENCOUNTER (OUTPATIENT)
Dept: GENERAL RADIOLOGY | Facility: HOSPITAL | Age: 75
Discharge: HOME | End: 2020-08-11
Admitting: ORTHOPAEDIC SURGERY

## 2020-08-11 DIAGNOSIS — Z47.1 AFTERCARE FOLLOWING JOINT REPLACEMENT: ICD-10-CM

## 2020-08-11 PROCEDURE — 73560 X-RAY EXAM OF KNEE 1 OR 2: CPT

## 2021-04-13 ENCOUNTER — HOSPITAL ENCOUNTER (OUTPATIENT)
Facility: HOSPITAL | Age: 76
Setting detail: OBSERVATION
Discharge: HOME | End: 2021-04-14
Attending: EMERGENCY MEDICINE | Admitting: INTERNAL MEDICINE

## 2021-04-13 ENCOUNTER — APPOINTMENT (OUTPATIENT)
Dept: CT IMAGING | Facility: HOSPITAL | Age: 76
End: 2021-04-13

## 2021-04-13 DIAGNOSIS — R42 DIZZINESS: Primary | ICD-10-CM

## 2021-04-13 PROBLEM — E66.813 OBESITY, CLASS III, BMI 40-49.9 (MORBID OBESITY): Status: ACTIVE | Noted: 2020-02-10

## 2021-04-13 PROBLEM — I10 ESSENTIAL HYPERTENSION: Chronic | Status: ACTIVE | Noted: 2018-07-03

## 2021-04-13 LAB
ANION GAP SERPL CALCULATED.3IONS-SCNC: 11 MMOL/L (ref 5–15)
BASOPHILS # BLD AUTO: 0.1 10*3/MM3 (ref 0–0.2)
BASOPHILS NFR BLD AUTO: 0.7 % (ref 0–1.5)
BUN SERPL-MCNC: 26 MG/DL (ref 8–23)
BUN/CREAT SERPL: 22.2 (ref 7–25)
CALCIUM SPEC-SCNC: 9.2 MG/DL (ref 8.6–10.5)
CHLORIDE SERPL-SCNC: 105 MMOL/L (ref 98–107)
CO2 SERPL-SCNC: 27 MMOL/L (ref 22–29)
CREAT SERPL-MCNC: 1.17 MG/DL (ref 0.76–1.27)
DEPRECATED RDW RBC AUTO: 44.2 FL (ref 37–54)
EOSINOPHIL # BLD AUTO: 0.3 10*3/MM3 (ref 0–0.4)
EOSINOPHIL NFR BLD AUTO: 1.9 % (ref 0.3–6.2)
ERYTHROCYTE [DISTWIDTH] IN BLOOD BY AUTOMATED COUNT: 14.9 % (ref 12.3–15.4)
GFR SERPL CREATININE-BSD FRML MDRD: 61 ML/MIN/1.73
GLUCOSE SERPL-MCNC: 114 MG/DL (ref 65–99)
HCT VFR BLD AUTO: 39 % (ref 37.5–51)
HGB BLD-MCNC: 13 G/DL (ref 13–17.7)
HOLD SPECIMEN: NORMAL
LYMPHOCYTES # BLD AUTO: 2.3 10*3/MM3 (ref 0.7–3.1)
LYMPHOCYTES NFR BLD AUTO: 16.5 % (ref 19.6–45.3)
MCH RBC QN AUTO: 28 PG (ref 26.6–33)
MCHC RBC AUTO-ENTMCNC: 33.5 G/DL (ref 31.5–35.7)
MCV RBC AUTO: 83.7 FL (ref 79–97)
MONOCYTES # BLD AUTO: 1 10*3/MM3 (ref 0.1–0.9)
MONOCYTES NFR BLD AUTO: 7.1 % (ref 5–12)
NEUTROPHILS NFR BLD AUTO: 10.2 10*3/MM3 (ref 1.7–7)
NEUTROPHILS NFR BLD AUTO: 73.8 % (ref 42.7–76)
NRBC BLD AUTO-RTO: 0.1 /100 WBC (ref 0–0.2)
PLATELET # BLD AUTO: 292 10*3/MM3 (ref 140–450)
PMV BLD AUTO: 8.3 FL (ref 6–12)
POTASSIUM SERPL-SCNC: 3.5 MMOL/L (ref 3.5–5.2)
RBC # BLD AUTO: 4.66 10*6/MM3 (ref 4.14–5.8)
SODIUM SERPL-SCNC: 143 MMOL/L (ref 136–145)
TROPONIN T SERPL-MCNC: <0.01 NG/ML (ref 0–0.03)
WBC # BLD AUTO: 13.8 10*3/MM3 (ref 3.4–10.8)

## 2021-04-13 PROCEDURE — G0378 HOSPITAL OBSERVATION PER HR: HCPCS

## 2021-04-13 PROCEDURE — 99284 EMERGENCY DEPT VISIT MOD MDM: CPT

## 2021-04-13 PROCEDURE — 93005 ELECTROCARDIOGRAM TRACING: CPT | Performed by: EMERGENCY MEDICINE

## 2021-04-13 PROCEDURE — 96372 THER/PROPH/DIAG INJ SC/IM: CPT

## 2021-04-13 PROCEDURE — 84484 ASSAY OF TROPONIN QUANT: CPT | Performed by: EMERGENCY MEDICINE

## 2021-04-13 PROCEDURE — U0005 INFEC AGEN DETEC AMPLI PROBE: HCPCS | Performed by: INTERNAL MEDICINE

## 2021-04-13 PROCEDURE — C9803 HOPD COVID-19 SPEC COLLECT: HCPCS

## 2021-04-13 PROCEDURE — 99220 PR INITIAL OBSERVATION CARE/DAY 70 MINUTES: CPT | Performed by: INTERNAL MEDICINE

## 2021-04-13 PROCEDURE — 25010000002 HEPARIN (PORCINE) PER 1000 UNITS: Performed by: INTERNAL MEDICINE

## 2021-04-13 PROCEDURE — 85025 COMPLETE CBC W/AUTO DIFF WBC: CPT | Performed by: EMERGENCY MEDICINE

## 2021-04-13 PROCEDURE — 82746 ASSAY OF FOLIC ACID SERUM: CPT | Performed by: INTERNAL MEDICINE

## 2021-04-13 PROCEDURE — 96374 THER/PROPH/DIAG INJ IV PUSH: CPT

## 2021-04-13 PROCEDURE — U0003 INFECTIOUS AGENT DETECTION BY NUCLEIC ACID (DNA OR RNA); SEVERE ACUTE RESPIRATORY SYNDROME CORONAVIRUS 2 (SARS-COV-2) (CORONAVIRUS DISEASE [COVID-19]), AMPLIFIED PROBE TECHNIQUE, MAKING USE OF HIGH THROUGHPUT TECHNOLOGIES AS DESCRIBED BY CMS-2020-01-R: HCPCS | Performed by: INTERNAL MEDICINE

## 2021-04-13 PROCEDURE — 82607 VITAMIN B-12: CPT | Performed by: INTERNAL MEDICINE

## 2021-04-13 PROCEDURE — 80048 BASIC METABOLIC PNL TOTAL CA: CPT | Performed by: EMERGENCY MEDICINE

## 2021-04-13 PROCEDURE — 25010000002 LORAZEPAM PER 2 MG: Performed by: EMERGENCY MEDICINE

## 2021-04-13 PROCEDURE — 70450 CT HEAD/BRAIN W/O DYE: CPT

## 2021-04-13 RX ORDER — POTASSIUM CHLORIDE 750 MG/1
10 TABLET, EXTENDED RELEASE ORAL DAILY
COMMUNITY
End: 2021-04-14 | Stop reason: HOSPADM

## 2021-04-13 RX ORDER — ACETAMINOPHEN 160 MG/5ML
325 SOLUTION ORAL EVERY 4 HOURS PRN
Status: DISCONTINUED | OUTPATIENT
Start: 2021-04-13 | End: 2021-04-14 | Stop reason: HOSPADM

## 2021-04-13 RX ORDER — SODIUM CHLORIDE 0.9 % (FLUSH) 0.9 %
10 SYRINGE (ML) INJECTION AS NEEDED
Status: DISCONTINUED | OUTPATIENT
Start: 2021-04-13 | End: 2021-04-14 | Stop reason: HOSPADM

## 2021-04-13 RX ORDER — SODIUM CHLORIDE 0.9 % (FLUSH) 0.9 %
10 SYRINGE (ML) INJECTION EVERY 12 HOURS SCHEDULED
Status: DISCONTINUED | OUTPATIENT
Start: 2021-04-13 | End: 2021-04-14 | Stop reason: HOSPADM

## 2021-04-13 RX ORDER — LISINOPRIL 20 MG/1
40 TABLET ORAL DAILY
Status: DISCONTINUED | OUTPATIENT
Start: 2021-04-14 | End: 2021-04-14 | Stop reason: HOSPADM

## 2021-04-13 RX ORDER — ATORVASTATIN CALCIUM 10 MG/1
10 TABLET, FILM COATED ORAL NIGHTLY
Status: DISCONTINUED | OUTPATIENT
Start: 2021-04-13 | End: 2021-04-14 | Stop reason: HOSPADM

## 2021-04-13 RX ORDER — ASPIRIN 81 MG/1
81 TABLET ORAL NIGHTLY
Status: DISCONTINUED | OUTPATIENT
Start: 2021-04-13 | End: 2021-04-14 | Stop reason: HOSPADM

## 2021-04-13 RX ORDER — HEPARIN SODIUM 5000 [USP'U]/ML
5000 INJECTION, SOLUTION INTRAVENOUS; SUBCUTANEOUS EVERY 12 HOURS SCHEDULED
Status: DISCONTINUED | OUTPATIENT
Start: 2021-04-13 | End: 2021-04-14 | Stop reason: HOSPADM

## 2021-04-13 RX ORDER — NAPROXEN 500 MG/1
500 TABLET ORAL 2 TIMES DAILY PRN
COMMUNITY
End: 2021-04-14 | Stop reason: HOSPADM

## 2021-04-13 RX ORDER — ACETAMINOPHEN 325 MG/1
325 TABLET ORAL EVERY 4 HOURS PRN
Status: DISCONTINUED | OUTPATIENT
Start: 2021-04-13 | End: 2021-04-14 | Stop reason: HOSPADM

## 2021-04-13 RX ORDER — FUROSEMIDE 20 MG/1
20 TABLET ORAL DAILY
COMMUNITY
End: 2021-04-14 | Stop reason: HOSPADM

## 2021-04-13 RX ORDER — ACETAMINOPHEN 650 MG/1
650 SUPPOSITORY RECTAL EVERY 4 HOURS PRN
Status: DISCONTINUED | OUTPATIENT
Start: 2021-04-13 | End: 2021-04-14 | Stop reason: HOSPADM

## 2021-04-13 RX ORDER — MECLIZINE HYDROCHLORIDE 25 MG/1
25 TABLET ORAL ONCE
Status: COMPLETED | OUTPATIENT
Start: 2021-04-13 | End: 2021-04-13

## 2021-04-13 RX ORDER — NIFEDIPINE 60 MG/1
60 TABLET, EXTENDED RELEASE ORAL DAILY
Status: DISCONTINUED | OUTPATIENT
Start: 2021-04-14 | End: 2021-04-14 | Stop reason: HOSPADM

## 2021-04-13 RX ORDER — ATENOLOL 50 MG/1
50 TABLET ORAL DAILY
Status: DISCONTINUED | OUTPATIENT
Start: 2021-04-13 | End: 2021-04-14 | Stop reason: HOSPADM

## 2021-04-13 RX ORDER — LORAZEPAM 2 MG/ML
0.5 INJECTION INTRAMUSCULAR ONCE
Status: COMPLETED | OUTPATIENT
Start: 2021-04-13 | End: 2021-04-13

## 2021-04-13 RX ADMIN — HEPARIN SODIUM 5000 UNITS: 5000 INJECTION INTRAVENOUS; SUBCUTANEOUS at 20:03

## 2021-04-13 RX ADMIN — MECLIZINE HYDROCHLORIDE 25 MG: 25 TABLET ORAL at 17:42

## 2021-04-13 RX ADMIN — Medication 10 ML: at 20:02

## 2021-04-13 RX ADMIN — ATENOLOL 50 MG: 50 TABLET ORAL at 20:02

## 2021-04-13 RX ADMIN — ATORVASTATIN CALCIUM 10 MG: 10 TABLET, FILM COATED ORAL at 20:02

## 2021-04-13 RX ADMIN — ASPIRIN 81 MG: 81 TABLET, COATED ORAL at 20:02

## 2021-04-13 RX ADMIN — LORAZEPAM 0.5 MG: 2 INJECTION INTRAMUSCULAR; INTRAVENOUS at 17:42

## 2021-04-13 NOTE — H&P
[facility] HOSPITALIST     Yun Ruff MD    111/1  Patient Care Team:  Yun Ruff MD as PCP - General (Internal Medicine)  Joshua Tinsley MD as Consulting Physician (Cardiology)        CHIEF COMPLAINT:     Chief Complaint   Patient presents with   • Dizziness       HISTORY OF PRESENT ILLNESS:       Patient is a 75-year-old male who states he developed a vague dizziness over the past several hours.  He states his dizziness that he cannot ambulate or stand.  States the dizziness is worse on sitting and standing.  He denies headache neck pain cough fever chest pain shortness of breath or other complaint  Patient unable to ambulate due to dizziness.  Per ER MD patient was leaning on one side.      PCP  Yun Ruff MD (General)     Past Medical History:   Diagnosis Date   • Anemia    • CAD (coronary artery disease)    • Chest pain    • Diastolic dysfunction     Stage I   • Dizziness and giddiness    • Dyspnea    • Hyperlipidemia    • Hypertension    • Left ventricular hypertrophy     Mild concentric   • Mitral regurgitation     Trace   • Morbid obesity with BMI of 40.0-44.9, adult (CMS/[facility])    • RAFAEL on CPAP     CPAP OFF/ON   • Pes anserine bursitis    • Prostate cancer (CMS/[facility])    • Rotator cuff tendinitis    • Sinusitis    • TIA (transient ischemic attack) 2009   • Tricuspid regurgitation      Past Surgical History:   Procedure Laterality Date   • CARDIAC CATHETERIZATION N/A 12/18/2018    Procedure: Left Heart Cath;  Surgeon: Paxton Grant MD;  Location: [facility] INVASIVE LOCATION;  Service: Cardiovascular   • CARDIAC CATHETERIZATION N/A 12/18/2018    Procedure: Coronary angiography;  Surgeon: Paxton Grant MD;  Location: [facility] INVASIVE LOCATION;  Service: Cardiovascular   • CARDIAC CATHETERIZATION N/A 12/18/2018    Procedure: Left ventriculography;  Surgeon: Paxton Grant MD;  Location: [facility] INVASIVE LOCATION;   Service: Cardiovascular   • COLONOSCOPY N/A 2016    Procedure: COLONOSCOPY with polypectomy (cold biopsy);  Surgeon: Alex Gallego MD;  Location: [facility] ENDOSCOPY;  Service:    • EYE SURGERY      CATARACTS   • KNEE SURGERY Left    • PROSTATE SURGERY  2014   • SHOULDER SURGERY Left    • URINARY SPHINCTER IMPLANT N/A 2020    Procedure: INSERTION OF ARTIFICIAL URINARY SPHINCTER;  Surgeon: Paxton Rosado MD;  Location: [facility] MAIN OR;  Service: Urology;  Laterality: N/A;     Family History   Problem Relation Age of Onset   • Heart disease Mother    • Hypertension Mother    • Heart disease Father    • Hypertension Father    • Diabetes Sister    • Hypertension Sister    • Diabetes Brother    • Heart disease Brother    • Hypertension Brother    • Heart disease Brother    • Hypertension Brother    • Diabetes Brother    • Hypertension Brother    • Malig Hyperthermia Neg Hx      Social History     Tobacco Use   • Smoking status: Former Smoker     Types: Cigarettes     Quit date:      Years since quittin.2   • Smokeless tobacco: Never Used   • Tobacco comment: Caffeine use -6 drinks per day   Vaping Use   • Vaping Use: Never used   Substance Use Topics   • Alcohol use: Yes     Comment: social   • Drug use: No     Medications Prior to Admission   Medication Sig Dispense Refill Last Dose   • ASPIR-LOW 81 MG EC tablet Take 81 mg by mouth Every Night.   2021 at Unknown time   • atenolol (TENORMIN) 50 MG tablet Take 50 mg by mouth Daily.   2021 at Unknown time   • furosemide (LASIX) 20 MG tablet Take 20 mg by mouth Daily.   2021 at 0800   • lisinopril (PRINIVIL,ZESTRIL) 40 MG tablet Take 40 mg by mouth Daily.   2021 at 0800   • naproxen (NAPROSYN) 500 MG tablet Take 500 mg by mouth 2 (Two) Times a Day As Needed for Mild Pain .   2021 at 0800   • NIFEdipine CC (ADALAT CC) 60 MG 24 hr tablet Take 60 mg by mouth Daily.   2021 at 0800   • potassium chloride  "(K-DUR,KLOR-CON) 10 MEQ CR tablet Take 10 mEq by mouth Daily.   4/13/2021 at 0800   • pravastatin (PRAVACHOL) 40 MG tablet Take 40 mg by mouth Every Night.   4/12/2021 at Unknown time     Allergies:  Patient has no known allergies.      There is no immunization history on file for this patient.        REVIEW OF SYSTEMS:     Review of Systems   Constitutional: Negative.   HENT: Negative.    Eyes: Negative.    Cardiovascular: Negative.    Respiratory: Negative.    Endocrine: Negative.    Hematologic/Lymphatic: Negative.    Skin: Negative.    Musculoskeletal: Negative.    Gastrointestinal: Negative.    Genitourinary: Negative.    Neurological: Positive for dizziness and loss of balance.   Psychiatric/Behavioral: Negative.    Allergic/Immunologic: Negative.    All other systems reviewed and are negative.            Vital Signs  Temp:  [97.6 °F (36.4 °C)-99.1 °F (37.3 °C)] 97.6 °F (36.4 °C)  Heart Rate:  [77-81] 81  Resp:  [18-20] 18  BP: (125-166)/(71-89) 148/86    Flowsheet Rows      First Filed Value   Admission Height  177.8 cm (70\") Documented at 04/13/2021 1616   Admission Weight  136 kg (300 lb) Documented at 04/13/2021 1616           Physical Exam:    Physical Exam  Vitals and nursing note reviewed.   Constitutional:       General: He is not in acute distress.     Appearance: He is well-developed. He is obese. He is not ill-appearing, toxic-appearing or diaphoretic.   HENT:      Head: Normocephalic and atraumatic.      Right Ear: Ear canal and external ear normal.      Left Ear: Ear canal and external ear normal.      Nose: Nose normal. No congestion or rhinorrhea.      Mouth/Throat:      Mouth: Mucous membranes are moist.      Pharynx: No oropharyngeal exudate.   Eyes:      General: No scleral icterus.        Right eye: No discharge.         Left eye: No discharge.      Extraocular Movements: Extraocular movements intact.      Conjunctiva/sclera: Conjunctivae normal.      Pupils: Pupils are equal, round, and " reactive to light.   Neck:      Thyroid: No thyromegaly.      Vascular: No carotid bruit or JVD.      Trachea: No tracheal deviation.   Cardiovascular:      Rate and Rhythm: Normal rate and regular rhythm.      Pulses: Normal pulses.      Heart sounds: Normal heart sounds. No murmur heard.   No friction rub. No gallop.       Comments: Nonpitting edema legs  Pulmonary:      Effort: Pulmonary effort is normal. No respiratory distress.      Breath sounds: Normal breath sounds. No stridor. No wheezing, rhonchi or rales.   Chest:      Chest wall: No tenderness.   Abdominal:      General: Bowel sounds are normal. There is no distension.      Palpations: Abdomen is soft. There is no mass.      Tenderness: There is no abdominal tenderness. There is no guarding or rebound.      Hernia: No hernia is present.   Musculoskeletal:         General: No swelling, tenderness, deformity or signs of injury. Normal range of motion.      Cervical back: Normal range of motion and neck supple. No rigidity. No muscular tenderness.      Right lower leg: No edema.      Left lower leg: No edema.   Lymphadenopathy:      Cervical: No cervical adenopathy.   Skin:     General: Skin is warm and dry.      Coloration: Skin is not jaundiced or pale.      Findings: No bruising, erythema or rash.   Neurological:      General: No focal deficit present.      Mental Status: He is alert and oriented to person, place, and time. Mental status is at baseline.      Cranial Nerves: No cranial nerve deficit.      Sensory: No sensory deficit.      Motor: No weakness or abnormal muscle tone.      Coordination: Coordination normal.      Comments: No adysdiadochokinesis  Gait not examined   Psychiatric:         Mood and Affect: Mood normal.         Behavior: Behavior normal.         Thought Content: Thought content normal.         Judgment: Judgment normal.               Lab Results (most recent)     Procedure Component Value Units Date/Time    Extra Tubes  [[id number]] Collected: 04/13/21 1631    Specimen: Blood, Venous Line Updated: 04/13/21 1745    Narrative:      The following orders were created for panel order Extra Tubes.  Procedure                               Abnormality         Status                     ---------                               -----------         ------                     Gold Top - SST[[id number]]                                   Final result                 Please view results for these tests on the individual orders.    [facility] - SST [[id number]] Collected: 04/13/21 1631    Specimen: Blood Updated: 04/13/21 1745     Extra Tube Hold for add-ons.     Comment: Auto resulted.       Troponin [[id number]]  (Normal) Collected: 04/13/21 1631    Specimen: Blood Updated: 04/13/21 1707     Troponin T <0.010 ng/mL     Narrative:      Troponin T Reference Range:  <= 0.03 ng/mL-   Negative for AMI  >0.03 ng/mL-     Abnormal for myocardial necrosis.  Clinicians would have to utilize clinical acumen, EKG, Troponin and serial changes to determine if it is an Acute Myocardial Infarction or myocardial injury due to an underlying chronic condition.       Results may be falsely decreased if patient taking Biotin.      Basic Metabolic Panel [[id number]]  (Abnormal) Collected: 04/13/21 1631    Specimen: Blood Updated: 04/13/21 1705     Glucose 114 mg/dL      BUN 26 mg/dL      Creatinine 1.17 mg/dL      Sodium 143 mmol/L      Potassium 3.5 mmol/L      Chloride 105 mmol/L      CO2 27.0 mmol/L      Calcium 9.2 mg/dL      eGFR Non African Amer 61 mL/min/1.73      BUN/Creatinine Ratio 22.2     Anion Gap 11.0 mmol/L     Narrative:      GFR Normal >60  Chronic Kidney Disease <60  Kidney Failure <15      CBC & Differential [[id number]]  (Abnormal) Collected: 04/13/21 1631    Specimen: Blood Updated: 04/13/21 1637    Narrative:      The following orders were created for panel order CBC & Differential.  Procedure                               Abnormality         Status                      ---------                               -----------         ------                     CBC Auto Differential[[id number]]        Abnormal            Final result                 Please view results for these tests on the individual orders.    CBC Auto Differential [[id number]]  (Abnormal) Collected: 04/13/21 1631    Specimen: Blood Updated: 04/13/21 1637     WBC 13.80 10*3/mm3      RBC 4.66 10*6/mm3      Hemoglobin 13.0 g/dL      Hematocrit 39.0 %      MCV 83.7 fL      MCH 28.0 pg      MCHC 33.5 g/dL      RDW 14.9 %      RDW-SD 44.2 fl      MPV 8.3 fL      Platelets 292 10*3/mm3      Neutrophil % 73.8 %      Lymphocyte % 16.5 %      Monocyte % 7.1 %      Eosinophil % 1.9 %      Basophil % 0.7 %      Neutrophils, Absolute 10.20 10*3/mm3      Lymphocytes, Absolute 2.30 10*3/mm3      Monocytes, Absolute 1.00 10*3/mm3      Eosinophils, Absolute 0.30 10*3/mm3      Basophils, Absolute 0.10 10*3/mm3      nRBC 0.1 /100 WBC          Results from last 7 days   Lab Units 04/13/21  1631   WBC 10*3/mm3 13.80*   HEMOGLOBIN g/dL 13.0   HEMATOCRIT % 39.0   MCV fL 83.7   MCH pg 28.0   PLATELETS 10*3/mm3 292     Results from last 7 days   Lab Units 04/13/21  1631   SODIUM mmol/L 143   POTASSIUM mmol/L 3.5   CHLORIDE mmol/L 105   CO2 mmol/L 27.0   BUN mg/dL 26*   CREATININE mg/dL 1.17   CALCIUM mg/dL 9.2   GLUCOSE mg/dL 114*     Lab Results   Component Value Date    CALCIUM 9.2 04/13/2021     No results found for: HGBA1C  No results found for: CHOL, CHLPL, TRIG, HDL, LDL, LDLDIRECT  No results found for: LIPASE      Pathology  Lab Results   Lab Value Date/Time    FINALDX  12/05/2016 1320     1: CECUM, BIOPSIES:   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    2: RIGHT/ASCENDING COLON, BIOPSIES:   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   BENIGN APPEARING LYMPHOID AGGREGATE.    3: RECTUM, BIOPSY:   FRAGMENTS OF HYPERPLASTIC POLYP.    NICOLE/yesi     CPT CODES:  1: 60596  2: 30755  3: 84068        Inflammatory Biomarkers        Invalid input(s): ESR, D-DIMER QUANTITATIVE,  PROCALCITONIN  No results found for: COVID19     Microbiology Results (last 10 days)     ** No results found for the last 240 hours. **          ECG/EMG Results (most recent)     Procedure Component Value Units Date/Time    ECG 12 Lead [[id number]] Collected: 04/13/21 1634     Updated: 04/13/21 1636     QT Interval 393 ms     Narrative:      HEART RATE= 81  bpm  RR Interval= 740  ms  AR Interval= 178  ms  P Horizontal Axis= 6  deg  P Front Axis= -58  deg  QRSD Interval= 97  ms  QT Interval= 393  ms  QRS Axis= -42  deg  T Wave Axis= 18  deg  - BORDERLINE ECG -  Ectopic atrial rhythm  Left axis deviation  When compared with ECG of 13-Feb-2020 7:55:18,  Significant change in rhythm  Significant axis, voltage or hypertrophy change  Electronically Signed By:   Date and Time of Study: 2021-04-13 16:34:24          Results for orders placed during the hospital encounter of 03/13/20    Doppler Arterial Multi Level Lower Extremity - Bilateral CAR    Interpretation Summary  · Right Conclusion: The right FERMIN is normal.  · Left Conclusion: The left FERMIN is normal.      Results for orders placed in visit on 02/27/18    SCANNED - ECHOCARDIOGRAM      CT Head Without Contrast    Result Date: 4/13/2021   1. No acute intracranial findings. 2. Mild atrophy and mild chronic microvascular disease.  Electronically Signed By-Christina Arenas MD On:4/13/2021 5:00 PM This report was finalized on [id number] by  Christina Arenas MD.      Results Review:    I reviewed the patient's new clinical results.    Assessment/Plan     Active Hospital Problems    Diagnosis  POA   • **Dizziness [R42]  Yes     Priority: High   • Diastolic dysfunction [I51.89]  Yes   • Stage 3a chronic kidney disease (CMS/HCC) [N18.31]  Yes   • Obesity, Class III, BMI 40-49.9 (morbid obesity) (CMS/HCC) [E66.01]  Yes   • CAD (coronary artery disease) [I25.10]  Yes   • Essential hypertension [I10]   Yes   • History of malignant neoplasm of prostate [Z85.46]  Not Applicable   • Mixed hyperlipidemia [E78.2]  Yes      Resolved Hospital Problems   No resolved problems to display.        MEDICAL DECISION MAKING COMPLEXITY BY PROBLEM:     Dizziness  Stroke work-up  Await MRI and other work-up as ordered    Diastolic dysfunction  Continue beta-blockers and ACE inhibitors    CKD  Suspect secondary to NSAIDs and diuretic.  Discontinue both and assess renal function      Hypertension:  Continue home medications   Options include-  beta-blockers  Calcium channel blockers  ACE inhibitor  Vasodilators  Low-dose diuretics  PRN medications have not been shown to affect outcomes-to be avoided        Hyperlipidemia:  Check lipid panel  Statins if indicated  Add Ezetimibe if appropriate  Only Icosapent Ethyl (omega-3) demonstrated efficacy in Hypertriglyceridemia.    Coronary artery disease:  Lipid-lowering agents  Beta blocker  ACE inhibitor if tolerated  Antithrombotics  -According to the new evidence accumulating, after the first month after PCI patient can be on P2 Y 12 inhibitor and anticoagulant.  There is no need of additional aspirin.  Control hypertension  Control modifiable risk factors such as smoking  Weight loss if appropriate            Care coordination with emergency room and nursing for current care 04/13/21 7:32 PM EDT    Estimated Creatinine Clearance: 81.9 mL/min (by C-G formula based on SCr of 1.17 mg/dL).    Code Status and Medical Interventions:   Ordered at: 04/13/21 1931     Code Status:    CPR     Medical Interventions (Level of Support Prior to Arrest):    Full       DVT prophylaxis  Mechanical Order History:     None      Pharmalogical Order History:      Ordered     Dose Route Frequency Stop    04/13/21 1931  heparin (porcine) 5000 UNIT/ML injection 5,000 Units      5,000 Units SC Every 12 Hours Scheduled --                I personally reviewed patient's x-ray films and my findings are: 0    I  personally reviewed patient's EKG strip and my findings are: 0        Disposition        Continued Care and Services - Admitted Since 4/13/2021    Coordination has not been started for this encounter.         Reagan Luo MD  04/13/21  19:32 EDT

## 2021-04-13 NOTE — PLAN OF CARE
Problem: Adult Inpatient Plan of Care  Goal: Plan of Care Review  4/13/2021 1825 by Sally Marcum RN  Outcome: Ongoing, Progressing  Flowsheets (Taken 4/13/2021 1825)  Progress: improving  Plan of Care Reviewed With: patient  Outcome Summary: New admit from ED. Pt. states he was at [facility] and developed dizziness, drove home then called EMS.  4/13/2021 1825 by Sally Marcum RN  Outcome: Ongoing, Progressing  4/13/2021 1825 by Sally Marcum RN  Outcome: Ongoing, Progressing  Flowsheets (Taken 4/13/2021 1825)  Progress: improving  Plan of Care Reviewed With: patient  Outcome Summary: New admit from ED. Pt. states he was at [facility] and developed dizziness, drove home then called EMS.   Goal Outcome Evaluation:

## 2021-04-13 NOTE — ED PROVIDER NOTES
Subjective   Patient is a 75-year-old male who states he developed a vague dizziness over the past several hours.  He states his dizziness that he cannot ambulate or stand.  States the dizziness is worse on sitting and standing.  He denies headache neck pain cough fever chest pain shortness of breath or other complaint          Review of Systems  Negative for headache earache sore throat cough fever chest pain shortness of breath abdominal pain vomiting diarrhea dysuria achiness weight loss or other complaint.  A complete review systems was obtained and is otherwise negative  Past Medical History:   Diagnosis Date   • Anemia    • CAD (coronary artery disease)    • Chest pain    • Diastolic dysfunction     Stage I   • Dizziness and giddiness    • Dyspnea    • Hyperlipidemia    • Hypertension    • Left ventricular hypertrophy     Mild concentric   • Mitral regurgitation     Trace   • Morbid obesity with BMI of 40.0-44.9, adult (CMS/[facility])    • RAFAEL on CPAP     CPAP OFF/ON   • Pes anserine bursitis    • Prostate cancer (CMS/[facility])    • Rotator cuff tendinitis    • Sinusitis    • TIA (transient ischemic attack) 2009   • Tricuspid regurgitation        No Known Allergies    Past Surgical History:   Procedure Laterality Date   • CARDIAC CATHETERIZATION N/A 12/18/2018    Procedure: Left Heart Cath;  Surgeon: Paxton Grant MD;  Location: [facility] CATH INVASIVE LOCATION;  Service: Cardiovascular   • CARDIAC CATHETERIZATION N/A 12/18/2018    Procedure: Coronary angiography;  Surgeon: Paxton Grant MD;  Location: [facility] CATH INVASIVE LOCATION;  Service: Cardiovascular   • CARDIAC CATHETERIZATION N/A 12/18/2018    Procedure: Left ventriculography;  Surgeon: Paxton Grant MD;  Location: [facility] CATH INVASIVE LOCATION;  Service: Cardiovascular   • COLONOSCOPY N/A 12/5/2016    Procedure: COLONOSCOPY with polypectomy (cold biopsy);  Surgeon: Alex Gallego MD;  Location: [facility] ENDOSCOPY;  Service:    • EYE  SURGERY      CATARACTS   • KNEE SURGERY Left 1966   • PROSTATE SURGERY  2014   • SHOULDER SURGERY Left 2013   • URINARY SPHINCTER IMPLANT N/A 2020    Procedure: INSERTION OF ARTIFICIAL URINARY SPHINCTER;  Surgeon: Paxton Rosado MD;  Location: [facility] OR;  Service: Urology;  Laterality: N/A;       Family History   Problem Relation Age of Onset   • Heart disease Mother    • Hypertension Mother    • Heart disease Father    • Hypertension Father    • Diabetes Sister    • Hypertension Sister    • Diabetes Brother    • Heart disease Brother    • Hypertension Brother    • Heart disease Brother    • Hypertension Brother    • Diabetes Brother    • Hypertension Brother    • Malig Hyperthermia Neg Hx        Social History     Socioeconomic History   • Marital status:      Spouse name: Not on file   • Number of children: Not on file   • Years of education: Not on file   • Highest education level: Not on file   Tobacco Use   • Smoking status: Former Smoker     Types: Cigarettes     Quit date:      Years since quittin.2   • Smokeless tobacco: Never Used   • Tobacco comment: Caffeine use -6 drinks per day   Substance and Sexual Activity   • Alcohol use: Yes     Comment: social   • Drug use: No   • Sexual activity: Defer           Objective   Physical Exam  Neurologic exam is nonfocal.  Patient is vertiginous on sitting and standing.  HEENT exam shows TMs to be clear.  Oropharynx is clear moist.  Sclerae nonicteric.  Neck has no adenopathy JVD or bruits.  Lungs are clear.  Heart has a regular rate rhythm without murmur rub or gallop.  Chest is nontender.  Abdomen is soft nontender.  Extremity exam is no cyanosis or edema.  Procedures     My EKG interpretation shows normal sinus rhythm with no acute ST change      ED Course      Results for orders placed or performed during the hospital encounter of 21   Basic Metabolic Panel    Specimen: Blood   Result Value Ref Range    Glucose 114 (H) 65 -  99 mg/dL    BUN 26 (H) 8 - 23 mg/dL    Creatinine 1.17 0.76 - 1.27 mg/dL    Sodium 143 136 - 145 mmol/L    Potassium 3.5 3.5 - 5.2 mmol/L    Chloride 105 98 - 107 mmol/L    CO2 27.0 22.0 - 29.0 mmol/L    Calcium 9.2 8.6 - 10.5 mg/dL    eGFR Non African Amer 61 >60 mL/min/1.73    BUN/Creatinine Ratio 22.2 7.0 - 25.0    Anion Gap 11.0 5.0 - 15.0 mmol/L   Troponin    Specimen: Blood   Result Value Ref Range    Troponin T <0.010 0.000 - 0.030 ng/mL   CBC Auto Differential    Specimen: Blood   Result Value Ref Range    WBC 13.80 (H) 3.40 - 10.80 10*3/mm3    RBC 4.66 4.14 - 5.80 10*6/mm3    Hemoglobin 13.0 13.0 - 17.7 g/dL    Hematocrit 39.0 37.5 - 51.0 %    MCV 83.7 79.0 - 97.0 fL    MCH 28.0 26.6 - 33.0 pg    MCHC 33.5 31.5 - 35.7 g/dL    RDW 14.9 12.3 - 15.4 %    RDW-SD 44.2 37.0 - 54.0 fl    MPV 8.3 6.0 - 12.0 fL    Platelets 292 140 - 450 10*3/mm3    Neutrophil % 73.8 42.7 - 76.0 %    Lymphocyte % 16.5 (L) 19.6 - 45.3 %    Monocyte % 7.1 5.0 - 12.0 %    Eosinophil % 1.9 0.3 - 6.2 %    Basophil % 0.7 0.0 - 1.5 %    Neutrophils, Absolute 10.20 (H) 1.70 - 7.00 10*3/mm3    Lymphocytes, Absolute 2.30 0.70 - 3.10 10*3/mm3    Monocytes, Absolute 1.00 (H) 0.10 - 0.90 10*3/mm3    Eosinophils, Absolute 0.30 0.00 - 0.40 10*3/mm3    Basophils, Absolute 0.10 0.00 - 0.20 10*3/mm3    nRBC 0.1 0.0 - 0.2 /100 WBC   ECG 12 Lead   Result Value Ref Range    QT Interval 393 ms     CT Head Without Contrast    Result Date: 4/13/2021   1. No acute intracranial findings. 2. Mild atrophy and mild chronic microvascular disease.  Electronically Signed By-Christina Arenas MD On:4/13/2021 5:00 PM This report was finalized on [id number] by  Christina Arenas MD.                                         MDM  Number of Diagnoses or Management Options  Diagnosis management comments: Patient has a benign physical exam.  He has no focal neurologic deficits.  CT scan of his head without contrast was normal.  Metabolic panel is at baseline.  There is no  evidence of acute infectious process.  Attempts were made for the patient to stand and ambulate however he was vertiginous enough that he could not bear his weight without falling.  Patient will be brought in the hospital for further neurologic evaluation.  I did speak to the on-call hospitalist       Amount and/or Complexity of Data Reviewed  Clinical lab tests: reviewed  Tests in the radiology section of CPT®: reviewed  Tests in the medicine section of CPT®: reviewed    Risk of Complications, Morbidity, and/or Mortality  Presenting problems: high  Diagnostic procedures: high  Management options: high    Patient Progress  Patient progress: stable      Final diagnoses:   Dizziness       ED Disposition  ED Disposition     ED Disposition Condition Comment    Decision to Admit            No follow-up provider specified.       Medication List      No changes were made to your prescriptions during this visit.          Inder Dotson MD  04/13/21 0490

## 2021-04-14 ENCOUNTER — APPOINTMENT (OUTPATIENT)
Dept: CARDIOLOGY | Facility: HOSPITAL | Age: 76
End: 2021-04-14

## 2021-04-14 ENCOUNTER — APPOINTMENT (OUTPATIENT)
Dept: MRI IMAGING | Facility: HOSPITAL | Age: 76
End: 2021-04-14

## 2021-04-14 VITALS
HEART RATE: 54 BPM | HEIGHT: 70 IN | WEIGHT: 315 LBS | OXYGEN SATURATION: 92 % | RESPIRATION RATE: 18 BRPM | SYSTOLIC BLOOD PRESSURE: 128 MMHG | BODY MASS INDEX: 45.1 KG/M2 | DIASTOLIC BLOOD PRESSURE: 73 MMHG | TEMPERATURE: 97.4 F

## 2021-04-14 PROBLEM — E04.1 THYROID NODULE: Chronic | Status: ACTIVE | Noted: 2021-04-14

## 2021-04-14 LAB
ANION GAP SERPL CALCULATED.3IONS-SCNC: 11 MMOL/L (ref 5–15)
BASOPHILS # BLD AUTO: 0.1 10*3/MM3 (ref 0–0.2)
BASOPHILS NFR BLD AUTO: 0.5 % (ref 0–1.5)
BH CV ECHO MEAS - ACS: 2.4 CM
BH CV ECHO MEAS - AO MAX PG (FULL): 1.6 MMHG
BH CV ECHO MEAS - AO MAX PG: 5.2 MMHG
BH CV ECHO MEAS - AO MEAN PG (FULL): 1.9 MMHG
BH CV ECHO MEAS - AO MEAN PG: 4 MMHG
BH CV ECHO MEAS - AO ROOT AREA (BSA CORRECTED): 1.4
BH CV ECHO MEAS - AO ROOT AREA: 9.5 CM^2
BH CV ECHO MEAS - AO ROOT DIAM: 3.5 CM
BH CV ECHO MEAS - AO V2 MAX: 114.4 CM/SEC
BH CV ECHO MEAS - AO V2 MEAN: 99.4 CM/SEC
BH CV ECHO MEAS - AO V2 VTI: 32.1 CM
BH CV ECHO MEAS - ASC AORTA: 3.6 CM
BH CV ECHO MEAS - AVA(I,A): 4.7 CM^2
BH CV ECHO MEAS - AVA(I,D): 4.7 CM^2
BH CV ECHO MEAS - AVA(V,A): 4.1 CM^2
BH CV ECHO MEAS - AVA(V,D): 4.1 CM^2
BH CV ECHO MEAS - BSA(HAYCOCK): 2.8 M^2
BH CV ECHO MEAS - BSA: 2.6 M^2
BH CV ECHO MEAS - BZI_BMI: 46.6 KILOGRAMS/M^2
BH CV ECHO MEAS - BZI_METRIC_HEIGHT: 177.8 CM
BH CV ECHO MEAS - BZI_METRIC_WEIGHT: 147.4 KG
BH CV ECHO MEAS - EDV(CUBED): 123.4 ML
BH CV ECHO MEAS - EDV(TEICH): 117.1 ML
BH CV ECHO MEAS - EF(CUBED): 58.2 %
BH CV ECHO MEAS - EF(TEICH): 49.6 %
BH CV ECHO MEAS - ESV(CUBED): 51.5 ML
BH CV ECHO MEAS - ESV(TEICH): 58.9 ML
BH CV ECHO MEAS - FS: 25.2 %
BH CV ECHO MEAS - IVS/LVPW: 0.75
BH CV ECHO MEAS - IVSD: 1.3 CM
BH CV ECHO MEAS - LA DIMENSION(2D): 3 CM
BH CV ECHO MEAS - LV MASS(C)D: 330.6 GRAMS
BH CV ECHO MEAS - LV MASS(C)DI: 128.8 GRAMS/M^2
BH CV ECHO MEAS - LV MAX PG: 3.6 MMHG
BH CV ECHO MEAS - LV MEAN PG: 2.2 MMHG
BH CV ECHO MEAS - LV V1 MAX: 94.9 CM/SEC
BH CV ECHO MEAS - LV V1 MEAN: 69.6 CM/SEC
BH CV ECHO MEAS - LV V1 VTI: 30.5 CM
BH CV ECHO MEAS - LVIDD: 5 CM
BH CV ECHO MEAS - LVIDS: 3.7 CM
BH CV ECHO MEAS - LVOT AREA: 5 CM^2
BH CV ECHO MEAS - LVOT DIAM: 2.5 CM
BH CV ECHO MEAS - LVPWD: 1.8 CM
BH CV ECHO MEAS - MV A MAX VEL: 114.9 CM/SEC
BH CV ECHO MEAS - MV DEC SLOPE: 522.3 CM/SEC^2
BH CV ECHO MEAS - MV DEC TIME: 0.22 SEC
BH CV ECHO MEAS - MV E MAX VEL: 115.7 CM/SEC
BH CV ECHO MEAS - MV E/A: 1
BH CV ECHO MEAS - MV MAX PG: 6.5 MMHG
BH CV ECHO MEAS - MV MEAN PG: 3 MMHG
BH CV ECHO MEAS - MV V2 MAX: 127.1 CM/SEC
BH CV ECHO MEAS - MV V2 MEAN: 79.5 CM/SEC
BH CV ECHO MEAS - MV V2 VTI: 34.5 CM
BH CV ECHO MEAS - MVA(VTI): 4.4 CM^2
BH CV ECHO MEAS - PA ACC TIME: 0.12 SEC
BH CV ECHO MEAS - PA MAX PG (FULL): 0.77 MMHG
BH CV ECHO MEAS - PA MAX PG: 2.8 MMHG
BH CV ECHO MEAS - PA MEAN PG (FULL): 0.44 MMHG
BH CV ECHO MEAS - PA MEAN PG: 2 MMHG
BH CV ECHO MEAS - PA PR(ACCEL): 25 MMHG
BH CV ECHO MEAS - PA V2 MAX: 84.4 CM/SEC
BH CV ECHO MEAS - PA V2 MEAN: 69.3 CM/SEC
BH CV ECHO MEAS - PA V2 VTI: 20.8 CM
BH CV ECHO MEAS - PVA(I,A): 7.3 CM^2
BH CV ECHO MEAS - PVA(I,D): 7.3 CM^2
BH CV ECHO MEAS - PVA(V,A): 6 CM^2
BH CV ECHO MEAS - PVA(V,D): 6 CM^2
BH CV ECHO MEAS - QP/QS: 1
BH CV ECHO MEAS - RAP SYSTOLE: 3 MMHG
BH CV ECHO MEAS - RV MAX PG: 2.1 MMHG
BH CV ECHO MEAS - RV MEAN PG: 1.6 MMHG
BH CV ECHO MEAS - RV V1 MAX: 72.1 CM/SEC
BH CV ECHO MEAS - RV V1 MEAN: 61.7 CM/SEC
BH CV ECHO MEAS - RV V1 VTI: 21.7 CM
BH CV ECHO MEAS - RVDD: 3.4 CM
BH CV ECHO MEAS - RVOT AREA: 7 CM^2
BH CV ECHO MEAS - RVOT DIAM: 3 CM
BH CV ECHO MEAS - RVSP: 12.7 MMHG
BH CV ECHO MEAS - SI(AO): 118.3 ML/M^2
BH CV ECHO MEAS - SI(CUBED): 28 ML/M^2
BH CV ECHO MEAS - SI(LVOT): 59 ML/M^2
BH CV ECHO MEAS - SI(TEICH): 22.7 ML/M^2
BH CV ECHO MEAS - SV(AO): 303.5 ML
BH CV ECHO MEAS - SV(CUBED): 71.8 ML
BH CV ECHO MEAS - SV(LVOT): 151.3 ML
BH CV ECHO MEAS - SV(RVOT): 152.4 ML
BH CV ECHO MEAS - SV(TEICH): 58.1 ML
BH CV ECHO MEAS - TR MAX VEL: 155.8 CM/SEC
BH CV XLRA MEAS LEFT CCA RATIO VEL: 106 CM/SEC
BH CV XLRA MEAS LEFT DIST CCA EDV: -22 CM/SEC
BH CV XLRA MEAS LEFT DIST CCA PSV: -83.4 CM/SEC
BH CV XLRA MEAS LEFT DIST ICA EDV: -25.8 CM/SEC
BH CV XLRA MEAS LEFT DIST ICA PSV: -77.4 CM/SEC
BH CV XLRA MEAS LEFT ICA RATIO VEL: -77.4 CM/SEC
BH CV XLRA MEAS LEFT ICA/CCA RATIO: -0.73
BH CV XLRA MEAS LEFT PROX CCA EDV: 20.8 CM/SEC
BH CV XLRA MEAS LEFT PROX CCA PSV: 106 CM/SEC
BH CV XLRA MEAS LEFT PROX ECA PSV: -116 CM/SEC
BH CV XLRA MEAS LEFT PROX ICA EDV: -22.5 CM/SEC
BH CV XLRA MEAS LEFT PROX ICA PSV: -68.6 CM/SEC
BH CV XLRA MEAS LEFT PROX SCLA PSV: 101 CM/SEC
BH CV XLRA MEAS LEFT VERTEBRAL A PSV: 48.8 CM/SEC
BH CV XLRA MEAS RIGHT CCA RATIO VEL: 92.6 CM/SEC
BH CV XLRA MEAS RIGHT DIST CCA EDV: -17.6 CM/SEC
BH CV XLRA MEAS RIGHT DIST CCA PSV: -60 CM/SEC
BH CV XLRA MEAS RIGHT DIST ICA EDV: -13.8 CM/SEC
BH CV XLRA MEAS RIGHT DIST ICA PSV: -66.8 CM/SEC
BH CV XLRA MEAS RIGHT ICA RATIO VEL: -75.7 CM/SEC
BH CV XLRA MEAS RIGHT ICA/CCA RATIO: -0.82
BH CV XLRA MEAS RIGHT PROX CCA EDV: 11.8 CM/SEC
BH CV XLRA MEAS RIGHT PROX CCA PSV: 92.6 CM/SEC
BH CV XLRA MEAS RIGHT PROX ECA PSV: 135 CM/SEC
BH CV XLRA MEAS RIGHT PROX ICA EDV: -20.6 CM/SEC
BH CV XLRA MEAS RIGHT PROX ICA PSV: -75.7 CM/SEC
BH CV XLRA MEAS RIGHT PROX SCLA PSV: 133 CM/SEC
BH CV XLRA MEAS RIGHT VERTEBRAL A PSV: 60 CM/SEC
BUN SERPL-MCNC: 24 MG/DL (ref 8–23)
BUN/CREAT SERPL: 22.4 (ref 7–25)
CALCIUM SPEC-SCNC: 8.9 MG/DL (ref 8.6–10.5)
CHLORIDE SERPL-SCNC: 107 MMOL/L (ref 98–107)
CHOLEST SERPL-MCNC: 138 MG/DL (ref 0–200)
CO2 SERPL-SCNC: 24 MMOL/L (ref 22–29)
CREAT SERPL-MCNC: 1.07 MG/DL (ref 0.76–1.27)
DEPRECATED RDW RBC AUTO: 45.5 FL (ref 37–54)
EOSINOPHIL # BLD AUTO: 0.2 10*3/MM3 (ref 0–0.4)
EOSINOPHIL NFR BLD AUTO: 2.2 % (ref 0.3–6.2)
ERYTHROCYTE [DISTWIDTH] IN BLOOD BY AUTOMATED COUNT: 15.2 % (ref 12.3–15.4)
FOLATE SERPL-MCNC: 8.62 NG/ML (ref 4.78–24.2)
GFR SERPL CREATININE-BSD FRML MDRD: 68 ML/MIN/1.73
GLUCOSE SERPL-MCNC: 98 MG/DL (ref 65–99)
HBA1C MFR BLD: 5.9 % (ref 3.5–5.6)
HCT VFR BLD AUTO: 39.4 % (ref 37.5–51)
HDLC SERPL-MCNC: 43 MG/DL (ref 40–60)
HGB BLD-MCNC: 13.1 G/DL (ref 13–17.7)
LDLC SERPL CALC-MCNC: 75 MG/DL (ref 0–100)
LDLC/HDLC SERPL: 1.7 {RATIO}
LEFT ARM BP: NORMAL MMHG
LYMPHOCYTES # BLD AUTO: 2.6 10*3/MM3 (ref 0.7–3.1)
LYMPHOCYTES NFR BLD AUTO: 23.8 % (ref 19.6–45.3)
MCH RBC QN AUTO: 28.2 PG (ref 26.6–33)
MCHC RBC AUTO-ENTMCNC: 33.3 G/DL (ref 31.5–35.7)
MCV RBC AUTO: 84.6 FL (ref 79–97)
MONOCYTES # BLD AUTO: 0.8 10*3/MM3 (ref 0.1–0.9)
MONOCYTES NFR BLD AUTO: 7.2 % (ref 5–12)
NEUTROPHILS NFR BLD AUTO: 66.3 % (ref 42.7–76)
NEUTROPHILS NFR BLD AUTO: 7.3 10*3/MM3 (ref 1.7–7)
NRBC BLD AUTO-RTO: 0.2 /100 WBC (ref 0–0.2)
PLATELET # BLD AUTO: 275 10*3/MM3 (ref 140–450)
PMV BLD AUTO: 8.4 FL (ref 6–12)
POTASSIUM SERPL-SCNC: 3.9 MMOL/L (ref 3.5–5.2)
RBC # BLD AUTO: 4.66 10*6/MM3 (ref 4.14–5.8)
RIGHT ARM BP: NORMAL MMHG
SARS-COV-2 RNA PNL SPEC NAA+PROBE: NOT DETECTED
SODIUM SERPL-SCNC: 142 MMOL/L (ref 136–145)
TRIGL SERPL-MCNC: 109 MG/DL (ref 0–150)
TSH SERPL DL<=0.05 MIU/L-ACNC: 2 UIU/ML (ref 0.27–4.2)
VIT B12 BLD-MCNC: 677 PG/ML (ref 211–946)
VLDLC SERPL-MCNC: 20 MG/DL (ref 5–40)
WBC # BLD AUTO: 11.1 10*3/MM3 (ref 3.4–10.8)

## 2021-04-14 PROCEDURE — 99214 OFFICE O/P EST MOD 30 MIN: CPT | Performed by: NURSE PRACTITIONER

## 2021-04-14 PROCEDURE — G0378 HOSPITAL OBSERVATION PER HR: HCPCS

## 2021-04-14 PROCEDURE — 84443 ASSAY THYROID STIM HORMONE: CPT | Performed by: NURSE PRACTITIONER

## 2021-04-14 PROCEDURE — 99217 PR OBSERVATION CARE DISCHARGE MANAGEMENT: CPT | Performed by: INTERNAL MEDICINE

## 2021-04-14 PROCEDURE — 83036 HEMOGLOBIN GLYCOSYLATED A1C: CPT | Performed by: NURSE PRACTITIONER

## 2021-04-14 PROCEDURE — 25010000002 SULFUR HEXAFLUORIDE MICROSPH 60.7-25 MG RECONSTITUTED SUSPENSION: Performed by: INTERNAL MEDICINE

## 2021-04-14 PROCEDURE — 80061 LIPID PANEL: CPT | Performed by: INTERNAL MEDICINE

## 2021-04-14 PROCEDURE — 96372 THER/PROPH/DIAG INJ SC/IM: CPT

## 2021-04-14 PROCEDURE — 93306 TTE W/DOPPLER COMPLETE: CPT

## 2021-04-14 PROCEDURE — 70551 MRI BRAIN STEM W/O DYE: CPT

## 2021-04-14 PROCEDURE — 80048 BASIC METABOLIC PNL TOTAL CA: CPT | Performed by: INTERNAL MEDICINE

## 2021-04-14 PROCEDURE — 25010000002 HEPARIN (PORCINE) PER 1000 UNITS: Performed by: INTERNAL MEDICINE

## 2021-04-14 PROCEDURE — 85025 COMPLETE CBC W/AUTO DIFF WBC: CPT | Performed by: INTERNAL MEDICINE

## 2021-04-14 PROCEDURE — 93880 EXTRACRANIAL BILAT STUDY: CPT

## 2021-04-14 PROCEDURE — 93306 TTE W/DOPPLER COMPLETE: CPT | Performed by: INTERNAL MEDICINE

## 2021-04-14 RX ADMIN — HEPARIN SODIUM 5000 UNITS: 5000 INJECTION INTRAVENOUS; SUBCUTANEOUS at 08:57

## 2021-04-14 RX ADMIN — LISINOPRIL 40 MG: 20 TABLET ORAL at 08:57

## 2021-04-14 RX ADMIN — Medication 10 ML: at 08:56

## 2021-04-14 RX ADMIN — NIFEDIPINE 60 MG: 60 TABLET, FILM COATED, EXTENDED RELEASE ORAL at 08:56

## 2021-04-14 RX ADMIN — SULFUR HEXAFLUORIDE 2 ML: KIT at 08:46

## 2021-04-14 RX ADMIN — ATENOLOL 50 MG: 50 TABLET ORAL at 08:57

## 2021-04-14 NOTE — PLAN OF CARE
Goal Outcome Evaluation:  Plan of Care Reviewed With: patient  Progress: improving  Outcome Summary: discharging

## 2021-04-14 NOTE — CONSULTS
Primary Care Provider: Yun Ruff, *     Consult requested by: Dr. Luo    Reason for Consultation: Neurological evaluation, dizziness    History taken from: patient chart RN    Chief complaint: Dizziness       SUBJECTIVE:    History of present illness: Patient states he was shopping yesterday in [facility] when he developed acute onset of lightheadedness, diaphoresis, and feeling faint.  He never did pass out, he was able to get to his car and felt immediate relief when sitting down.  He denied any chest pain, chest palpitations, focal deficits, diplopia or vision changes, syncope or loss of consciousness.  Patient at that time went home and when he got up out of the car he had acute onset of the same symptoms.  At that time he decided to call EMS to come into the hospital and be evaluated.  He denies any history of anything happening in the past like this.  He denies history of strokes or seizures.  He denies any room spinning or vertigo type feeling, no nausea or vomiting.  He states he has not had any medicine changes other than an antiinflammatory medication he just started last week for his back.  He does have history of uncontrolled sleep apnea, he does not wear his BiPAP because he states he is constantly waking up to take care of his wife.    Review of Systems   Constitutional: Positive for fatigue.   Eyes: Negative for visual disturbance.   Cardiovascular: Negative.    Gastrointestinal: Negative for diarrhea, nausea and vomiting.   Musculoskeletal: Negative for neck pain.   Skin: Negative.    Neurological: Positive for weakness (Generalized weakness ) and light-headedness. Negative for dizziness, tremors, seizures, syncope, facial asymmetry, speech difficulty, numbness and headaches.          PATIENT HISTORY:  Past Medical History:   Diagnosis Date   • Anemia    • CAD (coronary artery disease)    • Chest pain    • Diastolic dysfunction     Stage I   • Dizziness and giddiness    • Dyspnea    •  Hyperlipidemia    • Hypertension    • Left ventricular hypertrophy     Mild concentric   • Mitral regurgitation     Trace   • Morbid obesity with BMI of 40.0-44.9, adult (CMS/[facility])    • RAFAEL on CPAP     CPAP OFF/ON   • Pes anserine bursitis    • Prostate cancer (CMS/[facility])    • Rotator cuff tendinitis    • Sinusitis    • TIA (transient ischemic attack) 2009   • Tricuspid regurgitation    ,   Past Surgical History:   Procedure Laterality Date   • CARDIAC CATHETERIZATION N/A 12/18/2018    Procedure: Left Heart Cath;  Surgeon: Paxton Grant MD;  Location: [facility] CATH INVASIVE LOCATION;  Service: Cardiovascular   • CARDIAC CATHETERIZATION N/A 12/18/2018    Procedure: Coronary angiography;  Surgeon: Paxton Grant MD;  Location: [facility] CATH INVASIVE LOCATION;  Service: Cardiovascular   • CARDIAC CATHETERIZATION N/A 12/18/2018    Procedure: Left ventriculography;  Surgeon: Paxton Grant MD;  Location: [facility] CATH INVASIVE LOCATION;  Service: Cardiovascular   • COLONOSCOPY N/A 12/5/2016    Procedure: COLONOSCOPY with polypectomy (cold biopsy);  Surgeon: Alex Gallego MD;  Location: [facility] ENDOSCOPY;  Service:    • EYE SURGERY      CATARACTS   • KNEE SURGERY Left 1966   • PROSTATE SURGERY  2014   • SHOULDER SURGERY Left 2013   • URINARY SPHINCTER IMPLANT N/A 2/12/2020    Procedure: INSERTION OF ARTIFICIAL URINARY SPHINCTER;  Surgeon: Paxton Rosado MD;  Location: [facility] OR;  Service: Urology;  Laterality: N/A;   ,   Family History   Problem Relation Age of Onset   • Heart disease Mother    • Hypertension Mother    • Heart disease Father    • Hypertension Father    • Diabetes Sister    • Hypertension Sister    • Diabetes Brother    • Heart disease Brother    • Hypertension Brother    • Heart disease Brother    • Hypertension Brother    • Diabetes Brother    • Hypertension Brother    • Malig Hyperthermia Neg Hx    ,   Social History     Tobacco Use   • Smoking status: Former Smoker      Types: Cigarettes     Quit date:      Years since quittin.3   • Smokeless tobacco: Never Used   • Tobacco comment: Caffeine use -6 drinks per day   Vaping Use   • Vaping Use: Never used   Substance Use Topics   • Alcohol use: Yes     Comment: social   • Drug use: No   ,   Medications Prior to Admission   Medication Sig Dispense Refill Last Dose   • ASPIR-LOW 81 MG EC tablet Take 81 mg by mouth Every Night.   2021 at Unknown time   • atenolol (TENORMIN) 50 MG tablet Take 50 mg by mouth Daily.   2021 at Unknown time   • furosemide (LASIX) 20 MG tablet Take 20 mg by mouth Daily.   2021 at 0800   • lisinopril (PRINIVIL,ZESTRIL) 40 MG tablet Take 40 mg by mouth Daily.   2021 at 0800   • naproxen (NAPROSYN) 500 MG tablet Take 500 mg by mouth 2 (Two) Times a Day As Needed for Mild Pain .   2021 at 0800   • NIFEdipine CC (ADALAT CC) 60 MG 24 hr tablet Take 60 mg by mouth Daily.   2021 at 0800   • potassium chloride (K-DUR,KLOR-CON) 10 MEQ CR tablet Take 10 mEq by mouth Daily.   2021 at 0800   • pravastatin (PRAVACHOL) 40 MG tablet Take 40 mg by mouth Every Night.   2021 at Unknown time   , Scheduled Meds:  aspirin, 81 mg, Oral, Nightly  atenolol, 50 mg, Oral, Daily  atorvastatin, 10 mg, Oral, Nightly  heparin (porcine), 5,000 Units, Subcutaneous, Q12H  lisinopril, 40 mg, Oral, Daily  NIFEdipine CC, 60 mg, Oral, Daily  sodium chloride, 10 mL, Intravenous, Q12H    , Continuous Infusions:   , PRN Meds:  •  acetaminophen **OR** acetaminophen **OR** acetaminophen  •  [COMPLETED] Insert peripheral IV **AND** sodium chloride  •  sodium chloride, Allergies:  Patient has no known allergies.    ________________________________________________________        OBJECTIVE:    PHYSICAL EXAM:    Constitutional: The patient is in no apparent distress, bright awake and alert. There is no shortness of breath.   PSYCHIATRIC: Mood/affect normal, judgement normal, appropriate  HEENT: Normocephalic,  atraumatic.   Chest: Breathing unlabored  Cardiac: Regular rate and rhythm.   Extremities:  No clubbing, cyanosis or edema.    NEUROLOGICAL:    Cognition:   Fully oriented.  Fund of knowledge excellent.  Concentration and attention normal.   Language normal with normal comprehension, fluent speech, intact repetition and naming.   Short and long term memory appears intact    Cranial nerves;    II - pupils bilaterally equal reacting to light,  No new Visual field deficits;  Fundoscopic exam- Not able to be done, non-dilated exam  III,IV,VI: EOMI with no diplopia  V: Normal facial sensations  VII: No facial asymmetry,  VIII: No New hearing abnormality  IX, X, XI: normal gag and shoulder shrug;  XII: tongue is in the midline.    Sensory:  Intact to light touch in all extremities.   Motor: Strength 5/5 bilaterally upper and lower extremities. No involuntary movements present. Normal tone and bulk.    Cerebellar: Finger to nose and mirror movements normal bilaterally.    Gait and balance: Deferred.     Physical exam performed by WILLA Vega.  ________________________________________________________   RESULTS REVIEW:    VITAL SIGNS:   Temp:  [97.6 °F (36.4 °C)-99.1 °F (37.3 °C)] 97.8 °F (36.6 °C)  Heart Rate:  [60-81] 65  Resp:  [18-20] 20  BP: (125-166)/(71-89) 160/88     LABS:  WBC   Date Value Ref Range Status   04/14/2021 11.10 (H) 3.40 - 10.80 10*3/mm3 Final     RBC   Date Value Ref Range Status   04/14/2021 4.66 4.14 - 5.80 10*6/mm3 Final     Hemoglobin   Date Value Ref Range Status   04/14/2021 13.1 13.0 - 17.7 g/dL Final     Hematocrit   Date Value Ref Range Status   04/14/2021 39.4 37.5 - 51.0 % Final     MCV   Date Value Ref Range Status   04/14/2021 84.6 79.0 - 97.0 fL Final     MCH   Date Value Ref Range Status   04/14/2021 28.2 26.6 - 33.0 pg Final     MCHC   Date Value Ref Range Status   04/14/2021 33.3 31.5 - 35.7 g/dL Final     RDW   Date Value Ref Range Status   04/14/2021 15.2 12.3 - 15.4 % Final      RDW-SD   Date Value Ref Range Status   04/14/2021 45.5 37.0 - 54.0 fl Final     MPV   Date Value Ref Range Status   04/14/2021 8.4 6.0 - 12.0 fL Final     Platelets   Date Value Ref Range Status   04/14/2021 275 140 - 450 10*3/mm3 Final     Neutrophil %   Date Value Ref Range Status   04/14/2021 66.3 42.7 - 76.0 % Final     Lymphocyte %   Date Value Ref Range Status   04/14/2021 23.8 19.6 - 45.3 % Final     Monocyte %   Date Value Ref Range Status   04/14/2021 7.2 5.0 - 12.0 % Final     Eosinophil %   Date Value Ref Range Status   04/14/2021 2.2 0.3 - 6.2 % Final     Basophil %   Date Value Ref Range Status   04/14/2021 0.5 0.0 - 1.5 % Final     Neutrophils, Absolute   Date Value Ref Range Status   04/14/2021 7.30 (H) 1.70 - 7.00 10*3/mm3 Final     Lymphocytes, Absolute   Date Value Ref Range Status   04/14/2021 2.60 0.70 - 3.10 10*3/mm3 Final     Monocytes, Absolute   Date Value Ref Range Status   04/14/2021 0.80 0.10 - 0.90 10*3/mm3 Final     Eosinophils, Absolute   Date Value Ref Range Status   04/14/2021 0.20 0.00 - 0.40 10*3/mm3 Final     Basophils, Absolute   Date Value Ref Range Status   04/14/2021 0.10 0.00 - 0.20 10*3/mm3 Final     nRBC   Date Value Ref Range Status   04/14/2021 0.2 0.0 - 0.2 /100 WBC Final     Glucose   Date Value Ref Range Status   04/14/2021 98 65 - 99 mg/dL Final     BUN   Date Value Ref Range Status   04/14/2021 24 (H) 8 - 23 mg/dL Final     Creatinine   Date Value Ref Range Status   04/14/2021 1.07 0.76 - 1.27 mg/dL Final     Sodium   Date Value Ref Range Status   04/14/2021 142 136 - 145 mmol/L Final     Potassium   Date Value Ref Range Status   04/14/2021 3.9 3.5 - 5.2 mmol/L Final     Chloride   Date Value Ref Range Status   04/14/2021 107 98 - 107 mmol/L Final     CO2   Date Value Ref Range Status   04/14/2021 24.0 22.0 - 29.0 mmol/L Final     Calcium   Date Value Ref Range Status   04/14/2021 8.9 8.6 - 10.5 mg/dL Final     eGFR Non  Amer   Date Value Ref Range Status    2021 68 >60 mL/min/1.73 Final     BUN/Creatinine Ratio   Date Value Ref Range Status   2021 22.4 7.0 - 25.0 Final     Anion Gap   Date Value Ref Range Status   2021 11.0 5.0 - 15.0 mmol/L Final       Lab Results   Component Value Date    LDL 75 2021         IMAGING STUDIES:  CT Head Without Contrast    Result Date: 2021   1. No acute intracranial findings. 2. Mild atrophy and mild chronic microvascular disease.  Electronically Signed By-Christina Arenas MD On:2021 5:00 PM This report was finalized on [id number] by  Christina Arenas MD.    MRI Brain Without Contrast    Result Date: 2021  1.No acute intracranial process identified. 2.Findings suggestive of mild chronic small vessel ischemic disease.  Electronically Signed By-Cristi Hughes MD On:2021 8:07 AM This report was finalized on [id number] by  Cristi Hughes MD.      I reviewed the patient's new clinical results.      ________________________________________________________     PROBLEM LIST:    Dizziness    CAD (coronary artery disease)    Essential hypertension    History of malignant neoplasm of prostate    Mixed hyperlipidemia    Obesity, Class III, BMI 40-49.9 (morbid obesity) (CMS/[facility])    Diastolic dysfunction    Stage 3a chronic kidney disease (CMS/[facility])          Assessment/Plan   ASSESSMENT/PLAN:  1. Transient lightheadedness and presyncope, all resolved.  Patient denied vertigo , focal deficits, diplopia or syncope.  MRI brain negative. Eltiology unclear, possibly cardiac related. This does not look to be primary neurological etiology.  - MRI brain reviewed, unremarkable for any acute findings.  There is mild chronic small vessel ischemic disease.    - Check carotid duplex  - EKG: Ectopic atrial rhythm, rate 81  - A1C: 5.9, B12: 677, LDL: 75, TSH: 2.0  - Orthostatic Vitals normal  (lyin/80 62 sittin/97 93, standin/93 69)  - 2d echo pending,  would recommend d/c home with Holter  monitor to rule out arrhythmias.     Will follow up on carotid duplex, no further recommendations at this time.  I discussed the patient's findings and my recommendations with patient and nursing staff    RENETTA Gatica  04/14/21  09:45 EDT

## 2021-04-14 NOTE — PLAN OF CARE
Problem: Adult Inpatient Plan of Care  Goal: Plan of Care Review  Outcome: Ongoing, Progressing  Flowsheets  Taken 4/14/2021 0218 by Regulo Barber, RN  Progress: no change  Plan of Care Reviewed With: patient  Taken 4/13/2021 1825 by Sally Marcum RN  Outcome Summary: New admit from ED. Pt. states he was at [facility] and developed dizziness, drove home then called EMS.   Goal Outcome Evaluation:  Plan of Care Reviewed With: patient  Progress: no change

## 2021-04-14 NOTE — PLAN OF CARE
Goal Outcome Evaluation:  Plan of Care Reviewed With: patient  Progress: improving  Outcome Summary: Echo and MRI resulted no acute finding awaiting on results of carotid ultra sound

## 2021-04-14 NOTE — DISCHARGE SUMMARY
Date of Admission: 4/13/2021  111/1    Date of Discharge:  4/14/2021    Length of stay:  LOS: 0 days     Patient was examined with relevant and adequate PPE keeping in mind the current coronavirus pandemic. Minimum of 10 minutes to don and doff PPE.      Presenting Problem/History of Present Illness   Present on Admission:  • Dizziness  • CAD (coronary artery disease)  • Essential hypertension  • Mixed hyperlipidemia  • Diastolic dysfunction  • Obesity, Class III, BMI 40-49.9 (morbid obesity) (CMS/[facility])  • Stage 3a chronic kidney disease (CMS/[facility])  • Thyroid nodule        Hospital Course  Chief complaint:  Chief Complaint   Patient presents with   • Dizziness       Jake Alberts 75 y.o. male.    PCP  Yun Ruff MD (General)    Patient is a 75-year-old male who states he developed a vague dizziness over the past several hours.  He states his dizziness that he cannot ambulate or stand.  States the dizziness is worse on sitting and standing.  He denies headache neck pain cough fever chest pain shortness of breath or other complaint  Patient unable to ambulate due to dizziness.  Per ER MD patient was leaning on one side.      Patient admitted with dizziness.  Work-up negative for any stroke.  He was seen in consult by neurology.  MRI negative.  Ultrasound carotids negative.  Incidental bilateral thyroid nodules.  He is to follow with the PCP for that for work-up if needed.  I do not see a reason for his Lasix.  Creatinine was a little bumped up.  I am holding that along with the NSAIDs.  PCP to address if needed  Review of Systems   Constitutional: Negative.   HENT: Negative.    Eyes: Negative.    Cardiovascular: Negative.    Respiratory: Negative.    Endocrine: Negative.    Hematologic/Lymphatic: Negative.    Skin: Negative.    Musculoskeletal: Negative.    Gastrointestinal: Negative.    Genitourinary: Negative.    Neurological: Negative.    Psychiatric/Behavioral: Negative.    Allergic/Immunologic:  Negative.    All other systems reviewed and are negative.          Family History   Problem Relation Age of Onset   • Heart disease Mother    • Hypertension Mother    • Heart disease Father    • Hypertension Father    • Diabetes Sister    • Hypertension Sister    • Diabetes Brother    • Heart disease Brother    • Hypertension Brother    • Heart disease Brother    • Hypertension Brother    • Diabetes Brother    • Hypertension Brother    • Malig Hyperthermia Neg Hx         Past Medical History:   Diagnosis Date   • Anemia    • CAD (coronary artery disease)    • Chest pain    • Diastolic dysfunction     Stage I   • Dizziness and giddiness    • Dyspnea    • Hyperlipidemia    • Hypertension    • Left ventricular hypertrophy     Mild concentric   • Mitral regurgitation     Trace   • Morbid obesity with BMI of 40.0-44.9, adult (CMS/[facility])    • RAFAEL on CPAP     CPAP OFF/ON   • Pes anserine bursitis    • Prostate cancer (CMS/[facility])    • Rotator cuff tendinitis    • Sinusitis    • TIA (transient ischemic attack) 2009   • Tricuspid regurgitation        Past Surgical History:   Procedure Laterality Date   • CARDIAC CATHETERIZATION N/A 12/18/2018    Procedure: Left Heart Cath;  Surgeon: Paxton Grant MD;  Location: [facility] CATH INVASIVE LOCATION;  Service: Cardiovascular   • CARDIAC CATHETERIZATION N/A 12/18/2018    Procedure: Coronary angiography;  Surgeon: Paxton Grant MD;  Location:  [facility] CATH INVASIVE LOCATION;  Service: Cardiovascular   • CARDIAC CATHETERIZATION N/A 12/18/2018    Procedure: Left ventriculography;  Surgeon: Paxton Grant MD;  Location:  [facility] CATH INVASIVE LOCATION;  Service: Cardiovascular   • COLONOSCOPY N/A 12/5/2016    Procedure: COLONOSCOPY with polypectomy (cold biopsy);  Surgeon: Alex Gallego MD;  Location: [facility] ENDOSCOPY;  Service:    • EYE SURGERY      CATARACTS   • KNEE SURGERY Left 1966   • PROSTATE SURGERY  2014   • SHOULDER SURGERY Left 2013   • URINARY SPHINCTER  IMPLANT N/A 2020    Procedure: INSERTION OF ARTIFICIAL URINARY SPHINCTER;  Surgeon: Paxton Rosado MD;  Location: [facility] MAIN OR;  Service: Urology;  Laterality: N/A;       Social History     Socioeconomic History   • Marital status:      Spouse name: Not on file   • Number of children: Not on file   • Years of education: Not on file   • Highest education level: Not on file   Tobacco Use   • Smoking status: Former Smoker     Types: Cigarettes     Quit date:      Years since quittin.3   • Smokeless tobacco: Never Used   • Tobacco comment: Caffeine use -6 drinks per day   Vaping Use   • Vaping Use: Never used   Substance and Sexual Activity   • Alcohol use: Yes     Comment: social   • Drug use: No   • Sexual activity: Defer       Vital Signs  Temp:  [97.4 °F (36.3 °C)-97.9 °F (36.6 °C)] 97.4 °F (36.3 °C)  Heart Rate:  [54-93] 54  Resp:  [18-20] 18  BP: (128-166)/(61-97) 128/73  Weight change:     Physical Exam:  Physical Exam  Vitals and nursing note reviewed.   Constitutional:       General: He is not in acute distress.     Appearance: He is well-developed. He is obese. He is not ill-appearing, toxic-appearing or diaphoretic.   HENT:      Head: Normocephalic and atraumatic.      Right Ear: Ear canal and external ear normal.      Left Ear: Ear canal and external ear normal.      Nose: Nose normal. No congestion or rhinorrhea.      Mouth/Throat:      Mouth: Mucous membranes are moist.      Pharynx: No oropharyngeal exudate.   Eyes:      General: No scleral icterus.        Right eye: No discharge.         Left eye: No discharge.      Extraocular Movements: Extraocular movements intact.      Conjunctiva/sclera: Conjunctivae normal.      Pupils: Pupils are equal, round, and reactive to light.   Neck:      Thyroid: No thyromegaly.      Vascular: No carotid bruit or JVD.      Trachea: No tracheal deviation.   Cardiovascular:      Rate and Rhythm: Normal rate and regular rhythm.      Pulses:  Normal pulses.      Heart sounds: Normal heart sounds. No murmur heard.   No friction rub. No gallop.       Comments: Nonpitting edema legs  Pulmonary:      Effort: Pulmonary effort is normal. No respiratory distress.      Breath sounds: Normal breath sounds. No stridor. No wheezing, rhonchi or rales.   Chest:      Chest wall: No tenderness.   Abdominal:      General: Bowel sounds are normal. There is no distension.      Palpations: Abdomen is soft. There is no mass.      Tenderness: There is no abdominal tenderness. There is no guarding or rebound.      Hernia: No hernia is present.   Musculoskeletal:         General: No swelling, tenderness, deformity or signs of injury. Normal range of motion.      Cervical back: Normal range of motion and neck supple. No rigidity. No muscular tenderness.      Right lower leg: No edema.      Left lower leg: No edema.   Lymphadenopathy:      Cervical: No cervical adenopathy.   Skin:     General: Skin is warm and dry.      Coloration: Skin is not jaundiced or pale.      Findings: No bruising, erythema or rash.   Neurological:      General: No focal deficit present.      Mental Status: He is alert and oriented to person, place, and time. Mental status is at baseline.      Cranial Nerves: No cranial nerve deficit.      Sensory: No sensory deficit.      Motor: No weakness or abnormal muscle tone.      Coordination: Coordination normal.      Comments: No adysdiadochokinesis  Gait not examined   Psychiatric:         Mood and Affect: Mood normal.         Behavior: Behavior normal.         Thought Content: Thought content normal.         Judgment: Judgment normal.   Reviewed, no change in above data from the prior day.          Discharge Diagnosis:     Active Hospital Problems    Diagnosis  POA   • **Dizziness [R42]  Yes     Priority: High   • Thyroid nodule [E04.1]  Yes   • Diastolic dysfunction [I51.89]  Yes   • Stage 3a chronic kidney disease (CMS/HCC) [N18.31]  Yes   • Obesity, Class  III, BMI 40-49.9 (morbid obesity) (CMS/[facility]) [E66.01]  Yes   • CAD (coronary artery disease) [I25.10]  Yes   • Essential hypertension [I10]  Yes   • History of malignant neoplasm of prostate [Z85.46]  Not Applicable   • Mixed hyperlipidemia [E78.2]  Yes      Resolved Hospital Problems   No resolved problems to display.       Estimated Creatinine Clearance: 89.6 mL/min (by C-G formula based on SCr of 1.07 mg/dL).    Discharge Disposition    Continued Care and Services - Admitted Since 4/13/2021    Coordination has not been started for this encounter.             PT Recommendation and Plan          Home or Self Care           Discharge Medications      Continue These Medications      Instructions Start Date   Aspir-Low 81 MG EC tablet  Generic drug: aspirin   81 mg, Oral, Nightly      atenolol 50 MG tablet  Commonly known as: TENORMIN   50 mg, Oral, Daily      lisinopril 40 MG tablet  Commonly known as: PRINIVIL,ZESTRIL   40 mg, Oral, Daily      NIFEdipine CC 60 MG 24 hr tablet  Commonly known as: ADALAT CC   60 mg, Oral, Daily      pravastatin 40 MG tablet  Commonly known as: PRAVACHOL   40 mg, Oral, Nightly         Stop These Medications    furosemide 20 MG tablet  Commonly known as: LASIX     naproxen 500 MG tablet  Commonly known as: NAPROSYN     potassium chloride 10 MEQ CR tablet  Commonly known as: K-DURKLOR-CON          Discharge medications personally reviewed by me and med rec done by me personally.  04/14/21, 4:54 PM EDT        Consults:   Consults     Date and Time Order Name Status Description    4/13/2021  7:31 PM Inpatient Neurology Consult General Completed     4/13/2021  5:13 PM Hospitalist (on-call MD unless specified) Completed           Procedures Performed:    * No surgery found *        Pertinent Test Results:   Results from last 7 days   Lab Units 04/14/21  0402 04/13/21  1631   WBC 10*3/mm3 11.10* 13.80*   HEMOGLOBIN g/dL 13.1 13.0   HEMATOCRIT % 39.4 39.0   MCV fL 84.6 83.7   MCH pg 28.2 28.0    PLATELETS 10*3/mm3 275 292     Results from last 7 days   Lab Units 04/14/21  0402 04/13/21  1631   SODIUM mmol/L 142 143   POTASSIUM mmol/L 3.9 3.5   CHLORIDE mmol/L 107 105   CO2 mmol/L 24.0 27.0   BUN mg/dL 24* 26*   CREATININE mg/dL 1.07 1.17   CALCIUM mg/dL 8.9 9.2   GLUCOSE mg/dL 98 114*     Lab Results   Component Value Date    CALCIUM 8.9 04/14/2021     Hemoglobin A1C   Date Value Ref Range Status   04/14/2021 5.9 (H) 3.5 - 5.6 % Final     Lab Results   Component Value Date    CHOL 138 04/14/2021    TRIG 109 04/14/2021    HDL 43 04/14/2021    LDL 75 04/14/2021     No results found for: LIPASE      Pathology  Lab Results   Lab Value Date/Time    FINALDX  12/05/2016 1320     1: CECUM, BIOPSIES:   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    2: RIGHT/ASCENDING COLON, BIOPSIES:   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   BENIGN APPEARING LYMPHOID AGGREGATE.    3: RECTUM, BIOPSY:   FRAGMENTS OF HYPERPLASTIC POLYP.    NICOLE/jse     CPT CODES:  1: 56150  2: 48410  3: 91912       Inflammatory Biomarkers        Invalid input(s): ESR, D-DIMER QUANTITATIVE,  PROCALCITONIN  COVID19   Date Value Ref Range Status   04/13/2021 Not Detected Not Detected - Ref. Range Final        Microbiology Results (last 10 days)     Procedure Component Value - Date/Time    COVID PRE-OP / PRE-PROCEDURE SCREENING ORDER (NO ISOLATION) - Swab, Nasopharynx [[id number]]  (Normal) Collected: 04/13/21 2241    Lab Status: Final result Specimen: Swab from Nasopharynx Updated: 04/14/21 0003    Narrative:      The following orders were created for panel order COVID PRE-OP / PRE-PROCEDURE SCREENING ORDER (NO ISOLATION) - Swab, Nasopharynx.  Procedure                               Abnormality         Status                     ---------                               -----------         ------                     COVID-19,CEPHEID,COR/SUHAS...[[id number]]  Normal              Final result                 Please view results for  these tests on the individual orders.    COVID-19,CEPHEID,COR/SUHAS/PAD IN-HOUSE(OR EMERGENT/ADD-ON),NP SWAB IN TRANSPORT MEDIA 3-4 HR TAT, RT-PCR - Swab, Nasopharynx [[id number]]  (Normal) Collected: 04/13/21 2241    Lab Status: Final result Specimen: Swab from Nasopharynx Updated: 04/14/21 0003     COVID19 Not Detected    Narrative:      Fact sheet for providers: https://www.fda.gov/media/563109/download     Fact sheet for patients: https://www.fda.gov/media/479352/download          ECG/EMG Results (most recent)     Procedure Component Value Units Date/Time    ECG 12 Lead [[id number]] Collected: 04/13/21 1634     Updated: 04/13/21 1636     QT Interval 393 ms     Narrative:      HEART RATE= 81  bpm  RR Interval= 740  ms  NE Interval= 178  ms  P Horizontal Axis= 6  deg  P Front Axis= -58  deg  QRSD Interval= 97  ms  QT Interval= 393  ms  QRS Axis= -42  deg  T Wave Axis= 18  deg  - BORDERLINE ECG -  Ectopic atrial rhythm  Left axis deviation  When compared with ECG of 13-Feb-2020 7:55:18,  Significant change in rhythm  Significant axis, voltage or hypertrophy change  Electronically Signed By:   Date and Time of Study: 2021-04-13 16:34:24    Adult Transthoracic Echo Complete W/ Cont if Necessary Per Protocol [[id number]] Collected: 04/14/21 0812     Updated: 04/14/21 0950     BSA 2.6 m^2      RVIDd 3.4 cm      IVSd 1.3 cm      LVIDd 5.0 cm      LVIDs 3.7 cm      LVPWd 1.8 cm      IVS/LVPW 0.75     FS 25.2 %      EDV(Teich) 117.1 ml      ESV(Teich) 58.9 ml      EF(Teich) 49.6 %      EDV(cubed) 123.4 ml      ESV(cubed) 51.5 ml      EF(cubed) 58.2 %      LV mass(C)d 330.6 grams      LV mass(C)dI 128.8 grams/m^2      SV(Teich) 58.1 ml      SI(Teich) 22.7 ml/m^2      SV(cubed) 71.8 ml      SI(cubed) 28.0 ml/m^2      Ao root diam 3.5 cm      Ao root area 9.5 cm^2      ACS 2.4 cm      asc Aorta Diam 3.6 cm      LVOT diam 2.5 cm      LVOT area 5.0 cm^2      RVOT diam 3.0 cm      RVOT area 7.0 cm^2      Ao root area (BSA corrected)  1.4     MV E max naveen 115.7 cm/sec      MV A max naveen 114.9 cm/sec      MV E/A 1.0     MV V2 max 127.1 cm/sec      MV max PG 6.5 mmHg      MV V2 mean 79.5 cm/sec      MV mean PG 3.0 mmHg      MV V2 VTI 34.5 cm      MVA(VTI) 4.4 cm^2      MV dec slope 522.3 cm/sec^2      MV dec time 0.22 sec      Ao pk naveen 114.4 cm/sec      Ao max PG 5.2 mmHg      Ao max PG (full) 1.6 mmHg      Ao V2 mean 99.4 cm/sec      Ao mean PG 4.0 mmHg      Ao mean PG (full) 1.9 mmHg      Ao V2 VTI 32.1 cm      YAA(I,A) 4.7 cm^2      YAA(I,D) 4.7 cm^2      YAA(V,A) 4.1 cm^2      YAA(V,D) 4.1 cm^2      LV V1 max PG 3.6 mmHg      LV V1 mean PG 2.2 mmHg      LV V1 max 94.9 cm/sec      LV V1 mean 69.6 cm/sec      LV V1 VTI 30.5 cm      SV(Ao) 303.5 ml      SI(Ao) 118.3 ml/m^2      SV(LVOT) 151.3 ml      SV(RVOT) 152.4 ml      SI(LVOT) 59.0 ml/m^2      PA V2 max 84.4 cm/sec      PA max PG 2.8 mmHg      PA max PG (full) 0.77 mmHg      PA V2 mean 69.3 cm/sec      PA mean PG 2.0 mmHg      PA mean PG (full) 0.44 mmHg      PA V2 VTI 20.8 cm      PVA(I,A) 7.3 cm^2      BH CV ECHO ANGELA - PVA(I,D) 7.3 cm^2      BH CV ECHO ANGELA - PVA(V,A) 6.0 cm^2      BH CV ECHO ANGELA - PVA(V,D) 6.0 cm^2      PA acc time 0.12 sec      RV V1 max PG 2.1 mmHg      RV V1 mean PG 1.6 mmHg      RV V1 max 72.1 cm/sec      RV V1 mean 61.7 cm/sec      RV V1 VTI 21.7 cm      TR max naveen 155.8 cm/sec      RVSP(TR) 12.7 mmHg      RAP systole 3.0 mmHg      PA pr(Accel) 25.0 mmHg      Qp/Qs 1.0     BH CV ECHO ANGELA - BZI_BMI 46.6 kilograms/m^2      BH CV ECHO ANGELA - BSA(HAYCOCK) 2.8 m^2      BH CV ECHO ANGELA - BZI_METRIC_WEIGHT 147.4 kg      BH CV ECHO ANGELA - BZI_METRIC_HEIGHT 177.8 cm      LA dimension(2D) 3.0 cm           Results for orders placed during the hospital encounter of 04/13/21    Duplex Carotid Ultrasound CAR    Interpretation Summary  · Right internal carotid artery is normal.  · Left internal carotid artery is normal.  · Incidental thyroid nodule noted bilaterally. Consider  additional evaluation if clinically indicated.      Results for orders placed in visit on 02/27/18    SCANNED - ECHOCARDIOGRAM      CT Head Without Contrast    Result Date: 4/13/2021   1. No acute intracranial findings. 2. Mild atrophy and mild chronic microvascular disease.  Electronically Signed By-Christina Arenas MD On:4/13/2021 5:00 PM This report was finalized on [id number] by  Christina Arenas MD.    MRI Brain Without Contrast    Result Date: 4/14/2021  1.No acute intracranial process identified. 2.Findings suggestive of mild chronic small vessel ischemic disease.  Electronically Signed By-Cristi Hughes MD On:4/14/2021 8:07 AM This report was finalized on [id number] by  Cristi Hughes MD.      Xrays, labs reviewed personally by me.  04/14/21  4:54 PM EDT      Condition on Discharge:    Stable    Discharge Diet:   Dietary Orders (From admission, onward)     Start     Ordered    04/13/21 1930  Diet Cardiac; Healthy Heart  Diet Effective Now     Question Answer Comment   Diet / Texture / Consistency Cardiac    Select Type: Healthy Heart        04/13/21 1931                Activity at Discharge:   Activity Instructions     Activity as Tolerated            Follow-up Appointments    Future Appointments   Date Time Provider Department Center   5/25/2021 11:40 AM Joshua Tinsley MD [facility] None       Additional Instructions for the Follow-ups that You Need to Schedule     Discharge Follow-up with PCP   As directed       Currently Documented PCP:    Yun Ruff MD    PCP Phone Number:    [phone]     Follow Up Details: If no PCP, call MD finder at [phone]           Follow-up Information     Yun Ruff MD Follow up in 2 week(s).    Specialty: Internal Medicine  Why: fu on thyroid nodule if appropriate  Contact information:  [address]  [place] [address]  [phone]                    Test Results Pending at Discharge       Risk for Readmission (LACE)  Score: 5 (4/14/2021  6:01 AM)          Reagan Luo MD  04/14/21  16:54 EDT

## 2021-04-14 NOTE — PROGRESS NOTES
Discharge Planning Assessment  [facility]     Patient Name: Jake Alberts  MRN: [id number]  Today's Date: 4/14/2021    Admit Date: 4/13/2021    Discharge Needs Assessment     Row Name 04/14/21 1630       Living Environment    Lives With  spouse    Current Living Arrangements  home/apartment/condo    Primary Care Provided by  self    Able to Return to Prior Arrangements  yes       Resource/Environmental Concerns    Resource/Environmental Concerns  none    Transportation Concerns  car, none       Transition Planning    Patient/Family Anticipates Transition to  home with family    Patient/Family Anticipated Services at Transition  none    Transportation Anticipated  car, drives self;family or friend will provide Daughter will transport to home at [place]       Discharge Needs Assessment    Equipment Currently Used at Home  cane, straight;walker, standard;cpap;commode    Concerns to be Addressed  no discharge needs identified    Anticipated Changes Related to Illness  none    Equipment Needed After Discharge  none        Discharge Plan     Row Name 04/14/21 1639       Plan    Plan  Routine d/c to home      Plan Comments  Spoke to patient in room with mask and goggles maintaining 6 ft for less than 15 min. Patient is I with ADL's and drives. PCP is [facility] and Pharmacy is [facility] Barriers - CVA work- up        Continued Care and Services - Admitted Since 4/13/2021          Demographic Summary     Row Name 04/14/21 1630       General Information    Admission Type  observation    Arrived From  emergency department    Required Notices Provided  Observation Status Notice    Referral Source  admission list    Reason for Consult  discharge planning    Preferred Language  English        Functional Status     Row Name 04/14/21 1630       Functional Status, IADL    Medications  independent    Meal Preparation  independent    Housekeeping  independent    Laundry  independent    Shopping  independent       Mental Status     General Appearance WDL  WDL       Mental Status Summary    Recent Changes in Mental Status/Cognitive Functioning  no changes        Treva Fitzgerald RN, CM  Office Phone [phone]  Cell [phone]

## 2021-04-15 NOTE — PROGRESS NOTES
Case Management Discharge Note                Selected Continued Care - Discharged on 4/14/2021 Admission date: 4/13/2021 - Discharge disposition: Home or Self Care                     Final Discharge Disposition Code: 01 - home or self-care

## 2021-04-16 LAB — QT INTERVAL: 393 MS

## 2021-05-25 ENCOUNTER — OFFICE VISIT (OUTPATIENT)
Dept: CARDIOLOGY | Facility: CLINIC | Age: 76
End: 2021-05-25

## 2021-05-25 VITALS
DIASTOLIC BLOOD PRESSURE: 76 MMHG | OXYGEN SATURATION: 98 % | HEIGHT: 70 IN | WEIGHT: 315 LBS | HEART RATE: 69 BPM | BODY MASS INDEX: 45.1 KG/M2 | SYSTOLIC BLOOD PRESSURE: 128 MMHG

## 2021-05-25 DIAGNOSIS — I10 ESSENTIAL HYPERTENSION: Chronic | ICD-10-CM

## 2021-05-25 DIAGNOSIS — I25.10 CORONARY ARTERY DISEASE INVOLVING NATIVE CORONARY ARTERY OF NATIVE HEART WITHOUT ANGINA PECTORIS: Primary | ICD-10-CM

## 2021-05-25 PROCEDURE — 99214 OFFICE O/P EST MOD 30 MIN: CPT | Performed by: INTERNAL MEDICINE

## 2021-05-25 RX ORDER — FUROSEMIDE 20 MG/1
20 TABLET ORAL
COMMUNITY
Start: 2021-02-24 | End: 2021-06-23

## 2021-05-25 RX ORDER — POTASSIUM CHLORIDE 750 MG/1
CAPSULE, EXTENDED RELEASE ORAL 2 TIMES DAILY
COMMUNITY
Start: 2021-05-14 | End: 2022-09-08 | Stop reason: ALTCHOICE

## 2021-05-27 NOTE — PROGRESS NOTES
"      CARDIOLOGY    Joshua Tinsley MD    ENCOUNTER DATE:  05/25/2021    Jake Alberts / 75 y.o. / male        CHIEF COMPLAINT / REASON FOR OFFICE VISIT     Coronary Artery Disease (02/26/2020   Follow up)      HISTORY OF PRESENT ILLNESS       HPI  Jake Alberts is a 75 y.o. male who presents today for reevaluation.  Patient is getting marked swelling in both of his legs.  He said he had right knee surgery in June 2020 but here in the past several months his legs have started to swell bilaterally.  he has marked edema.      The following portions of the patient's history were reviewed and updated as appropriate: allergies, current medications, past family history, past medical history, past social history, past surgical history and problem list.      VITAL SIGNS     Visit Vitals  /76 (BP Location: Left arm)   Pulse 69   Ht 177.8 cm (70\")   Wt (!) 148 kg (327 lb)   SpO2 98%   BMI 46.92 kg/m²         Wt Readings from Last 3 Encounters:   05/25/21 (!) 148 kg (327 lb)   04/14/21 (!) 147 kg (325 lb)   02/26/20 136 kg (300 lb)     Body mass index is 46.92 kg/m².      REVIEW OF SYSTEMS   Review of Systems   All other systems reviewed and are negative.          PHYSICAL EXAMINATION     Constitutional:       Appearance: Healthy appearance.   Pulmonary:      Breath sounds: Normal breath sounds.   Cardiovascular:      Normal rate. Regular rhythm. Normal S1. Normal S2.      Murmurs: There is no murmur.      No gallop. No click. No rub.   Pulses:     Intact distal pulses.   Edema:     Peripheral edema present.     Pretibial: bilateral 2+ edema of the pretibial area.     Ankle: bilateral 2+ edema of the ankle.  Neurological:      Mental Status: Alert and oriented to person, place and time.           REVIEWED DATA     Procedures    Cardiac Procedures:  1.     Lipid Panel    Lipid Panel 4/14/21   Total Cholesterol 138   Triglycerides 109   HDL Cholesterol 43   VLDL Cholesterol 20   LDL Cholesterol  75   LDL/HDL " Ratio 1.70               ASSESSMENT & PLAN      Diagnosis Plan   1. Coronary artery disease involving native coronary artery of native heart without angina pectoris     2. Essential hypertension           SUMMARY/DISCUSSION  1. Coronary artery disease.  Cardiac catheterization back in December 2008 showed a 20 to 30% diffuse coronary artery disease with a maximum diameter of stenosis 40% in the RCA.  Patient has chest pain shortness of breath palpitations.  2. Marked lower extremity edema.  My concern is it is from the amlodipine.  Told patient to discontinue this follow-up with RENETTA Nielsen in about 2 weeks for reassessment.  3. Hypertension with the discontinuation will need to follow his blood pressure closely as also while on a relatively quick follow-up.  Patient did have a recent echo on 4/14/2021 ejection fraction was normal diastolic function was normal there really was no significant valvular abnormalities.        MEDICATIONS         Discharge Medications          Accurate as of May 25, 2021 11:59 PM. If you have any questions, ask your nurse or doctor.            Continue These Medications      Instructions Start Date   Aspir-Low 81 MG EC tablet  Generic drug: aspirin   81 mg, Oral, Nightly      atenolol 50 MG tablet  Commonly known as: TENORMIN   50 mg, Oral, Daily      furosemide 20 MG tablet  Commonly known as: LASIX   20 mg, Oral      lisinopril 40 MG tablet  Commonly known as: PRINIVIL,ZESTRIL   40 mg, Oral, Daily      potassium chloride 10 MEQ CR capsule  Commonly known as: MICRO-K   No dose, route, or frequency recorded.      pravastatin 40 MG tablet  Commonly known as: PRAVACHOL   40 mg, Oral, Nightly         Stop These Medications    NIFEdipine CC 60 MG 24 hr tablet  Commonly known as: ADALAT CC  Stopped by: Joshua Tinsley MD                **Qianon Disclaimer:   Much of this encounter note is an electronic transcription/translation of spoken language to printed text. The electronic  translation of spoken language may permit erroneous, or at times, nonsensical words or phrases to be inadvertently transcribed. Although I have reviewed the note for such errors, some may still exist.

## 2021-06-23 ENCOUNTER — OFFICE VISIT (OUTPATIENT)
Dept: CARDIOLOGY | Facility: CLINIC | Age: 76
End: 2021-06-23

## 2021-06-23 VITALS
SYSTOLIC BLOOD PRESSURE: 140 MMHG | WEIGHT: 315 LBS | OXYGEN SATURATION: 98 % | HEIGHT: 70 IN | DIASTOLIC BLOOD PRESSURE: 86 MMHG | HEART RATE: 68 BPM | BODY MASS INDEX: 45.1 KG/M2

## 2021-06-23 DIAGNOSIS — I10 ESSENTIAL HYPERTENSION: Primary | ICD-10-CM

## 2021-06-23 DIAGNOSIS — R60.0 PEDAL EDEMA: ICD-10-CM

## 2021-06-23 DIAGNOSIS — E78.5 HYPERLIPIDEMIA, UNSPECIFIED HYPERLIPIDEMIA TYPE: ICD-10-CM

## 2021-06-23 DIAGNOSIS — I25.10 CORONARY ARTERY DISEASE INVOLVING NATIVE CORONARY ARTERY OF NATIVE HEART WITHOUT ANGINA PECTORIS: ICD-10-CM

## 2021-06-23 PROCEDURE — 99214 OFFICE O/P EST MOD 30 MIN: CPT | Performed by: NURSE PRACTITIONER

## 2021-06-23 RX ORDER — FUROSEMIDE 40 MG/1
40 TABLET ORAL DAILY
Qty: 90 TABLET | Refills: 3 | Status: SHIPPED | OUTPATIENT
Start: 2021-06-23 | End: 2022-04-13

## 2021-06-23 NOTE — PROGRESS NOTES
"    CARDIOLOGY        Patient Name: Jake Alberts  :1945  Age: 75 y.o.  Primary Cardiologist: Joshua Tinsley MD  Encounter Provider:  RENETTA Salas    Date of Service: 21          CHIEF COMPLAINT / REASON FOR OFFICE VISIT     Coronary Artery Disease (3 wk f/u)      HISTORY OF PRESENT ILLNESS       HPI  Jake Alberts is a 75 y.o. male who presents today for 1 month reevaluation.     Pt has a  history significant for CAD, hypertension.    Patient was evaluated by Dr. Tinsley 1 month ago where he had lower extremity edema that thought was to be secondary from amlodipine.  At that time the amlodipine was discontinued and patient was to monitor blood pressure closely.    Patient states that since stopping the amlodipine the lower extremity edema has been drastically improved.  He states that his BP has been averaging in the 140s/upper 80s-90s.  Patient has felt well and is asymptomatic and denies any episodes of chest pain, shortness of breath, palpitations, lightheadedness, swelling or fatigue.      The following portions of the patient's history were reviewed and updated as appropriate: allergies, current medications, past family history, past medical history, past social history, past surgical history and problem list.      VITAL SIGNS     Visit Vitals  /86 (BP Location: Left arm, Patient Position: Sitting, Cuff Size: Large Adult)   Pulse 68   Ht 177.8 cm (70\")   Wt (!) 149 kg (328 lb)   SpO2 98%   BMI 47.06 kg/m²         Wt Readings from Last 3 Encounters:   21 (!) 149 kg (328 lb)   21 (!) 148 kg (327 lb)   21 (!) 147 kg (325 lb)     Body mass index is 47.06 kg/m².      REVIEW OF SYSTEMS   Review of Systems   Constitutional: Negative for malaise/fatigue.   Cardiovascular: Negative for chest pain and leg swelling.   Respiratory: Negative for shortness of breath.    Neurological: Negative for light-headedness.   All other systems reviewed and are " negative.          PHYSICAL EXAMINATION     Constitutional:       Appearance: Normal appearance. Well-developed.   Eyes:      Conjunctiva/sclera: Conjunctivae normal.   Neck:      Vascular: No carotid bruit.   Pulmonary:      Effort: Pulmonary effort is normal.      Breath sounds: Normal breath sounds.   Cardiovascular:      Normal rate. Regular rhythm. Normal S1. Normal S2.      Murmurs: There is no murmur.      No gallop. No click. No rub.   Edema:     Peripheral edema absent.   Musculoskeletal: Normal range of motion. Skin:     General: Skin is warm and dry.   Neurological:      Mental Status: Alert and oriented to person, place, and time.      GCS: GCS eye subscore is 4. GCS verbal subscore is 5. GCS motor subscore is 6.   Psychiatric:         Speech: Speech normal.         Behavior: Behavior normal.         Thought Content: Thought content normal.         Judgment: Judgment normal.           REVIEWED DATA     Procedures    Cardiac Procedures:  1. Cardiac catheterization 12/18/2018.  Left main diffuse 20% stenosis.  LAD 20% mid vessel stenosis.  Left circumflex 20-30% proximal stenosis.  RCA with tubular 40% mid vessel stenosis.  Normal LV size and systolic function.  Elevated LV end-diastolic pressure.  Recommend weight loss and medical management.  2. Echocardiogram 4/14/2021.  LVEF 56-60%.  Diastolic function normal.  Saline test results are negative.  Normal estimated RVSP.  3. Carotid ultrasound 4/14/2021.  Normal bilaterally.    Lipid Panel    Lipid Panel 4/14/21   Total Cholesterol 138   Triglycerides 109   HDL Cholesterol 43   VLDL Cholesterol 20   LDL Cholesterol  75   LDL/HDL Ratio 1.70               ASSESSMENT & PLAN      Diagnosis Plan   1. Essential hypertension  Basic Metabolic Panel   2. Pedal edema     3. Coronary artery disease involving native coronary artery of native heart without angina pectoris     4. Hyperlipidemia, unspecified hyperlipidemia type            SUMMARY/DISCUSSION  1. Hypertension.  Since stopping amlodipine patient's blood pressure has consistently been in the upper 130s-140s/upper 80s-90s.  I do think that we need slightly better blood pressure control.  Amlodipine caused lower extremity edema and this was subsequently stopped.  I have recommended that we continue atenolol 50 mg/day, lisinopril 40 mg/day.  Increase furosemide to 40 mg/day.  Patient will get repeat BMP in 2 weeks to assess renal function.  2. Lower extremity edema.  Patient experienced lower extremity edema with amlodipine.  This is now improved since stopping.  I have added this to his allergy list as an adverse effect.  3. CAD.  Currently denies any angina or dyspnea.  CAD was nonobstructive.  Continue medical therapy with aspirin, lisinopril, pravastatin.  4. Hyperlipidemia.  LDL goal less than 70.  LDL 75 4/14.  Continue pravastatin 40 mg/day.  5. Patient will continue to keep blood pressure log which I will review with him when I call results from BMP.  He will otherwise follow-up with Dr. Tinsley in 6 months.  Sooner for problems or complications.        MEDICATIONS         Discharge Medications          Accurate as of June 23, 2021 11:47 AM. If you have any questions, ask your nurse or doctor.            Continue These Medications      Instructions Start Date   Aspir-Low 81 MG EC tablet  Generic drug: aspirin   81 mg, Oral, Nightly      atenolol 50 MG tablet  Commonly known as: TENORMIN   50 mg, Oral, Daily      furosemide 20 MG tablet  Commonly known as: LASIX   20 mg, Oral      lisinopril 40 MG tablet  Commonly known as: PRINIVIL,ZESTRIL   40 mg, Oral, Daily      potassium chloride 10 MEQ CR capsule  Commonly known as: MICRO-K   No dose, route, or frequency recorded.      pravastatin 40 MG tablet  Commonly known as: PRAVACHOL   40 mg, Oral, Nightly                 **Dragon Disclaimer:   Much of this encounter note is an electronic transcription/translation of spoken  language to printed text. The electronic translation of spoken language may permit erroneous, or at times, nonsensical words or phrases to be inadvertently transcribed. Although I have reviewed the note for such errors, some may still exist.

## 2021-06-30 ENCOUNTER — LAB (OUTPATIENT)
Dept: LAB | Facility: HOSPITAL | Age: 76
End: 2021-06-30

## 2021-06-30 DIAGNOSIS — I10 ESSENTIAL HYPERTENSION: ICD-10-CM

## 2021-06-30 LAB
ANION GAP SERPL CALCULATED.3IONS-SCNC: 9.3 MMOL/L (ref 5–15)
BUN SERPL-MCNC: 21 MG/DL (ref 8–23)
BUN/CREAT SERPL: 17.4 (ref 7–25)
CALCIUM SPEC-SCNC: 8.9 MG/DL (ref 8.6–10.5)
CHLORIDE SERPL-SCNC: 103 MMOL/L (ref 98–107)
CO2 SERPL-SCNC: 29.7 MMOL/L (ref 22–29)
CREAT SERPL-MCNC: 1.21 MG/DL (ref 0.76–1.27)
GFR SERPL CREATININE-BSD FRML MDRD: 59 ML/MIN/1.73
GLUCOSE SERPL-MCNC: 108 MG/DL (ref 65–99)
POTASSIUM SERPL-SCNC: 3.5 MMOL/L (ref 3.5–5.2)
SODIUM SERPL-SCNC: 142 MMOL/L (ref 136–145)

## 2021-06-30 PROCEDURE — 80048 BASIC METABOLIC PNL TOTAL CA: CPT

## 2021-06-30 PROCEDURE — 36415 COLL VENOUS BLD VENIPUNCTURE: CPT

## 2022-01-12 ENCOUNTER — OFFICE VISIT (OUTPATIENT)
Dept: CARDIOLOGY | Facility: CLINIC | Age: 77
End: 2022-01-12

## 2022-01-12 VITALS
OXYGEN SATURATION: 98 % | WEIGHT: 315 LBS | BODY MASS INDEX: 45.1 KG/M2 | SYSTOLIC BLOOD PRESSURE: 160 MMHG | HEART RATE: 58 BPM | DIASTOLIC BLOOD PRESSURE: 100 MMHG | HEIGHT: 70 IN

## 2022-01-12 DIAGNOSIS — I10 ESSENTIAL HYPERTENSION: Chronic | ICD-10-CM

## 2022-01-12 DIAGNOSIS — I25.10 CORONARY ARTERY DISEASE INVOLVING NATIVE CORONARY ARTERY OF NATIVE HEART WITHOUT ANGINA PECTORIS: Primary | ICD-10-CM

## 2022-01-12 PROCEDURE — 99214 OFFICE O/P EST MOD 30 MIN: CPT | Performed by: INTERNAL MEDICINE

## 2022-01-13 NOTE — PROGRESS NOTES
"      CARDIOLOGY    Joshua Tinsley MD    ENCOUNTER DATE:  01/12/2022    Jake Alberts / 76 y.o. / male        CHIEF COMPLAINT / REASON FOR OFFICE VISIT     Essential hypertension (06/23/2021 Follow up)  Coronary artery disease    HISTORY OF PRESENT ILLNESS       HPI  Jake Alberts is a 76 y.o. male who presents today for reevaluation.  Overall he is actually doing well.  He did have 1 episode of chest pressure in the middle of the night lasting for about 5 minutes.  Besides that he denies any type of chest pain shortness of breath palpitations lightheadedness swelling or fatigue.      The following portions of the patient's history were reviewed and updated as appropriate: allergies, current medications, past family history, past medical history, past social history, past surgical history and problem list.      VITAL SIGNS     Visit Vitals  /100 (BP Location: Left arm)   Pulse 58   Ht 177.8 cm (70\")   Wt (!) 146 kg (322 lb)   SpO2 98%   BMI 46.20 kg/m²         Wt Readings from Last 3 Encounters:   01/12/22 (!) 146 kg (322 lb)   06/23/21 (!) 149 kg (328 lb)   05/25/21 (!) 148 kg (327 lb)     Body mass index is 46.2 kg/m².      REVIEW OF SYSTEMS   Review of Systems   All other systems reviewed and are negative.          PHYSICAL EXAMINATION     Vitals reviewed.   Constitutional:       Appearance: Healthy appearance.   Pulmonary:      Effort: Pulmonary effort is normal.   Cardiovascular:      Normal rate. Regular rhythm. Normal S1. Normal S2.      Murmurs: There is no murmur.      No gallop. No click. No rub.   Pulses:     Intact distal pulses.   Edema:     Peripheral edema absent.   Neurological:      Mental Status: Alert and oriented to person, place and time.           REVIEWED DATA     Procedures    Cardiac Procedures:  1.     Lipid Panel    Lipid Panel 4/14/21   Total Cholesterol 138   Triglycerides 109   HDL Cholesterol 43   VLDL Cholesterol 20   LDL Cholesterol  75   LDL/HDL Ratio 1.70       "         ASSESSMENT & PLAN      Diagnosis Plan   1. Coronary artery disease involving native coronary artery of native heart without angina pectoris     2. Essential hypertension           SUMMARY/DISCUSSION  1. Coronary artery disease.  Patient just had 1 episode of discomfort in the middle of the night lasting 5 minutes.  It was not with exertion and only occurred once.  At this point I would just follow it.  If it starts to recur more he was told to contact me and I would set him up for stress test.  2. Hypertension blood pressure is elevated.  He has been on salt by report.  Again I stressed the importance of minimizing his salt intake both for his coronary artery disease as well as for his hypertension.  Also encouraged to take his blood pressure more often.  3. Follow-up in 6 months sooner if he has issues.        MEDICATIONS         Discharge Medications          Accurate as of January 12, 2022 11:59 PM. If you have any questions, ask your nurse or doctor.            Continue These Medications      Instructions Start Date   Aspir-Low 81 MG EC tablet  Generic drug: aspirin   81 mg, Oral, Nightly      atenolol 50 MG tablet  Commonly known as: TENORMIN   50 mg, Oral, Daily      furosemide 40 MG tablet  Commonly known as: LASIX   40 mg, Oral, Daily      lisinopril 40 MG tablet  Commonly known as: PRINIVIL,ZESTRIL   40 mg, Oral, Daily      potassium chloride 10 MEQ CR capsule  Commonly known as: MICRO-K   No dose, route, or frequency recorded.      pravastatin 40 MG tablet  Commonly known as: PRAVACHOL   40 mg, Oral, Nightly                 **Dragon Disclaimer:   Much of this encounter note is an electronic transcription/translation of spoken language to printed text. The electronic translation of spoken language may permit erroneous, or at times, nonsensical words or phrases to be inadvertently transcribed. Although I have reviewed the note for such errors, some may still exist.

## 2022-04-13 RX ORDER — FUROSEMIDE 40 MG/1
TABLET ORAL
Qty: 90 TABLET | Refills: 3 | Status: SHIPPED | OUTPATIENT
Start: 2022-04-13 | End: 2022-09-08 | Stop reason: ALTCHOICE

## 2022-05-03 DIAGNOSIS — I25.118 CORONARY ARTERY DISEASE INVOLVING NATIVE CORONARY ARTERY OF NATIVE HEART WITH OTHER FORM OF ANGINA PECTORIS: Primary | ICD-10-CM

## 2022-08-31 ENCOUNTER — OFFICE VISIT (OUTPATIENT)
Dept: CARDIOLOGY | Facility: CLINIC | Age: 77
End: 2022-08-31

## 2022-08-31 VITALS
HEIGHT: 70 IN | HEART RATE: 85 BPM | BODY MASS INDEX: 45.1 KG/M2 | SYSTOLIC BLOOD PRESSURE: 190 MMHG | WEIGHT: 315 LBS | DIASTOLIC BLOOD PRESSURE: 110 MMHG

## 2022-08-31 DIAGNOSIS — I25.10 CORONARY ARTERY DISEASE INVOLVING NATIVE CORONARY ARTERY OF NATIVE HEART WITHOUT ANGINA PECTORIS: Primary | ICD-10-CM

## 2022-08-31 DIAGNOSIS — I10 ESSENTIAL HYPERTENSION: Chronic | ICD-10-CM

## 2022-08-31 PROCEDURE — 93000 ELECTROCARDIOGRAM COMPLETE: CPT | Performed by: INTERNAL MEDICINE

## 2022-08-31 PROCEDURE — 99214 OFFICE O/P EST MOD 30 MIN: CPT | Performed by: INTERNAL MEDICINE

## 2022-08-31 RX ORDER — AMPICILLIN TRIHYDRATE 250 MG
CAPSULE ORAL
COMMUNITY

## 2022-08-31 RX ORDER — BUDESONIDE, GLYCOPYRROLATE, AND FORMOTEROL FUMARATE 160; 9; 4.8 UG/1; UG/1; UG/1
2 AEROSOL, METERED RESPIRATORY (INHALATION) 2 TIMES DAILY
COMMUNITY

## 2022-08-31 NOTE — PROGRESS NOTES
"      CARDIOLOGY    Joshua Tinsley MD    ENCOUNTER DATE:  08/31/2022    Jake Alberts / 76 y.o. / male        CHIEF COMPLAINT / REASON FOR OFFICE VISIT     Coronary Artery Disease (01/12/2022 Follow up)  Hypertension  Diastolic heart failure    HISTORY OF PRESENT ILLNESS       HPI  Jake Alberts is a 76 y.o. male who presents today for reevaluation.  Clinically patient says he is fine he is little bit short of breath with exertion.  His blood pressure is markedly elevated today.  He went on to say he did not take his medications this morning and he actually took the wrong medications yesterday which is probably contributing factor.  Also he continues to eat salty type foods including chips all the time.  Patient denies any types of endorgan damage today like headaches blurry vision chest pain shortness of breath.  He did also admitted to me that his blood pressures been running about 190/100 in general.      The following portions of the patient's history were reviewed and updated as appropriate: allergies, current medications, past family history, past medical history, past social history, past surgical history and problem list.      VITAL SIGNS     Visit Vitals  BP (!) 190/110 (BP Location: Left arm)   Pulse 85   Ht 177.8 cm (70\")   Wt (!) 146 kg (321 lb)   BMI 46.06 kg/m²         Wt Readings from Last 3 Encounters:   08/31/22 (!) 146 kg (321 lb)   01/12/22 (!) 146 kg (322 lb)   06/23/21 (!) 149 kg (328 lb)     Body mass index is 46.06 kg/m².      REVIEW OF SYSTEMS   ROS        PHYSICAL EXAMINATION     Vitals reviewed.   Constitutional:       Appearance: Healthy appearance.   Pulmonary:      Effort: Pulmonary effort is normal.   Cardiovascular:      Normal rate. Regular rhythm. Normal S1. Normal S2.      Murmurs: There is no murmur.      No gallop. No click. No rub.   Pulses:     Intact distal pulses.   Edema:     Peripheral edema absent.   Neurological:      Mental Status: Alert and oriented to person, " place and time.           REVIEWED DATA       ECG 12 Lead    Date/Time: 8/31/2022 11:18 AM  Performed by: Joshua Tinsley MD  Authorized by: Joshua Tinsley MD   Comparison: compared with previous ECG from 4/13/2021  Similar to previous ECG  Rhythm: sinus rhythm  Other findings: non-specific ST-T wave changes and poor R wave progression    Clinical impression: abnormal EKG            Cardiac Procedures:  12/18/18  Conclusions:   1. Left main: Diffuse 20% stenosis  2. LAD: 20% mid vessel stenosis  3. LCX: 20-30% proximal stenosis  4. RCA: Tubular 40% mid vessel stenosis  5.  Normal left ventricular size and systolic function  6.  Elevated left ventricular end-diastolic pressure.  1.           ASSESSMENT & PLAN      Diagnosis Plan   1. Coronary artery disease involving native coronary artery of native heart without angina pectoris     2. Essential hypertension           SUMMARY/DISCUSSION  1. Coronary disease asymptomatic.  2. Hypertension obviously this is a huge issue.  I told him he is in stroke territory but clinically he is currently not having any endorgan symptoms.  Patient had a CT scan done and was found to have a dilated aortic root of 4.1 cm.  I did tell him his blood pressures and stroke territory and really emphasized this.  I would change his lisinopril over to Edarbi.  I gave him samples of 80 mg of Edarbi patient can follow-up with nurse practitioner in a week.  Told him if he has any signs of headaches or any other issues he needs to go emergency room immediately or contact my office.  3. Mild dilated aortic root at 4.1 cm by CT scan.  This is not a terrible surprise considering his blood pressures not been well controlled.  I once again emphasized the problem with this and the concern.  4. Follow-up 1 week sooner if issues.        MEDICATIONS         Discharge Medications          Accurate as of August 31, 2022 11:05 AM. If you have any questions, ask your nurse or doctor.            New  Medications      Instructions Start Date   azilsartan medoxomil 80 MG tablet tablet  Commonly known as: Edarbi  Started by: Joshua Tinsley MD   80 mg, Oral, Daily         Continue These Medications      Instructions Start Date   Aspir-Low 81 MG EC tablet  Generic drug: aspirin   81 mg, Oral, Nightly      atenolol 50 MG tablet  Commonly known as: TENORMIN   50 mg, Oral, Daily      Breztri Aerosphere 160-9-4.8 MCG/ACT aerosol inhaler  Generic drug: Budeson-Glycopyrrol-Formoterol   2 puffs, Inhalation, 2 Times Daily      CoQ10 200 MG capsule   Oral      furosemide 40 MG tablet  Commonly known as: LASIX   TAKE 1 TABLET DAILY      potassium chloride 10 MEQ CR capsule  Commonly known as: MICRO-K   2 Times Daily      pravastatin 40 MG tablet  Commonly known as: PRAVACHOL   40 mg, Oral, Nightly         Stop These Medications    lisinopril 40 MG tablet  Commonly known as: PRINIVIL,ZESTRIL  Stopped by: Joshua Tinsley MD                **Dragon Disclaimer:   Much of this encounter note is an electronic transcription/translation of spoken language to printed text. The electronic translation of spoken language may permit erroneous, or at times, nonsensical words or phrases to be inadvertently transcribed. Although I have reviewed the note for such errors, some may still exist.

## 2022-09-08 ENCOUNTER — LAB (OUTPATIENT)
Dept: LAB | Facility: HOSPITAL | Age: 77
End: 2022-09-08

## 2022-09-08 ENCOUNTER — OFFICE VISIT (OUTPATIENT)
Dept: CARDIOLOGY | Facility: CLINIC | Age: 77
End: 2022-09-08

## 2022-09-08 VITALS
HEIGHT: 70 IN | BODY MASS INDEX: 45.1 KG/M2 | WEIGHT: 315 LBS | OXYGEN SATURATION: 96 % | DIASTOLIC BLOOD PRESSURE: 108 MMHG | SYSTOLIC BLOOD PRESSURE: 186 MMHG | HEART RATE: 72 BPM

## 2022-09-08 DIAGNOSIS — I10 ESSENTIAL HYPERTENSION: ICD-10-CM

## 2022-09-08 DIAGNOSIS — E78.5 HYPERLIPIDEMIA, UNSPECIFIED HYPERLIPIDEMIA TYPE: ICD-10-CM

## 2022-09-08 DIAGNOSIS — I25.118 CORONARY ARTERY DISEASE INVOLVING NATIVE CORONARY ARTERY OF NATIVE HEART WITH OTHER FORM OF ANGINA PECTORIS: ICD-10-CM

## 2022-09-08 DIAGNOSIS — I10 ESSENTIAL HYPERTENSION: Primary | ICD-10-CM

## 2022-09-08 PROCEDURE — 99214 OFFICE O/P EST MOD 30 MIN: CPT | Performed by: NURSE PRACTITIONER

## 2022-09-08 PROCEDURE — 36415 COLL VENOUS BLD VENIPUNCTURE: CPT

## 2022-09-08 RX ORDER — SPIRONOLACTONE 50 MG/1
50 TABLET, FILM COATED ORAL DAILY
Qty: 60 TABLET | Refills: 11 | Status: SHIPPED | OUTPATIENT
Start: 2022-09-08 | End: 2022-09-12 | Stop reason: SDUPTHER

## 2022-09-08 RX ORDER — CARVEDILOL 25 MG/1
25 TABLET ORAL 2 TIMES DAILY
Qty: 60 TABLET | Refills: 11 | Status: SHIPPED | OUTPATIENT
Start: 2022-09-08 | End: 2022-09-12 | Stop reason: SDUPTHER

## 2022-09-08 NOTE — PROGRESS NOTES
CARDIOLOGY        Patient Name: Jake Alberts  :1945  Age: 76 y.o.  Primary Cardiologist: Joshua Tinsley MD  Encounter Provider:  RENETTA Salas    Date of Service: 21          CHIEF COMPLAINT / REASON FOR OFFICE VISIT     Coronary Artery Disease (1 wk f/u)      HISTORY OF PRESENT ILLNESS       Coronary Artery Disease  Pertinent negatives include no chest pain, leg swelling, shortness of breath or weight gain.     Jake Alberts is a 76 y.o. male who presents today for 1 week reevaluation.     Pt has a  history significant for CAD, hypertension.    Patient was evaluated by Dr. Tinsley last month where he was found to have extremely elevated blood pressure readings.  At that time he was transitioned from lisinopril to Edarbi.    Since starting Edarbi, unfortunately patient's blood pressure has remained elevated.  He brings with him a diary where blood pressures averaging in the 180s/110s.  Patient states that he has had a mild headache but attributes this to sinus and nasal congestion; he is not taking any decongestant.  Denies any chest pain, dyspnea at rest or with exertion, palpitations, lightheadedness.  He does have lower extremity edema as well as fatigue.  In the past patient was on amlodipine however this was discontinued secondary to lower extremity edema.  He has difficulty with diuretics as he has prostate issues.  He does have diagnosed sleep apnea but cannot utilize a CPAP device as his wife has dementia and is legally blind and he is often having to get up with her every 30 to 45 minutes throughout the night.    The following portions of the patient's history were reviewed and updated as appropriate: allergies, current medications, past family history, past medical history, past social history, past surgical history and problem list.      VITAL SIGNS     Visit Vitals  BP (!) 186/108 (BP Location: Left arm, Patient Position: Sitting, Cuff Size: Large Adult)   Pulse 72  "  Ht 177.8 cm (70\")   Wt (!) 146 kg (321 lb 9.6 oz)   SpO2 96%   BMI 46.14 kg/m²         Wt Readings from Last 3 Encounters:   09/08/22 (!) 146 kg (321 lb 9.6 oz)   08/31/22 (!) 146 kg (321 lb)   01/12/22 (!) 146 kg (322 lb)     Body mass index is 46.14 kg/m².      REVIEW OF SYSTEMS   Review of Systems   Constitutional: Negative for chills, fever, malaise/fatigue, weight gain and weight loss.   HENT: Positive for congestion.    Cardiovascular: Negative for chest pain and leg swelling.   Respiratory: Negative for cough, shortness of breath, snoring and wheezing.    Hematologic/Lymphatic: Negative for bleeding problem. Does not bruise/bleed easily.   Skin: Negative for color change.   Musculoskeletal: Negative for falls, joint pain and myalgias.   Gastrointestinal: Negative for melena.   Genitourinary: Negative for hematuria.   Neurological: Positive for headaches. Negative for excessive daytime sleepiness and light-headedness.   Psychiatric/Behavioral: Negative for depression. The patient is not nervous/anxious.    All other systems reviewed and are negative.          PHYSICAL EXAMINATION     Constitutional:       Appearance: Normal appearance. Well-developed.   Eyes:      Conjunctiva/sclera: Conjunctivae normal.   Neck:      Vascular: No carotid bruit.   Pulmonary:      Effort: Pulmonary effort is normal.      Breath sounds: Normal breath sounds.   Cardiovascular:      Normal rate. Regular rhythm. Normal S1. Normal S2.      Murmurs: There is no murmur.      No gallop. No click. No rub.   Edema:     Peripheral edema absent.   Musculoskeletal: Normal range of motion. Skin:     General: Skin is warm and dry.   Neurological:      Mental Status: Alert and oriented to person, place, and time.      GCS: GCS eye subscore is 4. GCS verbal subscore is 5. GCS motor subscore is 6.   Psychiatric:         Speech: Speech normal.         Behavior: Behavior normal.         Thought Content: Thought content normal.         Judgment: " Judgment normal.           REVIEWED DATA     Procedures    Cardiac Procedures:  1. Cardiac catheterization 12/18/2018.  Left main diffuse 20% stenosis.  LAD 20% mid vessel stenosis.  Left circumflex 20-30% proximal stenosis.  RCA with tubular 40% mid vessel stenosis.  Normal LV size and systolic function.  Elevated LV end-diastolic pressure.  Recommend weight loss and medical management.  2. Echocardiogram 4/14/2021.  LVEF 56-60%.  Diastolic function normal.  Saline test results are negative.  Normal estimated RVSP.  3. Carotid ultrasound 4/14/2021.  Normal bilaterally.          ASSESSMENT & PLAN     Diagnoses and all orders for this visit:    1. 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  States that he will try to use a CPAP if he knows that he is getting longer burst of sleep.  · Patient has lower extremity edema with calcium channel blockers in the past  -     Basic Metabolic Panel; Future  -     Basic Metabolic Panel; Future    2. Coronary artery disease involving native coronary artery of native heart with other form of angina pectoris (HCC)  · Denies angina or dyspnea  · Continue pravastatin, start carvedilol as above    3. Hyperlipidemia, unspecified hyperlipidemia type  · Continue pravastatin    Other orders  -     carvedilol (COREG) 25 MG tablet; Take 1 tablet by mouth 2 (Two) Times a Day.  Dispense: 60 tablet; Refill: 11  -     spironolactone (ALDACTONE) 50 MG tablet; Take 1 tablet by mouth  Daily.  Dispense: 60 tablet; Refill: 11        Return in about 2 weeks (around 9/22/2022) for RENETTA Nielsen.    Future Appointments       Provider Department Center    9/22/2022 11:20 AM Ana Elmore APRN [facility] CARDIOLOGY [facility]              MEDICATIONS         Discharge Medications          Accurate as of September 8, 2022 11:44 AM. If you have any questions, ask your nurse or doctor.            Continue These Medications      Instructions Start Date   Aspir-Low 81 MG EC tablet  Generic drug: aspirin   81 mg, Oral, Nightly      atenolol 50 MG tablet  Commonly known as: TENORMIN   50 mg, Oral, Daily      azilsartan medoxomil 80 MG tablet tablet  Commonly known as: Edarbi   80 mg, Oral, Daily      Breztri Aerosphere 160-9-4.8 MCG/ACT aerosol inhaler  Generic drug: Budeson-Glycopyrrol-Formoterol   2 puffs, Inhalation, 2 Times Daily      CoQ10 200 MG capsule   Oral      furosemide 40 MG tablet  Commonly known as: LASIX   TAKE 1 TABLET DAILY      potassium chloride 10 MEQ CR capsule  Commonly known as: MICRO-K   2 Times Daily      pravastatin 40 MG tablet  Commonly known as: PRAVACHOL   40 mg, Oral, Nightly                 **Dragon Disclaimer:   Much of this encounter note is an electronic transcription/translation of spoken language to printed text. The electronic translation of spoken language may permit erroneous, or at times, nonsensical words or phrases to be inadvertently transcribed. Although I have reviewed the note for such errors, some may still exist.

## 2022-09-12 RX ORDER — SPIRONOLACTONE 50 MG/1
50 TABLET, FILM COATED ORAL DAILY
Qty: 30 TABLET | Refills: 0 | Status: SHIPPED | OUTPATIENT
Start: 2022-09-12 | End: 2022-09-22 | Stop reason: SDUPTHER

## 2022-09-12 RX ORDER — CARVEDILOL 25 MG/1
25 TABLET ORAL 2 TIMES DAILY
Qty: 30 TABLET | Refills: 0 | Status: SHIPPED | OUTPATIENT
Start: 2022-09-12 | End: 2022-09-22 | Stop reason: SDUPTHER

## 2022-09-22 ENCOUNTER — OFFICE VISIT (OUTPATIENT)
Dept: CARDIOLOGY | Facility: CLINIC | Age: 77
End: 2022-09-22

## 2022-09-22 VITALS
WEIGHT: 315 LBS | OXYGEN SATURATION: 96 % | SYSTOLIC BLOOD PRESSURE: 146 MMHG | BODY MASS INDEX: 45.1 KG/M2 | HEART RATE: 93 BPM | DIASTOLIC BLOOD PRESSURE: 94 MMHG | HEIGHT: 70 IN

## 2022-09-22 DIAGNOSIS — I25.118 CORONARY ARTERY DISEASE INVOLVING NATIVE CORONARY ARTERY OF NATIVE HEART WITH OTHER FORM OF ANGINA PECTORIS: Primary | ICD-10-CM

## 2022-09-22 DIAGNOSIS — E78.5 HYPERLIPIDEMIA, UNSPECIFIED HYPERLIPIDEMIA TYPE: ICD-10-CM

## 2022-09-22 DIAGNOSIS — I10 ESSENTIAL HYPERTENSION: ICD-10-CM

## 2022-09-22 PROCEDURE — 99214 OFFICE O/P EST MOD 30 MIN: CPT | Performed by: NURSE PRACTITIONER

## 2022-09-22 RX ORDER — CARVEDILOL 25 MG/1
25 TABLET ORAL 2 TIMES DAILY
Qty: 90 TABLET | Refills: 3 | Status: SHIPPED | OUTPATIENT
Start: 2022-09-22

## 2022-09-22 RX ORDER — SPIRONOLACTONE 50 MG/1
50 TABLET, FILM COATED ORAL DAILY
Qty: 90 TABLET | Refills: 3 | Status: SHIPPED | OUTPATIENT
Start: 2022-09-22

## 2022-09-22 NOTE — PROGRESS NOTES
CARDIOLOGY        Patient Name: Jake Alberts  :1945  Age: 76 y.o.  Primary Cardiologist: Joshua Tinsley MD  Encounter Provider:  RENETTA Salas    Date of Service: 21          CHIEF COMPLAINT / REASON FOR OFFICE VISIT     Hypertension (2 wk f/u)      HISTORY OF PRESENT ILLNESS       Coronary Artery Disease  Pertinent negatives include no chest pain, leg swelling, shortness of breath or weight gain.   Hypertension  Pertinent negatives include no chest pain, headaches, malaise/fatigue or shortness of breath.     Jake Alberts is a 76 y.o. male who presents today for 1 week reevaluation.     Pt has a  history significant for CAD, hypertension.    Patient was evaluated by Dr. Tinsley last month where he was found to have extremely elevated blood pressure readings.  At that time he was transitioned from lisinopril to Edarbi.    Patient was evaluated by me 2 weeks ago and atenolol was transitioned to carvedilol and spironolactone was added.    Since last assessment patient's blood pressure has improved but is still not at goal.  Unfortunately, patient got confused on medication directions and has not been taking Edarbi.  He has been taking carvedilol and spironolactone.  Blood pressure at home has been averaging in the low 150s/100s.  Patient's cuff was checked today and his diastolic is approximately 10 points higher than our manual blood pressure.  Patient did try to get a BMP drawn last week but unfortunately it hemolyzed.  He has an upcoming appointment with his PCP on 10/5/2022.  Currently denies chest discomfort, dyspnea, palpitations, lightheadedness, edema, fatigue.    The following portions of the patient's history were reviewed and updated as appropriate: allergies, current medications, past family history, past medical history, past social history, past surgical history and problem list.      VITAL SIGNS     Visit Vitals  /94 (BP Location: Left arm, Patient Position:  "Sitting, Cuff Size: Large Adult)   Pulse 93   Ht 177.8 cm (70\")   Wt (!) 147 kg (324 lb 6.4 oz)   SpO2 96%   BMI 46.55 kg/m²         Wt Readings from Last 3 Encounters:   09/22/22 (!) 147 kg (324 lb 6.4 oz)   09/08/22 (!) 146 kg (321 lb 9.6 oz)   08/31/22 (!) 146 kg (321 lb)     Body mass index is 46.55 kg/m².      REVIEW OF SYSTEMS   Review of Systems   Constitutional: Negative for chills, fever, malaise/fatigue, weight gain and weight loss.   HENT: Negative for congestion.    Cardiovascular: Negative for chest pain and leg swelling.   Respiratory: Negative for cough, shortness of breath, snoring and wheezing.    Hematologic/Lymphatic: Negative for bleeding problem. Does not bruise/bleed easily.   Skin: Negative for color change.   Musculoskeletal: Negative for falls, joint pain and myalgias.   Gastrointestinal: Negative for melena.   Genitourinary: Negative for hematuria.   Neurological: Negative for excessive daytime sleepiness, headaches and light-headedness.   Psychiatric/Behavioral: Negative for depression. The patient is not nervous/anxious.    All other systems reviewed and are negative.          PHYSICAL EXAMINATION     Constitutional:       Appearance: Normal appearance. Well-developed.   Eyes:      Conjunctiva/sclera: Conjunctivae normal.   Neck:      Vascular: No carotid bruit.   Pulmonary:      Effort: Pulmonary effort is normal.      Breath sounds: Normal breath sounds.   Cardiovascular:      Normal rate. Regular rhythm. Normal S1. Normal S2.      Murmurs: There is no murmur.      No gallop. No click. No rub.   Edema:     Peripheral edema absent.   Musculoskeletal: Normal range of motion. Skin:     General: Skin is warm and dry.   Neurological:      Mental Status: Alert and oriented to person, place, and time.      GCS: GCS eye subscore is 4. GCS verbal subscore is 5. GCS motor subscore is 6.   Psychiatric:         Speech: Speech normal.         Behavior: Behavior normal.         Thought Content: " Thought content normal.         Judgment: Judgment normal.           REVIEWED DATA     Procedures    Cardiac Procedures:  1. Cardiac catheterization 12/18/2018.  Left main diffuse 20% stenosis.  LAD 20% mid vessel stenosis.  Left circumflex 20-30% proximal stenosis.  RCA with tubular 40% mid vessel stenosis.  Normal LV size and systolic function.  Elevated LV end-diastolic pressure.  Recommend weight loss and medical management.  2. Echocardiogram 4/14/2021.  LVEF 56-60%.  Diastolic function normal.  Saline test results are negative.  Normal estimated RVSP.  3. Carotid ultrasound 4/14/2021.  Normal bilaterally.          ASSESSMENT & PLAN     Diagnoses and all orders for this visit:    1. Coronary artery disease involving native coronary artery of native heart with other form of angina pectoris (HCC) (Primary)  · Currently denies angina or dyspnea  · Continue carvedilol, aspirin, pravastatin    2. Essential hypertension  · Blood pressure has drastically improved compared to where we were in the beginning  · Still remains slightly elevated in the upper 140s-150s  · Continue carvedilol 25 mg twice a day, spironolactone 50 mg/day.  Patient stopped taking Edarbi when we transition to carvedilol and spironolactone.  Instructions were misinterpreted.  He will start Edarbi 40 mg/day and monitor blood pressure.  He is aware that if blood pressure still not at goal in the 120s-130s after approximately 1 week to increase Edarbi to 80 mg/day  · He will get repeat BMP at his PCP follow-up appointment on 10/5 and results will be faxed  · Patient will call the office and follow-up with me in approximately 3 weeks to let me know which dose of Edarbi he is tolerating and give blood pressure readings    3. Hyperlipidemia, unspecified hyperlipidemia type  · Continue pravastatin    Other orders  -     carvedilol (COREG) 25 MG tablet; Take 1 tablet by mouth 2 (Two) Times a Day.  Dispense: 90 tablet; Refill: 3  -     spironolactone  (ALDACTONE) 50 MG tablet; Take 1 tablet by mouth Daily.  Dispense: 90 tablet; Refill: 3  -     azilsartan medoxomil (Edarbi) 80 MG tablet tablet; Take 0.5 tablets by mouth Daily.  Dispense: 30 tablet; Refill: 0        Return in about 4 months (around 1/22/2023) for Dr. Tinsley- Routine.    Future Appointments       Provider Department Center    1/4/2023 8:00 AM Joshua Tinsley MD [facility] CARDIOLOGY [facility]              MEDICATIONS         Discharge Medications          Accurate as of September 22, 2022 11:43 AM. If you have any questions, ask your nurse or doctor.            Changes to Medications      Instructions Start Date   azilsartan medoxomil 80 MG tablet tablet  Commonly known as: Edarbi  What changed: how much to take  Changed by: Ana Elmore, APRN   40 mg, Oral, Daily         Continue These Medications      Instructions Start Date   Aspir-Low 81 MG EC tablet  Generic drug: aspirin   81 mg, Oral, Nightly      Breztri Aerosphere 160-9-4.8 MCG/ACT aerosol inhaler  Generic drug: Budeson-Glycopyrrol-Formoterol   2 puffs, Inhalation, 2 Times Daily      carvedilol 25 MG tablet  Commonly known as: COREG   25 mg, Oral, 2 Times Daily      CoQ10 200 MG capsule   Oral      pravastatin 40 MG tablet  Commonly known as: PRAVACHOL   40 mg, Oral, Nightly      spironolactone 50 MG tablet  Commonly known as: ALDACTONE   50 mg, Oral, Daily                 **Dragon Disclaimer:   Much of this encounter note is an electronic transcription/translation of spoken language to printed text. The electronic translation of spoken language may permit erroneous, or at times, nonsensical words or phrases to be inadvertently transcribed. Although I have reviewed the note for such errors, some may still exist.

## 2023-01-04 ENCOUNTER — OFFICE VISIT (OUTPATIENT)
Dept: CARDIOLOGY | Facility: CLINIC | Age: 78
End: 2023-01-04
Payer: MEDICARE

## 2023-01-04 VITALS
OXYGEN SATURATION: 98 % | HEIGHT: 70 IN | WEIGHT: 315 LBS | HEART RATE: 88 BPM | DIASTOLIC BLOOD PRESSURE: 88 MMHG | SYSTOLIC BLOOD PRESSURE: 140 MMHG | BODY MASS INDEX: 45.1 KG/M2

## 2023-01-04 DIAGNOSIS — I10 ESSENTIAL HYPERTENSION: Primary | Chronic | ICD-10-CM

## 2023-01-04 DIAGNOSIS — I25.10 CORONARY ARTERY DISEASE INVOLVING NATIVE CORONARY ARTERY OF NATIVE HEART WITHOUT ANGINA PECTORIS: ICD-10-CM

## 2023-01-04 PROCEDURE — 1159F MED LIST DOCD IN RCRD: CPT | Performed by: INTERNAL MEDICINE

## 2023-01-04 PROCEDURE — 1160F RVW MEDS BY RX/DR IN RCRD: CPT | Performed by: INTERNAL MEDICINE

## 2023-01-04 PROCEDURE — 99214 OFFICE O/P EST MOD 30 MIN: CPT | Performed by: INTERNAL MEDICINE

## 2023-01-04 RX ORDER — HYDROCHLOROTHIAZIDE 12.5 MG/1
12.5 CAPSULE, GELATIN COATED ORAL DAILY
COMMUNITY
Start: 2022-11-09 | End: 2023-01-04

## 2023-01-04 RX ORDER — OLMESARTAN MEDOXOMIL 40 MG/1
40 TABLET ORAL DAILY
COMMUNITY
Start: 2022-10-15 | End: 2023-01-04

## 2023-01-04 NOTE — PROGRESS NOTES
CARDIOLOGY    Joshua Tinsley MD    ENCOUNTER DATE:  01/04/2023    Jake Alberts / 77 y.o. / male        CHIEF COMPLAINT / REASON FOR OFFICE VISIT     Coronary Artery Disease (09/22/2022)  Hypertension    HISTORY OF PRESENT ILLNESS       HPI  Jake Alberts is a 77 y.o. male who presents today for reevaluation.  Patient has history of coronary artery disease as well as hypertension.  Patient was placed on Edarbi which actually worked very nicely for the patient.  It lowered his blood pressure but then there was an issue with his insurance and he subsequently was changed back to a ARB.  With this his blood pressure went back up.  He was also placed on hydrochlorothiazide in addition to spironolactone by his primary provider.  Clinically patient is doing well no chest pain shortness of breath palpitations lightheadedness swelling or fatigue.      The following portions of the patient's history were reviewed and updated as appropriate: allergies, current medications, past family history, past medical history, past social history, past surgical history and problem list.      VITAL SIGNS     Visit Vitals  /88 (BP Location: Left arm)   Pulse 88   Ht 177.8 cm (70\")   Wt (!) 148 kg (326 lb)   SpO2 98%   BMI 46.78 kg/m²         Wt Readings from Last 3 Encounters:   01/04/23 (!) 148 kg (326 lb)   09/22/22 (!) 147 kg (324 lb 6.4 oz)   09/08/22 (!) 146 kg (321 lb 9.6 oz)     Body mass index is 46.78 kg/m².      REVIEW OF SYSTEMS   ROS        PHYSICAL EXAMINATION     Vitals reviewed.   Constitutional:       Appearance: Healthy appearance.   Pulmonary:      Effort: Pulmonary effort is normal.   Cardiovascular:      Normal rate. Regular rhythm. Normal S1. Normal S2.      Murmurs: There is no murmur.      No gallop. No click. No rub.   Pulses:     Intact distal pulses.   Edema:     Peripheral edema absent.   Neurological:      Mental Status: Alert and oriented to person, place and time.           REVIEWED DATA      Procedures    Cardiac Procedures:  1.           ASSESSMENT & PLAN      Diagnosis Plan   1. Essential hypertension        2. Coronary artery disease involving native coronary artery of native heart without angina pectoris              SUMMARY/DISCUSSION  1. Hypertension.  Patient nicely responded to Edarbi it brought his blood pressure down in the 120s to 130 range.  Unfortunately we had issues with insurance and he was changed back which clearly is not working for him.  He has been on an ACE inhibitor and an ARB with an adequate response.  I placed him back on Edarbi and we will work on prior authorization if necessary.  In addition he is also placed on hydrochlorothiazide.  This is redundant in the setting of his spironolactone and the fact that he is on an ARB I am going to hold off on this and stop his hydrochlorothiazide.  We will see him back in about 2 to 3 months for reassessment.  2. Coronary artery disease stable no symptoms  3. Follow-up 3 - 6 months sooner if his blood pressure does not improve back on the Edarbi.        MEDICATIONS         Discharge Medications          Accurate as of January 4, 2023  3:27 PM. If you have any questions, ask your nurse or doctor.            Continue These Medications      Instructions Start Date   Aspir-Low 81 MG EC tablet  Generic drug: aspirin   81 mg, Oral, Nightly      azilsartan medoxomil 80 MG tablet tablet  Commonly known as: Edarbi   80 mg, Oral, Daily      Breztri Aerosphere 160-9-4.8 MCG/ACT aerosol inhaler  Generic drug: Budeson-Glycopyrrol-Formoterol   2 puffs, Inhalation, 2 Times Daily      carvedilol 25 MG tablet  Commonly known as: COREG   25 mg, Oral, 2 Times Daily      CoQ10 200 MG capsule   Oral      pravastatin 40 MG tablet  Commonly known as: PRAVACHOL   40 mg, Oral, Nightly      spironolactone 50 MG tablet  Commonly known as: ALDACTONE   50 mg, Oral, Daily         Stop These Medications    hydroCHLOROthiazide 12.5 MG capsule  Commonly known as:  MICROZIDE  Stopped by: Joshua Tinsley MD     olmesartan 40 MG tablet  Commonly known as: BENICAR  Stopped by: Joshua Tinsley MD                **Dragon Disclaimer:   Much of this encounter note is an electronic transcription/translation of spoken language to printed text. The electronic translation of spoken language may permit erroneous, or at times, nonsensical words or phrases to be inadvertently transcribed. Although I have reviewed the note for such errors, some may still exist.

## 2023-04-06 PROCEDURE — 87077 CULTURE AEROBIC IDENTIFY: CPT | Performed by: FAMILY MEDICINE

## 2023-04-06 PROCEDURE — 87186 SC STD MICRODIL/AGAR DIL: CPT | Performed by: FAMILY MEDICINE

## 2023-04-06 PROCEDURE — 87086 URINE CULTURE/COLONY COUNT: CPT | Performed by: FAMILY MEDICINE

## 2023-10-12 RX ORDER — SPIRONOLACTONE 50 MG/1
50 TABLET, FILM COATED ORAL DAILY
Qty: 90 TABLET | Refills: 0 | Status: SHIPPED | OUTPATIENT
Start: 2023-10-12

## 2023-12-20 RX ORDER — AZILSARTAN KAMEDOXOMIL 80 MG/1
80 TABLET ORAL DAILY
Qty: 90 TABLET | Refills: 0 | Status: SHIPPED | OUTPATIENT
Start: 2023-12-20

## 2023-12-20 RX ORDER — SPIRONOLACTONE 50 MG/1
50 TABLET, FILM COATED ORAL DAILY
Qty: 90 TABLET | Refills: 3 | Status: SHIPPED | OUTPATIENT
Start: 2023-12-20

## 2023-12-20 NOTE — TELEPHONE ENCOUNTER
Please find out if he has had lab work recently, specifically a metabolic panel.  If not, he will need one.

## 2024-03-25 RX ORDER — AZILSARTAN KAMEDOXOMIL 80 MG/1
80 TABLET ORAL DAILY
Qty: 90 TABLET | Refills: 3 | Status: SHIPPED | OUTPATIENT
Start: 2024-03-25

## 2024-07-29 RX ORDER — CARVEDILOL 25 MG/1
TABLET ORAL
Qty: 180 TABLET | Refills: 3 | Status: SHIPPED | OUTPATIENT
Start: 2024-07-29

## 2024-09-03 ENCOUNTER — OFFICE VISIT (OUTPATIENT)
Dept: CARDIOLOGY | Facility: CLINIC | Age: 79
End: 2024-09-03
Payer: MEDICARE

## 2024-09-03 VITALS
HEART RATE: 59 BPM | HEIGHT: 70 IN | WEIGHT: 315 LBS | BODY MASS INDEX: 45.1 KG/M2 | OXYGEN SATURATION: 97 % | DIASTOLIC BLOOD PRESSURE: 80 MMHG | SYSTOLIC BLOOD PRESSURE: 120 MMHG

## 2024-09-03 DIAGNOSIS — R07.2 PRECORDIAL PAIN: ICD-10-CM

## 2024-09-03 DIAGNOSIS — R06.09 DOE (DYSPNEA ON EXERTION): ICD-10-CM

## 2024-09-03 DIAGNOSIS — I10 ESSENTIAL HYPERTENSION: Primary | Chronic | ICD-10-CM

## 2024-09-03 PROCEDURE — 3079F DIAST BP 80-89 MM HG: CPT | Performed by: NURSE PRACTITIONER

## 2024-09-03 PROCEDURE — 93000 ELECTROCARDIOGRAM COMPLETE: CPT | Performed by: NURSE PRACTITIONER

## 2024-09-03 PROCEDURE — 99214 OFFICE O/P EST MOD 30 MIN: CPT | Performed by: NURSE PRACTITIONER

## 2024-09-03 PROCEDURE — 3074F SYST BP LT 130 MM HG: CPT | Performed by: NURSE PRACTITIONER

## 2024-09-03 RX ORDER — CARVEDILOL 25 MG/1
25 TABLET ORAL 2 TIMES DAILY
Qty: 180 TABLET | Refills: 3 | Status: SHIPPED | OUTPATIENT
Start: 2024-09-03

## 2024-09-03 RX ORDER — SPIRONOLACTONE 50 MG/1
50 TABLET, FILM COATED ORAL DAILY
Qty: 90 TABLET | Refills: 3 | Status: SHIPPED | OUTPATIENT
Start: 2024-09-03

## 2024-09-03 RX ORDER — PREGABALIN 200 MG/1
1 CAPSULE ORAL 3 TIMES DAILY
COMMUNITY
Start: 2024-05-20

## 2024-09-03 NOTE — ASSESSMENT & PLAN NOTE
Patient has had progressively worsening dyspnea with exertion over the past year.  States that he is now also short of breath with rest.  Would recommend pursuing an echocardiogram as well as a myocardial perfusion stress test.

## 2024-09-03 NOTE — ASSESSMENT & PLAN NOTE
BP controlled in clinic at 120/80  Continue the following antihypertensive regimen:  Carvedilol 25 mg twice daily  Edarbi 80 mg/day  Spironolactone 50 mg/day

## 2024-09-03 NOTE — PROGRESS NOTES
CARDIOLOGY        Patient Name: Jake Alberts  :1945  Age: 78 y.o.  Primary Cardiologist: Joshua Tinsley MD  Encounter Provider:  RENETTA Salas    Date of Service: 24      CHIEF COMPLAINT / REASON FOR OFFICE VISIT     Follow-Up, Hypertension, and Coronary Artery Disease      HISTORY OF PRESENT ILLNESS       Hypertension  Associated symptoms include shortness of breath. Pertinent negatives include no chest pain, headaches or malaise/fatigue.   Coronary Artery Disease  Symptoms include shortness of breath. Pertinent negatives include no chest pain, leg swelling or weight gain.     Jake Alberts is a 78 y.o. male who presents today for annual evaluation.    Pt has a  history significant for CAD, hypertension.    Patient was evaluated by Dr. Tinsley last month where he was found to have extremely elevated blood pressure readings.  At that time he was transitioned from lisinopril to Edarbi.    Patient was evaluated by me 2 weeks ago and atenolol was transitioned to carvedilol and spironolactone was added.    Patient was last evaluated in clinic in 2023.  At that time his blood pressure was at goal.    Patient presents today with his wife.  He has noted increased shortness of breath, he was hospitalized in May for pneumonia.  He notes that the dyspnea has been present for about a year, but seems to have worsened.  He admits that he does not exercise, he does have intermittent chest pain that at times is with exertional and at other times can occur with exertion.  He feels that it is mainly anxiety drive,  Denies pain within the past week.      The following portions of the patient's history were reviewed and updated as appropriate: allergies, current medications, past family history, past medical history, past social history, past surgical history and problem list.      VITAL SIGNS     Visit Vitals  /80 (BP Location: Right arm, Patient Position: Sitting)   Pulse 59   Ht 177.8  "cm (70\")   Wt (!) 157 kg (347 lb)   SpO2 97%   BMI 49.79 kg/m²           Wt Readings from Last 3 Encounters:   09/03/24 (!) 157 kg (347 lb)   07/05/23 134 kg (296 lb)   04/06/23 136 kg (300 lb)     Body mass index is 49.79 kg/m².      REVIEW OF SYSTEMS   Review of Systems   Constitutional: Negative for chills, fever, malaise/fatigue, weight gain and weight loss.   HENT:  Negative for congestion.    Cardiovascular:  Negative for chest pain and leg swelling.   Respiratory:  Positive for shortness of breath. Negative for cough, snoring and wheezing.    Hematologic/Lymphatic: Negative for bleeding problem. Does not bruise/bleed easily.   Skin:  Negative for color change.   Musculoskeletal:  Negative for falls, joint pain and myalgias.   Gastrointestinal:  Negative for melena.   Genitourinary:  Negative for hematuria.   Neurological:  Negative for excessive daytime sleepiness, headaches and light-headedness.   Psychiatric/Behavioral:  Negative for depression. The patient is not nervous/anxious.    All other systems reviewed and are negative.          PHYSICAL EXAMINATION     Constitutional:       Appearance: Normal appearance. Well-developed.   Eyes:      Conjunctiva/sclera: Conjunctivae normal.   Neck:      Vascular: No carotid bruit.   Pulmonary:      Effort: Pulmonary effort is normal.      Breath sounds: Normal breath sounds.   Cardiovascular:      Normal rate. Regular rhythm. Normal S1. Normal S2.       Murmurs: There is no murmur.      No gallop.  No click. No rub.   Edema:     Peripheral edema absent.   Musculoskeletal: Normal range of motion. Skin:     General: Skin is warm and dry.   Neurological:      Mental Status: Alert and oriented to person, place, and time.      GCS: GCS eye subscore is 4. GCS verbal subscore is 5. GCS motor subscore is 6.   Psychiatric:         Speech: Speech normal.         Behavior: Behavior normal.         Thought Content: Thought content normal.         Judgment: Judgment normal. "           REVIEWED DATA       ECG 12 Lead    Date/Time: 9/3/2024 11:11 AM  Performed by: Ana Elmore APRN    Authorized by: Ana Elmore APRN  Comparison: compared with previous ECG from 7/5/2023  Rhythm: sinus rhythm  Rate: normal  BPM: 59  Conduction: conduction normal  ST Segments: ST segments normal  T Waves: T waves normal  QRS axis: normal    Clinical impression: normal ECG          Cardiac Procedures:  Cardiac catheterization 12/18/2018.  Left main diffuse 20% stenosis.  LAD 20% mid vessel stenosis.  Left circumflex 20-30% proximal stenosis.  RCA with tubular 40% mid vessel stenosis.  Normal LV size and systolic function.  Elevated LV end-diastolic pressure.  Recommend weight loss and medical management.  Echocardiogram 4/14/2021.  LVEF 56-60%.  Diastolic function normal.  Saline test results are negative.  Normal estimated RVSP.  Carotid ultrasound 4/14/2021.  Normal bilaterally.          ASSESSMENT & PLAN     Diagnoses and all orders for this visit:    1. Essential hypertension (Primary)  Assessment & Plan:  BP controlled in clinic at 120/80  Continue the following antihypertensive regimen:  Carvedilol 25 mg twice daily  Edarbi 80 mg/day  Spironolactone 50 mg/day    Orders:  -     carvedilol (COREG) 25 MG tablet; Take 1 tablet by mouth 2 (Two) Times a Day.  Dispense: 180 tablet; Refill: 3  -     azilsartan medoxomil (Edarbi) 80 MG tablet tablet; Take 1 tablet by mouth Daily.  Dispense: 90 tablet; Refill: 3  -     spironolactone (ALDACTONE) 50 MG tablet; Take 1 tablet by mouth Daily.  Dispense: 90 tablet; Refill: 3    2. Precordial pain  Assessment & Plan:  Patient has had intermittent episodes of chest tightness, his daughter suspects that this could be anxiety related however at times chest pressure comes with exertion  Patient has not had a ischemic evaluation since 2018, recommend pursuing myocardial perfusion stress test.  Patient will be unable to ambulate on a treadmill secondary to  orthopedic issues.  Also recommend echocardiogram    Orders:  -     Stress Test With Myocardial Perfusion One Day; Future  -     Adult Transthoracic Echo Complete W/ Cont if Necessary Per Protocol; Future    3. ARREDONDO (dyspnea on exertion)  Assessment & Plan:  Patient has had progressively worsening dyspnea with exertion over the past year.  States that he is now also short of breath with rest.  Would recommend pursuing an echocardiogram as well as a myocardial perfusion stress test.    Orders:  -     Stress Test With Myocardial Perfusion One Day; Future  -     Adult Transthoracic Echo Complete W/ Cont if Necessary Per Protocol; Future        Return in about 1 year (around 9/3/2025) for Dr. Tinsley- Routine.    Future Appointments         Provider Department Center    9/23/2024 7:00 AM [facility] ECHO/VAS [facility] OUTPATIENT ECHOCARDIOGRAPHY [facility]    9/23/2024 7:30 AM [facility] NUC CARD [facility]    9/5/2025 11:20 AM Joshua Tinsley MD [facility] MEDICAL [facility] CARDIOLOGY [facility]              MEDICATIONS         Discharge Medications            Accurate as of September 3, 2024  3:03 PM. If you have any questions, ask your nurse or doctor.                Continue These Medications        Instructions Start Date   Aspir-Low 81 MG EC tablet  Generic drug: aspirin   81 mg, Oral, Nightly      azilsartan medoxomil 80 MG tablet tablet  Commonly known as: Edarbi   80 mg, Oral, Daily      Breztri Aerosphere 160-9-4.8 MCG/ACT aerosol inhaler  Generic drug: Budeson-Glycopyrrol-Formoterol   2 puffs, Inhalation, 2 Times Daily      carvedilol 25 MG tablet  Commonly known as: COREG   25 mg, Oral, 2 Times Daily      pravastatin 40 MG tablet  Commonly known as: PRAVACHOL   40 mg, Oral, Nightly      pregabalin 200 MG capsule  Commonly known as: LYRICA   1 capsule, Oral, 3 times daily      spironolactone 50 MG tablet  Commonly known as: ALDACTONE   50 mg, Oral, Daily                    **Dragon Disclaimer:   Much of this encounter note is an electronic transcription/translation of spoken language to printed text. The electronic translation of spoken language may permit erroneous, or at times, nonsensical words or phrases to be inadvertently transcribed. Although I have reviewed the note for such errors, some may still exist.

## 2024-09-03 NOTE — ASSESSMENT & PLAN NOTE
Patient has had intermittent episodes of chest tightness, his daughter suspects that this could be anxiety related however at times chest pressure comes with exertion  Patient has not had a ischemic evaluation since 2018, recommend pursuing myocardial perfusion stress test.  Patient will be unable to ambulate on a treadmill secondary to orthopedic issues.  Also recommend echocardiogram

## 2024-09-20 ENCOUNTER — TELEPHONE (OUTPATIENT)
Dept: CARDIOLOGY | Facility: CLINIC | Age: 79
End: 2024-09-20
Payer: MEDICARE

## 2024-09-23 ENCOUNTER — HOSPITAL ENCOUNTER (OUTPATIENT)
Dept: CARDIOLOGY | Facility: HOSPITAL | Age: 79
Discharge: HOME | End: 2024-09-23
Payer: MEDICARE

## 2024-09-23 VITALS
WEIGHT: 315 LBS | HEART RATE: 70 BPM | BODY MASS INDEX: 45.1 KG/M2 | SYSTOLIC BLOOD PRESSURE: 134 MMHG | DIASTOLIC BLOOD PRESSURE: 82 MMHG | HEIGHT: 70 IN

## 2024-09-23 DIAGNOSIS — R06.09 DOE (DYSPNEA ON EXERTION): ICD-10-CM

## 2024-09-23 DIAGNOSIS — R07.2 PRECORDIAL PAIN: ICD-10-CM

## 2024-09-23 LAB
AORTIC DIMENSIONLESS INDEX: 0.7 (DI)
ASCENDING AORTA: 3.1 CM
BH CV ECHO MEAS - ACS: 1.83 CM
BH CV ECHO MEAS - AI P1/2T: 1031 MSEC
BH CV ECHO MEAS - AO MAX PG: 7.9 MMHG
BH CV ECHO MEAS - AO MEAN PG: 4.8 MMHG
BH CV ECHO MEAS - AO ROOT AREA (BSA CORRECTED): 1.5 CM2
BH CV ECHO MEAS - AO ROOT DIAM: 3.2 CM
BH CV ECHO MEAS - AO V2 MAX: 141 CM/SEC
BH CV ECHO MEAS - AO V2 VTI: 33.9 CM
BH CV ECHO MEAS - AVA(I,D): 2.3 CM2
BH CV ECHO MEAS - EDV(CUBED): 294.6 ML
BH CV ECHO MEAS - EDV(MOD-SP2): 132 ML
BH CV ECHO MEAS - EDV(MOD-SP4): 136 ML
BH CV ECHO MEAS - EF(MOD-BP): 61.8 %
BH CV ECHO MEAS - EF(MOD-SP2): 67.4 %
BH CV ECHO MEAS - EF(MOD-SP4): 56.6 %
BH CV ECHO MEAS - ESV(CUBED): 117.8 ML
BH CV ECHO MEAS - ESV(MOD-SP2): 43 ML
BH CV ECHO MEAS - ESV(MOD-SP4): 59 ML
BH CV ECHO MEAS - FS: 26.3 %
BH CV ECHO MEAS - IVS/LVPW: 0.95 CM
BH CV ECHO MEAS - IVSD: 0.85 CM
BH CV ECHO MEAS - LAT PEAK E' VEL: 7.2 CM/SEC
BH CV ECHO MEAS - LV DIASTOLIC VOL/BSA (35-75): 51.6 CM2
BH CV ECHO MEAS - LV MASS(C)D: 249.5 GRAMS
BH CV ECHO MEAS - LV MAX PG: 3.5 MMHG
BH CV ECHO MEAS - LV MEAN PG: 1.77 MMHG
BH CV ECHO MEAS - LV SYSTOLIC VOL/BSA (12-30): 22.4 CM2
BH CV ECHO MEAS - LV V1 MAX: 93.5 CM/SEC
BH CV ECHO MEAS - LV V1 VTI: 23.8 CM
BH CV ECHO MEAS - LVIDD: 6.7 CM
BH CV ECHO MEAS - LVIDS: 4.9 CM
BH CV ECHO MEAS - LVOT AREA: 3.3 CM2
BH CV ECHO MEAS - LVOT DIAM: 2.04 CM
BH CV ECHO MEAS - LVPWD: 0.9 CM
BH CV ECHO MEAS - MED PEAK E' VEL: 9.8 CM/SEC
BH CV ECHO MEAS - MR MAX PG: 57.5 MMHG
BH CV ECHO MEAS - MR MAX VEL: 379.1 CM/SEC
BH CV ECHO MEAS - MV A DUR: 0.11 SEC
BH CV ECHO MEAS - MV A MAX VEL: 126.1 CM/SEC
BH CV ECHO MEAS - MV DEC SLOPE: 460.2 CM/SEC2
BH CV ECHO MEAS - MV DEC TIME: 0.21 SEC
BH CV ECHO MEAS - MV E MAX VEL: 105.1 CM/SEC
BH CV ECHO MEAS - MV E/A: 0.83
BH CV ECHO MEAS - MV MAX PG: 5.2 MMHG
BH CV ECHO MEAS - MV MEAN PG: 2.5 MMHG
BH CV ECHO MEAS - MV P1/2T: 75.9 MSEC
BH CV ECHO MEAS - MV V2 VTI: 43.9 CM
BH CV ECHO MEAS - MVA(P1/2T): 2.9 CM2
BH CV ECHO MEAS - MVA(VTI): 1.78 CM2
BH CV ECHO MEAS - PA V2 MAX: 93.7 CM/SEC
BH CV ECHO MEAS - PULM A REVS DUR: 0.11 SEC
BH CV ECHO MEAS - PULM A REVS VEL: 24.5 CM/SEC
BH CV ECHO MEAS - PULM DIAS VEL: 44 CM/SEC
BH CV ECHO MEAS - PULM S/D: 1.07
BH CV ECHO MEAS - PULM SYS VEL: 47.1 CM/SEC
BH CV ECHO MEAS - QP/QS: 0.42
BH CV ECHO MEAS - RAP SYSTOLE: 3 MMHG
BH CV ECHO MEAS - RV MAX PG: 0.92 MMHG
BH CV ECHO MEAS - RV V1 MAX: 48 CM/SEC
BH CV ECHO MEAS - RV V1 VTI: 9.3 CM
BH CV ECHO MEAS - RVOT DIAM: 2.12 CM
BH CV ECHO MEAS - RVSP: 33 MMHG
BH CV ECHO MEAS - SV(LVOT): 78 ML
BH CV ECHO MEAS - SV(MOD-SP2): 89 ML
BH CV ECHO MEAS - SV(MOD-SP4): 77 ML
BH CV ECHO MEAS - SV(RVOT): 32.6 ML
BH CV ECHO MEAS - SVI(LVOT): 29.6 ML/M2
BH CV ECHO MEAS - SVI(MOD-SP2): 33.7 ML/M2
BH CV ECHO MEAS - SVI(MOD-SP4): 29.2 ML/M2
BH CV ECHO MEAS - TAPSE (>1.6): 2.42 CM
BH CV ECHO MEAS - TR MAX PG: 29.8 MMHG
BH CV ECHO MEAS - TR MAX VEL: 273 CM/SEC
BH CV ECHO MEASUREMENTS AVERAGE E/E' RATIO: 12.36
BH CV NUCLEAR PRIOR STUDY: 2
BH CV STRESS BP STAGE 1: NORMAL
BH CV STRESS COMMENTS STAGE 1: NORMAL
BH CV STRESS DOSE REGADENOSON STAGE 1: 0.4
BH CV STRESS DURATION MIN STAGE 1: 0
BH CV STRESS DURATION SEC STAGE 1: 10
BH CV STRESS HR STAGE 1: 69
BH CV STRESS NUCLEAR ISOTOPE DOSE: 41.6 MCI
BH CV STRESS PROTOCOL 1: NORMAL
BH CV STRESS RECOVERY BP: NORMAL MMHG
BH CV STRESS RECOVERY HR: 65 BPM
BH CV STRESS STAGE 1: 1
BH CV XLRA - RV BASE: 4.2 CM
BH CV XLRA - RV LENGTH: 7.4 CM
BH CV XLRA - RV MID: 3.1 CM
BH CV XLRA - TDI S': 9.5 CM/SEC
LEFT ATRIUM VOLUME INDEX: 38.3 ML/M2
LV EF NUC BP: 58 %
MAXIMAL PREDICTED HEART RATE: 144 BPM
PERCENT MAX PREDICTED HR: 47.92 %
SINUS: 3.9 CM
STJ: 3.3 CM
STRESS BASELINE BP: NORMAL MMHG
STRESS BASELINE HR: 60 BPM
STRESS PERCENT HR: 56 %
STRESS POST EXERCISE DUR SEC: 10 SEC
STRESS POST PEAK BP: NORMAL MMHG
STRESS POST PEAK HR: 69 BPM
STRESS TARGET HR: 122 BPM

## 2024-09-23 PROCEDURE — 78452 HT MUSCLE IMAGE SPECT MULT: CPT

## 2024-09-23 PROCEDURE — 93306 TTE W/DOPPLER COMPLETE: CPT

## 2024-09-23 PROCEDURE — 93017 CV STRESS TEST TRACING ONLY: CPT

## 2024-09-23 PROCEDURE — 25010000002 REGADENOSON 0.4 MG/5ML SOLUTION: Performed by: NURSE PRACTITIONER

## 2024-09-23 PROCEDURE — 93016 CV STRESS TEST SUPVJ ONLY: CPT | Performed by: INTERNAL MEDICINE

## 2024-09-23 PROCEDURE — 78451 HT MUSCLE IMAGE SPECT SING: CPT | Performed by: INTERNAL MEDICINE

## 2024-09-23 PROCEDURE — 0 TECHNETIUM TETROFOSMIN KIT: Performed by: NURSE PRACTITIONER

## 2024-09-23 PROCEDURE — A9502 TC99M TETROFOSMIN: HCPCS | Performed by: NURSE PRACTITIONER

## 2024-09-23 PROCEDURE — 93306 TTE W/DOPPLER COMPLETE: CPT | Performed by: STUDENT IN AN ORGANIZED HEALTH CARE EDUCATION/TRAINING PROGRAM

## 2024-09-23 PROCEDURE — 93018 CV STRESS TEST I&R ONLY: CPT | Performed by: INTERNAL MEDICINE

## 2024-09-23 PROCEDURE — 78451 HT MUSCLE IMAGE SPECT SING: CPT

## 2024-09-23 PROCEDURE — 25510000001 PERFLUTREN 6.52 MG/ML SUSPENSION 2 ML VIAL: Performed by: NURSE PRACTITIONER

## 2024-09-23 RX ORDER — REGADENOSON 0.08 MG/ML
0.4 INJECTION, SOLUTION INTRAVENOUS
Status: COMPLETED | OUTPATIENT
Start: 2024-09-23 | End: 2024-09-23

## 2024-09-23 RX ADMIN — REGADENOSON 0.4 MG: 0.08 INJECTION, SOLUTION INTRAVENOUS at 08:08

## 2024-09-23 RX ADMIN — TETROFOSMIN 1 DOSE: 1.38 INJECTION, POWDER, LYOPHILIZED, FOR SOLUTION INTRAVENOUS at 08:08

## 2024-09-23 RX ADMIN — PERFLUTREN 1 ML: 6.52 INJECTION, SUSPENSION INTRAVENOUS at 07:52

## 2025-04-16 ENCOUNTER — LAB (OUTPATIENT)
Dept: LAB | Facility: HOSPITAL | Age: 80
End: 2025-04-16
Payer: MEDICARE

## 2025-04-16 ENCOUNTER — CONSULT (OUTPATIENT)
Dept: ONCOLOGY | Facility: CLINIC | Age: 80
End: 2025-04-16
Payer: MEDICARE

## 2025-04-16 ENCOUNTER — PRIOR AUTHORIZATION (OUTPATIENT)
Dept: ONCOLOGY | Facility: CLINIC | Age: 80
End: 2025-04-16
Payer: MEDICARE

## 2025-04-16 VITALS
TEMPERATURE: 97.5 F | HEART RATE: 74 BPM | WEIGHT: 315 LBS | OXYGEN SATURATION: 96 % | SYSTOLIC BLOOD PRESSURE: 109 MMHG | DIASTOLIC BLOOD PRESSURE: 72 MMHG | HEIGHT: 70 IN | BODY MASS INDEX: 45.1 KG/M2

## 2025-04-16 DIAGNOSIS — R79.89 ABNORMAL CBC: Primary | ICD-10-CM

## 2025-04-16 DIAGNOSIS — D72.829 LEUKOCYTOSIS, UNSPECIFIED TYPE: Primary | ICD-10-CM

## 2025-04-16 DIAGNOSIS — D72.829 LEUKOCYTOSIS, UNSPECIFIED TYPE: ICD-10-CM

## 2025-04-16 LAB
ALBUMIN SERPL-MCNC: 3.9 G/DL (ref 3.5–5.2)
ALBUMIN/GLOB SERPL: 1.6 G/DL
ALP SERPL-CCNC: 104 U/L (ref 39–117)
ALT SERPL W P-5'-P-CCNC: 37 U/L (ref 1–41)
ANION GAP SERPL CALCULATED.3IONS-SCNC: 12.3 MMOL/L (ref 5–15)
AST SERPL-CCNC: 25 U/L (ref 1–40)
BASOPHILS # BLD AUTO: 0.06 10*3/MM3 (ref 0–0.2)
BASOPHILS NFR BLD AUTO: 0.4 % (ref 0–1.5)
BILIRUB SERPL-MCNC: 0.2 MG/DL (ref 0–1.2)
BUN SERPL-MCNC: 20 MG/DL (ref 8–23)
BUN/CREAT SERPL: 16.3 (ref 7–25)
CALCIUM SPEC-SCNC: 9.1 MG/DL (ref 8.6–10.5)
CHLORIDE SERPL-SCNC: 107 MMOL/L (ref 98–107)
CO2 SERPL-SCNC: 21.7 MMOL/L (ref 22–29)
CREAT SERPL-MCNC: 1.23 MG/DL (ref 0.76–1.27)
CRP SERPL-MCNC: 1.97 MG/DL (ref 0–0.5)
DEPRECATED RDW RBC AUTO: 46.6 FL (ref 37–54)
EGFRCR SERPLBLD CKD-EPI 2021: 60.5 ML/MIN/1.73
EOSINOPHIL # BLD AUTO: 0.34 10*3/MM3 (ref 0–0.4)
EOSINOPHIL NFR BLD AUTO: 2.4 % (ref 0.3–6.2)
ERYTHROCYTE [DISTWIDTH] IN BLOOD BY AUTOMATED COUNT: 14.6 % (ref 12.3–15.4)
ERYTHROCYTE [SEDIMENTATION RATE] IN BLOOD: 12 MM/HR (ref 0–20)
GLOBULIN UR ELPH-MCNC: 2.5 GM/DL
GLUCOSE SERPL-MCNC: 99 MG/DL (ref 65–99)
HCT VFR BLD AUTO: 41.7 % (ref 37.5–51)
HGB BLD-MCNC: 13.3 G/DL (ref 13–17.7)
IMM GRANULOCYTES # BLD AUTO: 0.08 10*3/MM3 (ref 0–0.05)
IMM GRANULOCYTES NFR BLD AUTO: 0.6 % (ref 0–0.5)
LDH SERPL-CCNC: 183 U/L (ref 135–225)
LYMPHOCYTES # BLD AUTO: 6.07 10*3/MM3 (ref 0.7–3.1)
LYMPHOCYTES NFR BLD AUTO: 42 % (ref 19.6–45.3)
MCH RBC QN AUTO: 27.8 PG (ref 26.6–33)
MCHC RBC AUTO-ENTMCNC: 31.9 G/DL (ref 31.5–35.7)
MCV RBC AUTO: 87.2 FL (ref 79–97)
MONOCYTES # BLD AUTO: 0.82 10*3/MM3 (ref 0.1–0.9)
MONOCYTES NFR BLD AUTO: 5.7 % (ref 5–12)
NEUTROPHILS NFR BLD AUTO: 48.9 % (ref 42.7–76)
NEUTROPHILS NFR BLD AUTO: 7.08 10*3/MM3 (ref 1.7–7)
NRBC BLD AUTO-RTO: 0 /100 WBC (ref 0–0.2)
PLATELET # BLD AUTO: 187 10*3/MM3 (ref 140–450)
PMV BLD AUTO: 10.8 FL (ref 6–12)
POTASSIUM SERPL-SCNC: 4 MMOL/L (ref 3.5–5.2)
PROT SERPL-MCNC: 6.4 G/DL (ref 6–8.5)
RBC # BLD AUTO: 4.78 10*6/MM3 (ref 4.14–5.8)
SODIUM SERPL-SCNC: 141 MMOL/L (ref 136–145)
WBC NRBC COR # BLD AUTO: 14.45 10*3/MM3 (ref 3.4–10.8)

## 2025-04-16 PROCEDURE — 99204 OFFICE O/P NEW MOD 45 MIN: CPT | Performed by: INTERNAL MEDICINE

## 2025-04-16 PROCEDURE — 1160F RVW MEDS BY RX/DR IN RCRD: CPT | Performed by: INTERNAL MEDICINE

## 2025-04-16 PROCEDURE — 3078F DIAST BP <80 MM HG: CPT | Performed by: INTERNAL MEDICINE

## 2025-04-16 PROCEDURE — 83615 LACTATE (LD) (LDH) ENZYME: CPT | Performed by: INTERNAL MEDICINE

## 2025-04-16 PROCEDURE — 3074F SYST BP LT 130 MM HG: CPT | Performed by: INTERNAL MEDICINE

## 2025-04-16 PROCEDURE — 85652 RBC SED RATE AUTOMATED: CPT | Performed by: INTERNAL MEDICINE

## 2025-04-16 PROCEDURE — 80053 COMPREHEN METABOLIC PANEL: CPT | Performed by: INTERNAL MEDICINE

## 2025-04-16 PROCEDURE — 85025 COMPLETE CBC W/AUTO DIFF WBC: CPT

## 2025-04-16 PROCEDURE — 86140 C-REACTIVE PROTEIN: CPT | Performed by: INTERNAL MEDICINE

## 2025-04-16 PROCEDURE — 1159F MED LIST DOCD IN RCRD: CPT | Performed by: INTERNAL MEDICINE

## 2025-04-16 PROCEDURE — 1126F AMNT PAIN NOTED NONE PRSNT: CPT | Performed by: INTERNAL MEDICINE

## 2025-04-16 PROCEDURE — 36415 COLL VENOUS BLD VENIPUNCTURE: CPT

## 2025-04-16 RX ORDER — GABAPENTIN 300 MG/1
300 CAPSULE ORAL 2 TIMES DAILY
COMMUNITY
Start: 2025-04-15

## 2025-04-16 RX ORDER — TIZANIDINE 2 MG/1
4 TABLET ORAL NIGHTLY
COMMUNITY
Start: 2025-03-19 | End: 2025-09-15

## 2025-04-16 RX ORDER — HYDROCODONE BITARTRATE AND ACETAMINOPHEN 10; 325 MG/1; MG/1
1 TABLET ORAL 3 TIMES DAILY PRN
COMMUNITY
Start: 2025-02-25

## 2025-04-16 NOTE — TELEPHONE ENCOUNTER
No PA required for Flow or Fish per [employer]. Transaction ID # [id number]. Ok'd lab to send to [facility].  PA for Jak2 Mut, Reflex Exon12, MPL, and CALR will have to wait till Fish results are back.

## 2025-04-16 NOTE — LETTER
April 16, 2025     Yun Ruff MD  [address] [place] [address] [address]  [place] [address]    Patient: Jake Alberts   YOB: 1945   Date of Visit: 4/16/2025     Dear Yun Ruff MD:       Thank you for referring Jake Alberts to me for evaluation. Below are the relevant portions of my assessment and plan of care.    If you have questions, please do not hesitate to call me. I look forward to following Jake along with you.         Sincerely,        Jaime Gutierrez MD        CC: No Recipients    Jaime Gutierrez Jr., MD  04/16/25 2202  Sign when Signing Visit  Chief Complaint  Chronic leukocytosis    Subjective   The patient was referred by Dr. Yun Ruff for hematology consultation regarding the above issue    History of Present Illness    The patient is a 79-year-old gentleman here today for initial hematology consultation regarding leukocytosis.    The patient has past medical history significant for hypertension, hyperlipidemia, coronary artery disease, CKD3a, obesity, TIA (2009), obstructive sleep apnea (on CPAP), hepatic steatosis.  He has a history of prostate cancer and underwent prostatectomy in 2015, has had an undetectable PSA ever since, recently followed on an annual basis by his PCP.  He has chronic low back pain and is on chronic narcotics with hydrocodone 10/325 in addition to gabapentin 300 mg twice daily and Zanaflex 4 mg nightly.    Of note, patient previously had CT chest performed on 8/23/2022 following COVID-19 infection performed at outside facility which did show evidence of mild mediastinal and hilar lymphadenopathy.  By the patient's report he did undergo a subsequent CT chest at [facility] that showed resolution although we do not have that report.    In reviewing the patient's prior labs available in the [facility] system, he has had a chronic leukocytosis dating back to at least 1/8/2015.  At that time WBC was 11.22.  WBC has ranged from the  "9-17,000 range with normal differential.  He has maintained a normal hemoglobin and platelet count.  Most recent labs from 4/13/21 with WBC 13.8 and normal differential.  Patient reports that he has had additional labs performed routinely by his PCP that are not available for review today.  Patient denies any chronic inflammatory conditions.  He has a history of smoking but is not a current smoker (quit in 1996).  He is not on any steroid medications.  He has not experienced any recent infectious issues.    Patient today reports that he has been in his usual state of health, no recent difficulties.  He is limited in mobility primarily by his low back pain.  He does report hot flashes/night sweats ever since the time of his prostate surgery.      Objective  Vital Signs:   /72   Pulse 74   Temp 97.5 °F (36.4 °C) (Oral)   Ht 177.8 cm (70\")   Wt (!) 156 kg (343 lb 4.8 oz)   SpO2 96%   BMI 49.26 kg/m²     Physical Exam  Constitutional:       Appearance: He is well-developed. He is obese.   Eyes:      Conjunctiva/sclera: Conjunctivae normal.   Neck:      Thyroid: No thyromegaly.   Cardiovascular:      Rate and Rhythm: Normal rate and regular rhythm.      Heart sounds: No murmur heard.     No friction rub. No gallop.   Pulmonary:      Effort: No respiratory distress.      Breath sounds: Normal breath sounds.   Abdominal:      General: Bowel sounds are normal. There is no distension.      Palpations: Abdomen is soft.      Tenderness: There is no abdominal tenderness.   Musculoskeletal:      Comments: Trace-1+ bilateral lower extremity edema   Lymphadenopathy:      Head:      Right side of head: No submandibular adenopathy.      Cervical: No cervical adenopathy.      Upper Body:      Right upper body: No supraclavicular adenopathy.      Left upper body: No supraclavicular adenopathy.   Skin:     General: Skin is warm and dry.      Findings: No rash.      Comments: Patient with multiple seborrheic keratoses, a " number of raised keratinizing skin lesions in the forearms bilaterally   Neurological:      Mental Status: He is alert and oriented to person, place, and time.      Cranial Nerves: No cranial nerve deficit.      Motor: No abnormal muscle tone.      Deep Tendon Reflexes: Reflexes normal.   Psychiatric:         Behavior: Behavior normal.        Result Review: Reviewed CBC from today.  Reviewed multiple prior laboratory records as well as scan results, progress notes.       Assessment and Plan     *Chronic leukocytosis  In reviewing the patient's prior labs available in the [facility] system, he has had a chronic leukocytosis dating back to at least 1/8/2015.  At that time WBC was 11.22.    WBC has ranged from the 9-17,000 range with normal differential.    He has maintained a normal hemoglobin and platelet count.    Most recent labs from 4/13/21 with WBC 13.8 and normal differential.    Patient reports that he has had additional labs performed routinely by his PCP that are not available for review today.    Patient denies any chronic inflammatory conditions.  He has a history of smoking but is not a current smoker (quit in 1996).  He is not on any steroid medications.  He has not experienced any recent infectious issues.  WBC today remains elevated at 14.45 with differential of 49 segs, 42 lymphs, 6 monocytes.  He has normal hemoglobin at 13.3 and normal platelet count at 187,000.  The etiology of his chronic leukocytosis is unclear.  We will check peripheral blood flow cytometry and check LDH.  There is some potential for underlying myeloproliferative disorder and we will check BCR-ABL FISH, JAK2 V617F with reflex to exon 12 mutation, CALR and MPL mutations.  We will also check inflammatory markers with ESR and CRP.  I will see the patient back in 3 weeks with repeat labs and we will review the above results and consider need for any additional evaluation.    *History of prostate cancer  Patient underwent prostatectomy  in 2015  He has had an undetectable PSA ever since prostatectomy, recently followed on an annual basis by his PCP.    *Previous mediastinal/hilar lymphadenopathy following COVID-19 infection  CT chest performed on 8/23/2022 following COVID-19 infection performed at outside facility showed evidence of mild mediastinal and hilar lymphadenopathy.    By the patient's report he did undergo a subsequent CT chest at [facility] that showed resolution.  We will request these records.    *CKD3a  Baseline creatinine in the 1.0-1.3 range    *Coronary artery disease  *History of TIA  *Obstructive sleep apnea (on CPAP)  *Hepatic steatosis  *Hypertension  *Hyperlipidemia      Plan:  Additional labs today with CMP, LDH, ESR, CRP, peripheral blood flow cytometry, BCR-ABL FISH, JAK2 V617F with reflex to exon 12 mutation, CALR mutation, MPL mutation.  We will obtain records from patient's PCP regarding more recent laboratory studies  Obtain records from subsequent prior CT chest at [facility]  MD visit in approximately 3 weeks with CBC, CMP      I did spend 50 minutes total time caring for the patient today, time spent in review of records, face-to-face consultation, coordination of care, placement of orders, completion of documentation.

## 2025-04-16 NOTE — PROGRESS NOTES
Chief Complaint  Chronic leukocytosis    Subjective    The patient was referred by Dr. Yun Ruff for hematology consultation regarding the above issue    History of Present Illness    The patient is a 79-year-old gentleman here today for initial hematology consultation regarding leukocytosis.    The patient has past medical history significant for hypertension, hyperlipidemia, coronary artery disease, CKD3a, obesity, TIA (2009), obstructive sleep apnea (on CPAP), hepatic steatosis.  He has a history of prostate cancer and underwent prostatectomy in 2015, has had an undetectable PSA ever since, recently followed on an annual basis by his PCP.  He has chronic low back pain and is on chronic narcotics with hydrocodone 10/325 in addition to gabapentin 300 mg twice daily and Zanaflex 4 mg nightly.    Of note, patient previously had CT chest performed on 8/23/2022 following COVID-19 infection performed at outside facility which did show evidence of mild mediastinal and hilar lymphadenopathy.  By the patient's report he did undergo a subsequent CT chest at [facility] that showed resolution although we do not have that report.    In reviewing the patient's prior labs available in the "[employer] system, he has had a chronic leukocytosis dating back to at least 1/8/2015.  At that time WBC was 11.22.  WBC has ranged from the 9-17,000 range with normal differential.  He has maintained a normal hemoglobin and platelet count.  Most recent labs from 4/13/21 with WBC 13.8 and normal differential.  Patient reports that he has had additional labs performed routinely by his PCP that are not available for review today.  Patient denies any chronic inflammatory conditions.  He has a history of smoking but is not a current smoker (quit in 1996).  He is not on any steroid medications.  He has not experienced any recent infectious issues.    Patient today reports that he has been in his usual state of health, no recent  "difficulties.  He is limited in mobility primarily by his low back pain.  He does report hot flashes/night sweats ever since the time of his prostate surgery.      Objective   Vital Signs:   /72   Pulse 74   Temp 97.5 °F (36.4 °C) (Oral)   Ht 177.8 cm (70\")   Wt (!) 156 kg (343 lb 4.8 oz)   SpO2 96%   BMI 49.26 kg/m²     Physical Exam  Constitutional:       Appearance: He is well-developed. He is obese.   Eyes:      Conjunctiva/sclera: Conjunctivae normal.   Neck:      Thyroid: No thyromegaly.   Cardiovascular:      Rate and Rhythm: Normal rate and regular rhythm.      Heart sounds: No murmur heard.     No friction rub. No gallop.   Pulmonary:      Effort: No respiratory distress.      Breath sounds: Normal breath sounds.   Abdominal:      General: Bowel sounds are normal. There is no distension.      Palpations: Abdomen is soft.      Tenderness: There is no abdominal tenderness.   Musculoskeletal:      Comments: Trace-1+ bilateral lower extremity edema   Lymphadenopathy:      Head:      Right side of head: No submandibular adenopathy.      Cervical: No cervical adenopathy.      Upper Body:      Right upper body: No supraclavicular adenopathy.      Left upper body: No supraclavicular adenopathy.   Skin:     General: Skin is warm and dry.      Findings: No rash.      Comments: Patient with multiple seborrheic keratoses, a number of raised keratinizing skin lesions in the forearms bilaterally   Neurological:      Mental Status: He is alert and oriented to person, place, and time.      Cranial Nerves: No cranial nerve deficit.      Motor: No abnormal muscle tone.      Deep Tendon Reflexes: Reflexes normal.   Psychiatric:         Behavior: Behavior normal.        Result Review : Reviewed CBC from today.  Reviewed multiple prior laboratory records as well as scan results, progress notes.       Assessment and Plan     *Chronic leukocytosis  In reviewing the patient's prior labs available in the [facility] system, " he has had a chronic leukocytosis dating back to at least 1/8/2015.  At that time WBC was 11.22.    WBC has ranged from the 9-17,000 range with normal differential.    He has maintained a normal hemoglobin and platelet count.    Most recent labs from 4/13/21 with WBC 13.8 and normal differential.    Patient reports that he has had additional labs performed routinely by his PCP that are not available for review today.    Patient denies any chronic inflammatory conditions.  He has a history of smoking but is not a current smoker (quit in 1996).  He is not on any steroid medications.  He has not experienced any recent infectious issues.  WBC today remains elevated at 14.45 with differential of 49 segs, 42 lymphs, 6 monocytes.  He has normal hemoglobin at 13.3 and normal platelet count at 187,000.  The etiology of his chronic leukocytosis is unclear.  We will check peripheral blood flow cytometry and check LDH.  There is some potential for underlying myeloproliferative disorder and we will check BCR-ABL FISH, JAK2 V617F with reflex to exon 12 mutation, CALR and MPL mutations.  We will also check inflammatory markers with ESR and CRP.  I will see the patient back in 3 weeks with repeat labs and we will review the above results and consider need for any additional evaluation.    *History of prostate cancer  Patient underwent prostatectomy in 2015  He has had an undetectable PSA ever since prostatectomy, recently followed on an annual basis by his PCP.    *Previous mediastinal/hilar lymphadenopathy following COVID-19 infection  CT chest performed on 8/23/2022 following COVID-19 infection performed at outside facility showed evidence of mild mediastinal and hilar lymphadenopathy.    By the patient's report he did undergo a subsequent CT chest at [facility] that showed resolution.  We will request these records.    *CKD3a  Baseline creatinine in the 1.0-1.3 range    *Coronary artery disease  *History of  TIA  *Obstructive sleep apnea (on CPAP)  *Hepatic steatosis  *Hypertension  *Hyperlipidemia      Plan:  Additional labs today with CMP, LDH, ESR, CRP, peripheral blood flow cytometry, BCR-ABL FISH, JAK2 V617F with reflex to exon 12 mutation, CALR mutation, MPL mutation.  We will obtain records from patient's PCP regarding more recent laboratory studies  Obtain records from subsequent prior CT chest at [facility]  MD visit in approximately 3 weeks with CBC, CMP      I did spend 50 minutes total time caring for the patient today, time spent in review of records, face-to-face consultation, coordination of care, placement of orders, completion of documentation.

## 2025-04-23 LAB
REF LAB TEST METHOD: NORMAL
REF LAB TEST METHOD: NORMAL

## 2025-04-25 ENCOUNTER — TELEPHONE (OUTPATIENT)
Dept: ONCOLOGY | Facility: CLINIC | Age: 80
End: 2025-04-25
Payer: MEDICARE

## 2025-04-25 NOTE — TELEPHONE ENCOUNTER
----- Message from Viky BRONSON sent at 4/25/2025 12:34 PM EDT -----  Can you move patient's Lab and Matt appointment out to mid June? We are still working on results and prior auths on quite a few test Dr. Gutierrez order. Dinorah ok'd the move.

## 2025-05-08 ENCOUNTER — LAB (OUTPATIENT)
Dept: LAB | Facility: HOSPITAL | Age: 80
End: 2025-05-08
Payer: MEDICARE

## 2025-05-08 DIAGNOSIS — D72.829 LEUKOCYTOSIS, UNSPECIFIED TYPE: ICD-10-CM

## 2025-05-08 LAB
ALBUMIN SERPL-MCNC: 4.2 G/DL (ref 3.5–5.2)
ALBUMIN/GLOB SERPL: 1.7 G/DL
ALP SERPL-CCNC: 110 U/L (ref 39–117)
ALT SERPL W P-5'-P-CCNC: 29 U/L (ref 1–41)
ANION GAP SERPL CALCULATED.3IONS-SCNC: 11.5 MMOL/L (ref 5–15)
AST SERPL-CCNC: 22 U/L (ref 1–40)
BASOPHILS # BLD AUTO: 0.04 10*3/MM3 (ref 0–0.2)
BASOPHILS NFR BLD AUTO: 0.3 % (ref 0–1.5)
BILIRUB SERPL-MCNC: 0.3 MG/DL (ref 0–1.2)
BUN SERPL-MCNC: 19 MG/DL (ref 8–23)
BUN/CREAT SERPL: 13.8 (ref 7–25)
CALCIUM SPEC-SCNC: 9.5 MG/DL (ref 8.6–10.5)
CHLORIDE SERPL-SCNC: 109 MMOL/L (ref 98–107)
CO2 SERPL-SCNC: 27.5 MMOL/L (ref 22–29)
CREAT SERPL-MCNC: 1.38 MG/DL (ref 0.76–1.27)
DEPRECATED RDW RBC AUTO: 48.1 FL (ref 37–54)
EGFRCR SERPLBLD CKD-EPI 2021: 52.7 ML/MIN/1.73
EOSINOPHIL # BLD AUTO: 0.24 10*3/MM3 (ref 0–0.4)
EOSINOPHIL NFR BLD AUTO: 1.9 % (ref 0.3–6.2)
ERYTHROCYTE [DISTWIDTH] IN BLOOD BY AUTOMATED COUNT: 14.6 % (ref 12.3–15.4)
GLOBULIN UR ELPH-MCNC: 2.5 GM/DL
GLUCOSE SERPL-MCNC: 119 MG/DL (ref 65–99)
HCT VFR BLD AUTO: 41.8 % (ref 37.5–51)
HGB BLD-MCNC: 12.9 G/DL (ref 13–17.7)
IMM GRANULOCYTES # BLD AUTO: 0.09 10*3/MM3 (ref 0–0.05)
IMM GRANULOCYTES NFR BLD AUTO: 0.7 % (ref 0–0.5)
LYMPHOCYTES # BLD AUTO: 4.77 10*3/MM3 (ref 0.7–3.1)
LYMPHOCYTES NFR BLD AUTO: 37.5 % (ref 19.6–45.3)
MCH RBC QN AUTO: 27.7 PG (ref 26.6–33)
MCHC RBC AUTO-ENTMCNC: 30.9 G/DL (ref 31.5–35.7)
MCV RBC AUTO: 89.7 FL (ref 79–97)
MONOCYTES # BLD AUTO: 1.08 10*3/MM3 (ref 0.1–0.9)
MONOCYTES NFR BLD AUTO: 8.5 % (ref 5–12)
NEUTROPHILS NFR BLD AUTO: 51.1 % (ref 42.7–76)
NEUTROPHILS NFR BLD AUTO: 6.51 10*3/MM3 (ref 1.7–7)
NRBC BLD AUTO-RTO: 0 /100 WBC (ref 0–0.2)
PLATELET # BLD AUTO: 207 10*3/MM3 (ref 140–450)
PMV BLD AUTO: 10.5 FL (ref 6–12)
POTASSIUM SERPL-SCNC: 5 MMOL/L (ref 3.5–5.2)
PROT SERPL-MCNC: 6.7 G/DL (ref 6–8.5)
RBC # BLD AUTO: 4.66 10*6/MM3 (ref 4.14–5.8)
SODIUM SERPL-SCNC: 148 MMOL/L (ref 136–145)
WBC NRBC COR # BLD AUTO: 12.73 10*3/MM3 (ref 3.4–10.8)

## 2025-05-08 PROCEDURE — 85025 COMPLETE CBC W/AUTO DIFF WBC: CPT

## 2025-05-08 PROCEDURE — 36415 COLL VENOUS BLD VENIPUNCTURE: CPT

## 2025-05-08 PROCEDURE — 80053 COMPREHEN METABOLIC PANEL: CPT

## 2025-05-12 LAB — JAK2 V617F RESULT: NORMAL

## 2025-05-13 ENCOUNTER — TELEPHONE (OUTPATIENT)
Dept: ONCOLOGY | Facility: CLINIC | Age: 80
End: 2025-05-13
Payer: MEDICARE

## 2025-05-13 NOTE — TELEPHONE ENCOUNTER
----- Message from Fela SANTORO sent at 5/13/2025 10:22 AM EDT -----  Please move patient up to this Thursday 5/15 at 1:40 per Dr. Gutierrez. Thanks!  ----- Message -----  From: Jaime Gutierrez Jr., MD  Sent: 5/13/2025  10:21 AM EDT  To: Fela Alamo RN    Yes  ----- Message -----  From: Fela Alamo RN  Sent: 5/13/2025  10:13 AM EDT  To: Jaime Gutierrez Jr., MD    Would this Thursday after your new oncology patient be OK?  ----- Message -----  From: Jaime Gutierrez Jr., MD  Sent: 5/8/2025  10:15 PM EDT  To: Fela Alamo RN    Why did we move his appointment out till June?  His flow cytometry shows CLL and I needed to talk to him about it.  We need to schedule him back within the next 2 weeks.  Could schedule him on a Thursday, 2 units with CBC, CMP

## 2025-05-13 NOTE — TELEPHONE ENCOUNTER
Spoke with pt to r/s appt per staff msg. He confirmed appt date and time and is confirming with his daughter. He will let me know if this date does not work.

## 2025-05-14 NOTE — PROGRESS NOTES
"Chief Complaint  CLL    Subjective        History of Present Illness    Patient returns today in follow-up from his initial consultation with laboratory studies to review.  In the interval since his last visit, he has no new complaints.  He does continue with osteoarthritic pain in his knees.  He denies any fevers or night sweats.  He does have some mild fatigue however this is relatively unchanged.      Objective   Vital Signs:   /80   Pulse 59   Temp 98 °F (36.7 °C)   Resp 17   Ht 177.8 cm (70\")   Wt (!) 156 kg (344 lb)   SpO2 95%   BMI 49.36 kg/m²     Physical Exam  Constitutional:       Appearance: He is well-developed. He is obese.   Eyes:      Conjunctiva/sclera: Conjunctivae normal.   Neck:      Thyroid: No thyromegaly.   Cardiovascular:      Rate and Rhythm: Normal rate and regular rhythm.      Heart sounds: No murmur heard.     No friction rub. No gallop.   Pulmonary:      Effort: No respiratory distress.      Breath sounds: Normal breath sounds.   Abdominal:      General: Bowel sounds are normal. There is no distension.      Palpations: Abdomen is soft.      Tenderness: There is no abdominal tenderness.   Musculoskeletal:      Comments: Trace-1+ bilateral lower extremity edema   Lymphadenopathy:      Head:      Right side of head: No submandibular adenopathy.      Cervical: No cervical adenopathy.      Upper Body:      Right upper body: No supraclavicular adenopathy.      Left upper body: No supraclavicular adenopathy.   Skin:     General: Skin is warm and dry.      Findings: No rash.      Comments: Patient with multiple seborrheic keratoses, a number of raised keratinizing skin lesions in the forearms bilaterally   Neurological:      Mental Status: He is alert and oriented to person, place, and time.      Cranial Nerves: No cranial nerve deficit.      Motor: No abnormal muscle tone.      Deep Tendon Reflexes: Reflexes normal.   Psychiatric:         Behavior: Behavior normal.        Patient " was examined today, unchanged from above    Result Review : Reviewed CBC, CMP from today.  Reviewed multiple labs from 4/16/2025 as outlined below.       Assessment and Plan     *CLL  Patient has had a chronic leukocytosis dating back to at least 1/8/2015.  At that time WBC was 11.22.    WBC has ranged from the 9-17,000 range with normal differential.    He has maintained a normal hemoglobin and platelet count.    CT chest performed on 8/23/2022 following COVID-19 infection performed at outside facility showed evidence of mild mediastinal and hilar lymphadenopathy.  Patient was seen in initial consultation on 4/16/2025.  At that time WBC was 14.45 with differential of 49 segs, 42 lymphs, 6 monocytes.  He had normal hemoglobin at 13.3 and normal platelet count at 187,000.    Additional labs from 4/16/2025 with CRP 1.97, ESR 12, , negative BCR-ABL FISH, negative peripheral blood JAK2 V617F mutation.  Peripheral blood flow cytometry identified a 24% population of CD5 positive monoclonal B cells with CLL/SLL phenotype.  Patient returns today in follow-up from his initial consultation.  We reviewed his peripheral blood flow cytometry result from 4/16/2025 identifying a 24% population of CD5 positive monoclonal B cells consistent with diagnosis of CLL.  Today he has WBC that remains in his prior baseline range at 16.75 with differential of 52 segs, 39 lymphs, 6 monocytes with absolute lymphocyte count of 6.61.  Hemoglobin remains normal at 12.9 and platelet count normal at 232,000.  He has no palpable lymphadenopathy nor splenomegaly on exam today although abdominal exam limited by body habitus.  The patient's daughter was accompanying him today as well.  We reviewed his diagnosis of CLL.  We discussed that we will need to follow-up the lymphadenopathy seen on the prior scan from August 2022 and we will check CT chest abdomen pelvis in approximately 2 weeks.  We have also sent off additional labs today with  peripheral blood CLL FISH panel and IGHV mutation status.  I will see him back in 3 weeks to review all of those results.  We will specifically request the CT scan to be compared to the August 2022 study.  He is asymptomatic from his disease and unless there has been significant progression of lymphadenopathy we will likely pursue an initial course of observation/surveillance.    *History of prostate cancer  Patient underwent prostatectomy in 2015  He has had an undetectable PSA ever since prostatectomy, recently followed on an annual basis by his PCP.    *CKD3a  Baseline creatinine in the 1.0-1.3 range  Labs today with creatinine of 1.5 which is elevated above prior baseline level.  Patient does continue on spironolactone and Edarbi.  I am contacting the patient's cardiologist Dr. Tinsley regarding the rising creatinine to see if he wants to adjust any of his medications.  We will recheck labs in 3 weeks at return visit.    *Coronary artery disease  *History of TIA  *Obstructive sleep apnea (on CPAP)  *Hepatic steatosis  *Hypertension  *Hyperlipidemia      Plan:  Patient with new diagnosis of CLL.  Staging pending upcoming CT evaluation  Additional labs pending today with peripheral blood CLL FISH panel and IGHV mutation analysis  In 2 weeks CT chest abdomen pelvis (without IV contrast).  Request CT chest to be compared to prior CT chest from [facility] 8/23/2022.  I am forwarding patient's labs today to Dr. Tinsley in regards to rising creatinine and potential need for medication adjustment  MD visit in approximately 3 weeks with CBC, CMP, LDH.  We will review the above laboratory and CT results and discuss recommendations for management of newly diagnosed CLL, likely with a course of initial observation/surveillance.      I did spend 45 minutes total time caring for the patient today, time spent in review of records, face-to-face consultation, coordination of care, placement of orders, completion of  documentation.

## 2025-05-15 ENCOUNTER — OFFICE VISIT (OUTPATIENT)
Dept: ONCOLOGY | Facility: CLINIC | Age: 80
End: 2025-05-15
Payer: MEDICARE

## 2025-05-15 ENCOUNTER — LAB (OUTPATIENT)
Dept: LAB | Facility: HOSPITAL | Age: 80
End: 2025-05-15
Payer: MEDICARE

## 2025-05-15 VITALS
SYSTOLIC BLOOD PRESSURE: 139 MMHG | OXYGEN SATURATION: 95 % | DIASTOLIC BLOOD PRESSURE: 80 MMHG | WEIGHT: 315 LBS | TEMPERATURE: 98 F | HEIGHT: 70 IN | HEART RATE: 59 BPM | BODY MASS INDEX: 45.1 KG/M2 | RESPIRATION RATE: 17 BRPM

## 2025-05-15 DIAGNOSIS — C91.10 CLL (CHRONIC LYMPHOCYTIC LEUKEMIA): Primary | ICD-10-CM

## 2025-05-15 LAB
ALBUMIN SERPL-MCNC: 4.3 G/DL (ref 3.5–5.2)
ALBUMIN/GLOB SERPL: 1.7 G/DL
ALP SERPL-CCNC: 112 U/L (ref 39–117)
ALT SERPL W P-5'-P-CCNC: 33 U/L (ref 1–41)
ANION GAP SERPL CALCULATED.3IONS-SCNC: 8.7 MMOL/L (ref 5–15)
AST SERPL-CCNC: 27 U/L (ref 1–40)
BASOPHILS # BLD AUTO: 0.05 10*3/MM3 (ref 0–0.2)
BASOPHILS NFR BLD AUTO: 0.3 % (ref 0–1.5)
BILIRUB SERPL-MCNC: 0.3 MG/DL (ref 0–1.2)
BUN SERPL-MCNC: 21 MG/DL (ref 8–23)
BUN/CREAT SERPL: 14 (ref 7–25)
CALCIUM SPEC-SCNC: 9.4 MG/DL (ref 8.6–10.5)
CHLORIDE SERPL-SCNC: 109 MMOL/L (ref 98–107)
CO2 SERPL-SCNC: 27.3 MMOL/L (ref 22–29)
CREAT SERPL-MCNC: 1.5 MG/DL (ref 0.76–1.27)
DEPRECATED RDW RBC AUTO: 46.8 FL (ref 37–54)
EGFRCR SERPLBLD CKD-EPI 2021: 47.7 ML/MIN/1.73
EOSINOPHIL # BLD AUTO: 0.34 10*3/MM3 (ref 0–0.4)
EOSINOPHIL NFR BLD AUTO: 2 % (ref 0.3–6.2)
ERYTHROCYTE [DISTWIDTH] IN BLOOD BY AUTOMATED COUNT: 14.6 % (ref 12.3–15.4)
GLOBULIN UR ELPH-MCNC: 2.5 GM/DL
GLUCOSE SERPL-MCNC: 93 MG/DL (ref 65–99)
HCT VFR BLD AUTO: 41.5 % (ref 37.5–51)
HGB BLD-MCNC: 12.9 G/DL (ref 13–17.7)
IMM GRANULOCYTES # BLD AUTO: 0.16 10*3/MM3 (ref 0–0.05)
IMM GRANULOCYTES NFR BLD AUTO: 1 % (ref 0–0.5)
LYMPHOCYTES # BLD AUTO: 6.61 10*3/MM3 (ref 0.7–3.1)
LYMPHOCYTES NFR BLD AUTO: 39.5 % (ref 19.6–45.3)
MCH RBC QN AUTO: 27.7 PG (ref 26.6–33)
MCHC RBC AUTO-ENTMCNC: 31.1 G/DL (ref 31.5–35.7)
MCV RBC AUTO: 89.2 FL (ref 79–97)
MONOCYTES # BLD AUTO: 0.96 10*3/MM3 (ref 0.1–0.9)
MONOCYTES NFR BLD AUTO: 5.7 % (ref 5–12)
NEUTROPHILS NFR BLD AUTO: 51.5 % (ref 42.7–76)
NEUTROPHILS NFR BLD AUTO: 8.63 10*3/MM3 (ref 1.7–7)
NRBC BLD AUTO-RTO: 0 /100 WBC (ref 0–0.2)
PLATELET # BLD AUTO: 232 10*3/MM3 (ref 140–450)
PMV BLD AUTO: 10.3 FL (ref 6–12)
POTASSIUM SERPL-SCNC: 5.1 MMOL/L (ref 3.5–5.2)
PROT SERPL-MCNC: 6.8 G/DL (ref 6–8.5)
RBC # BLD AUTO: 4.65 10*6/MM3 (ref 4.14–5.8)
SODIUM SERPL-SCNC: 145 MMOL/L (ref 136–145)
WBC NRBC COR # BLD AUTO: 16.75 10*3/MM3 (ref 3.4–10.8)

## 2025-05-15 PROCEDURE — 80053 COMPREHEN METABOLIC PANEL: CPT

## 2025-05-15 PROCEDURE — 36415 COLL VENOUS BLD VENIPUNCTURE: CPT

## 2025-05-15 PROCEDURE — 85025 COMPLETE CBC W/AUTO DIFF WBC: CPT

## 2025-05-15 NOTE — LETTER
"May 15, 2025     Yun Ruff MD  [facility]  [address]  [place] [address]    Patient: Jake Alberts   YOB: 1945   Date of Visit: 5/15/2025     Dear Yun Ruff MD:       Thank you for referring Jake Alberts to me for evaluation. Below are the relevant portions of my assessment and plan of care.    If you have questions, please do not hesitate to call me. I look forward to following Jake along with you.         Sincerely,        Jaime Gutierrez MD        CC: MD Matt Lobato John L Jr., MD  05/15/25 4476  Sign when Signing Visit  Chief Complaint  CLL    Subjective       History of Present Illness    Patient returns today in follow-up from his initial consultation with laboratory studies to review.  In the interval since his last visit, he has no new complaints.  He does continue with osteoarthritic pain in his knees.  He denies any fevers or night sweats.  He does have some mild fatigue however this is relatively unchanged.      Objective  Vital Signs:   /80   Pulse 59   Temp 98 °F (36.7 °C)   Resp 17   Ht 177.8 cm (70\")   Wt (!) 156 kg (344 lb)   SpO2 95%   BMI 49.36 kg/m²     Physical Exam  Constitutional:       Appearance: He is well-developed. He is obese.   Eyes:      Conjunctiva/sclera: Conjunctivae normal.   Neck:      Thyroid: No thyromegaly.   Cardiovascular:      Rate and Rhythm: Normal rate and regular rhythm.      Heart sounds: No murmur heard.     No friction rub. No gallop.   Pulmonary:      Effort: No respiratory distress.      Breath sounds: Normal breath sounds.   Abdominal:      General: Bowel sounds are normal. There is no distension.      Palpations: Abdomen is soft.      Tenderness: There is no abdominal tenderness.   Musculoskeletal:      Comments: Trace-1+ bilateral lower extremity edema   Lymphadenopathy:      Head:      Right side of head: No submandibular adenopathy.      Cervical: No cervical adenopathy.      Upper " Body:      Right upper body: No supraclavicular adenopathy.      Left upper body: No supraclavicular adenopathy.   Skin:     General: Skin is warm and dry.      Findings: No rash.      Comments: Patient with multiple seborrheic keratoses, a number of raised keratinizing skin lesions in the forearms bilaterally   Neurological:      Mental Status: He is alert and oriented to person, place, and time.      Cranial Nerves: No cranial nerve deficit.      Motor: No abnormal muscle tone.      Deep Tendon Reflexes: Reflexes normal.   Psychiatric:         Behavior: Behavior normal.        Patient was examined today, unchanged from above    Result Review: Reviewed CBC, CMP from today.  Reviewed multiple labs from 4/16/2025 as outlined below.       Assessment and Plan     *CLL  Patient has had a chronic leukocytosis dating back to at least 1/8/2015.  At that time WBC was 11.22.    WBC has ranged from the 9-17,000 range with normal differential.    He has maintained a normal hemoglobin and platelet count.    CT chest performed on 8/23/2022 following COVID-19 infection performed at outside facility showed evidence of mild mediastinal and hilar lymphadenopathy.  Patient was seen in initial consultation on 4/16/2025.  At that time WBC was 14.45 with differential of 49 segs, 42 lymphs, 6 monocytes.  He had normal hemoglobin at 13.3 and normal platelet count at 187,000.    Additional labs from 4/16/2025 with CRP 1.97, ESR 12, , negative BCR-ABL FISH, negative peripheral blood JAK2 V617F mutation.  Peripheral blood flow cytometry identified a 24% population of CD5 positive monoclonal B cells with CLL/SLL phenotype.  Patient returns today in follow-up from his initial consultation.  We reviewed his peripheral blood flow cytometry result from 4/16/2025 identifying a 24% population of CD5 positive monoclonal B cells consistent with diagnosis of CLL.  Today he has WBC that remains in his prior baseline range at 16.75 with  differential of 52 segs, 39 lymphs, 6 monocytes with absolute lymphocyte count of 6.61.  Hemoglobin remains normal at 12.9 and platelet count normal at 232,000.  He has no palpable lymphadenopathy nor splenomegaly on exam today although abdominal exam limited by body habitus.  The patient's daughter was accompanying him today as well.  We reviewed his diagnosis of CLL.  We discussed that we will need to follow-up the lymphadenopathy seen on the prior scan from August 2022 and we will check CT chest abdomen pelvis in approximately 2 weeks.  We have also sent off additional labs today with peripheral blood CLL FISH panel and IGHV mutation status.  I will see him back in 3 weeks to review all of those results.  We will specifically request the CT scan to be compared to the August 2022 study.  He is asymptomatic from his disease and unless there has been significant progression of lymphadenopathy we will likely pursue an initial course of observation/surveillance.    *History of prostate cancer  Patient underwent prostatectomy in 2015  He has had an undetectable PSA ever since prostatectomy, recently followed on an annual basis by his PCP.    *CKD3a  Baseline creatinine in the 1.0-1.3 range  Labs today with creatinine of 1.5 which is elevated above prior baseline level.  Patient does continue on spironolactone and Edarbi.  I am contacting the patient's cardiologist Dr. Tinsley regarding the rising creatinine to see if he wants to adjust any of his medications.  We will recheck labs in 3 weeks at return visit.    *Coronary artery disease  *History of TIA  *Obstructive sleep apnea (on CPAP)  *Hepatic steatosis  *Hypertension  *Hyperlipidemia      Plan:  Patient with new diagnosis of CLL.  Staging pending upcoming CT evaluation  Additional labs pending today with peripheral blood CLL FISH panel and IGHV mutation analysis  In 2 weeks CT chest abdomen pelvis (without IV contrast).  Request CT chest to be compared to prior CT  chest from [facility] 8/23/2022.  I am forwarding patient's labs today to Dr. Tinsley in regards to rising creatinine and potential need for medication adjustment  MD visit in approximately 3 weeks with CBC, CMP, LDH.  We will review the above laboratory and CT results and discuss recommendations for management of newly diagnosed CLL, likely with a course of initial observation/surveillance.      I did spend 45 minutes total time caring for the patient today, time spent in review of records, face-to-face consultation, coordination of care, placement of orders, completion of documentation.

## 2025-05-21 LAB — IGVH RESULT: NORMAL

## 2025-05-22 LAB — CLL TARGETGENE PANEL RESULT: NORMAL

## 2025-05-28 LAB — CLL TARGETGENE PANEL RESULT: NORMAL

## 2025-05-29 NOTE — PROGRESS NOTES
"Chief Complaint  Stage 0 CLL, right renal lesion    Subjective        History of Present Illness    Patient returns today in follow-up with laboratory studies and CT scans to review.  In the interval since his last visit, he has not experienced any new issues.  His main complaint is that of osteoarthritic pain in his knees.  He notes normal appetite, denies any fevers or night sweats.  He and his daughter do report that he has had some very occasional upper respiratory infections in the past but none recently.      Objective   Vital Signs:   /76   Pulse 58   Temp 97.6 °F (36.4 °C)   Resp 17   Ht 177.8 cm (70\")   Wt (!) 154 kg (340 lb 9.6 oz)   SpO2 94%   BMI 48.87 kg/m²     Physical Exam  Constitutional:       Appearance: He is well-developed. He is obese.   Eyes:      Conjunctiva/sclera: Conjunctivae normal.   Neck:      Thyroid: No thyromegaly.   Cardiovascular:      Rate and Rhythm: Normal rate and regular rhythm.      Heart sounds: No murmur heard.     No friction rub. No gallop.   Pulmonary:      Effort: No respiratory distress.      Breath sounds: Normal breath sounds.   Abdominal:      General: Bowel sounds are normal. There is no distension.      Palpations: Abdomen is soft.      Tenderness: There is no abdominal tenderness.   Musculoskeletal:      Comments: Trace-1+ bilateral lower extremity edema   Lymphadenopathy:      Head:      Right side of head: No submandibular adenopathy.      Cervical: No cervical adenopathy.      Upper Body:      Right upper body: No supraclavicular adenopathy.      Left upper body: No supraclavicular adenopathy.   Skin:     General: Skin is warm and dry.      Findings: No rash.      Comments: Patient with multiple seborrheic keratoses, a number of raised keratinizing skin lesions in the forearms bilaterally   Neurological:      Mental Status: He is alert and oriented to person, place, and time.      Cranial Nerves: No cranial nerve deficit.      Motor: No abnormal " muscle tone.      Deep Tendon Reflexes: Reflexes normal.   Psychiatric:         Behavior: Behavior normal.        Patient was examined today, unchanged from above    Result Review : Reviewed CBC, CMP from today.  Reviewed CLL FISH panel from 5/15/2025.  Reviewed CT chest abdomen pelvis from 5/30/2025.       Assessment and Plan     *CLL, stage 0 (del 13p)  Patient has had a chronic leukocytosis dating back to at least 1/8/2015.  At that time WBC was 11.22.    WBC has ranged from the 9-17,000 range with normal differential.    He has maintained a normal hemoglobin and platelet count.    CT chest performed on 8/23/2022 following COVID-19 infection performed at outside facility showed evidence of mild mediastinal and hilar lymphadenopathy.  Patient was seen in initial consultation on 4/16/2025.  At that time WBC was 14.45 with differential of 49 segs, 42 lymphs, 6 monocytes.  He had normal hemoglobin at 13.3 and normal platelet count at 187,000.    Additional labs from 4/16/2025 with CRP 1.97, ESR 12, , negative BCR-ABL FISH, negative peripheral blood JAK2 V617F mutation.  Peripheral blood flow cytometry identified a 24% population of CD5 positive monoclonal B cells with CLL/SLL phenotype.  Additional labs from 5/15/2025 with IgVH analysis with noninterpretable result.  CLL FISH analysis with deletion 13 q (favorable prognosis)  CT chest abdomen pelvis on 5/30/2025 with no evidence of mediastinal lymphadenopathy.  Notation of borderline enlarged nonspecific inguinal lymph nodes on the right 2.0 x 1.5 cm and left 2.2 x 1.4 cm.  Indeterminate right renal lesion (see below).  Patient therefore with no significant lymphadenopathy (borderline inguinal lymph nodes), no evidence of splenomegaly, no anemia or thrombocytopenia.  Patient felt to have initial stage 0 disease with recommendation to pursue course of initial observation/surveillance  Patient returns today in follow-up regarding his newly diagnosed CLL.  We  reviewed his FISH panel which showed deletion 13q, favorable prognostic indicator.  The I GVH mutation analysis yielded an uninterpretable result.  CT scans did not show any residual mediastinal lymphadenopathy.  There were however some borderline sized inguinal lymph nodes.  He has no evidence of anemia, thrombocytopenia, splenomegaly.  I would stage him as stage 0 given the borderline nature of the inguinal nodes and possibility that these could be reactive in nature.  I have recommended initial course of observation/surveillance and the patient agrees.  There does not appear to be any indication for treatment of his disease at this time.  For now he will return here in 3 months for further clinical follow-up.  We did discuss today's increased risk for skin cancers and I have asked him to establish care with a local dermatologist in [facility].  We also discussed increased risk for infection with CLL and we are obtaining baseline quantitative immunoglobulins today.    *Right renal lesion  CT chest abdomen pelvis on 5/30/2025 showed an indeterminate right renal lesion 3.1 cm exophytic mass versus complex cyst arising from the lower pole of the right kidney.  Findings concerning for renal cell carcinoma, recommended MRI for further evaluation.  I reviewed the CT findings with the patient and his daughter today.  We will proceed with abdominal MRI in the next 1 to 2 weeks and I will notify him of the result.  If a suspicious solid mass is identified we will certainly need to refer him back to Dr. Mohan in urology.    *History of prostate cancer  Patient underwent prostatectomy in 2015  He has had an undetectable PSA ever since prostatectomy, recently followed on an annual basis by his PCP.    *CKD3a  Baseline creatinine in the 1.0-1.3 range  Labs today with creatinine of 1.5 which is elevated above prior baseline level.  Patient does continue on spironolactone and Edarbi.  I am contacting the patient's cardiologist   Laureano regarding the rising creatinine to see if he wants to adjust any of his medications.  We will recheck labs in 3 weeks at return visit.    *Coronary artery disease  *History of TIA  *Obstructive sleep apnea (on CPAP)  *Hepatic steatosis  *Hypertension  *Hyperlipidemia      Plan:  Patient with stage 0 CLL.  Patient continuing on initial course of observation  Additional labs pending today with quantitative immunoglobulins  In 1 to 2 weeks MRI abdomen to further evaluate right renal lesion seen on CT.  If suspicious lesion identified patient will need [facility]  MD visit in 3 months with CBC, CMP

## 2025-05-30 ENCOUNTER — HOSPITAL ENCOUNTER (OUTPATIENT)
Dept: PET IMAGING | Facility: HOSPITAL | Age: 80
Discharge: HOME | End: 2025-05-30
Payer: MEDICARE

## 2025-05-30 DIAGNOSIS — C91.10 CLL (CHRONIC LYMPHOCYTIC LEUKEMIA): ICD-10-CM

## 2025-05-30 PROCEDURE — 71250 CT THORAX DX C-: CPT

## 2025-05-30 PROCEDURE — 74176 CT ABD & PELVIS W/O CONTRAST: CPT

## 2025-06-04 ENCOUNTER — LAB (OUTPATIENT)
Dept: LAB | Facility: HOSPITAL | Age: 80
End: 2025-06-04
Payer: MEDICARE

## 2025-06-04 ENCOUNTER — OFFICE VISIT (OUTPATIENT)
Dept: ONCOLOGY | Facility: CLINIC | Age: 80
End: 2025-06-04
Payer: MEDICARE

## 2025-06-04 VITALS
OXYGEN SATURATION: 94 % | WEIGHT: 315 LBS | RESPIRATION RATE: 17 BRPM | HEIGHT: 70 IN | HEART RATE: 58 BPM | DIASTOLIC BLOOD PRESSURE: 76 MMHG | TEMPERATURE: 97.6 F | BODY MASS INDEX: 45.1 KG/M2 | SYSTOLIC BLOOD PRESSURE: 123 MMHG

## 2025-06-04 DIAGNOSIS — N28.9 RENAL LESION: Primary | ICD-10-CM

## 2025-06-04 DIAGNOSIS — C91.10 CLL (CHRONIC LYMPHOCYTIC LEUKEMIA): ICD-10-CM

## 2025-06-04 LAB
ALBUMIN SERPL-MCNC: 4.1 G/DL (ref 3.5–5.2)
ALBUMIN/GLOB SERPL: 1.6 G/DL
ALP SERPL-CCNC: 106 U/L (ref 39–117)
ALT SERPL W P-5'-P-CCNC: 22 U/L (ref 1–41)
ANION GAP SERPL CALCULATED.3IONS-SCNC: 8 MMOL/L (ref 5–15)
AST SERPL-CCNC: 19 U/L (ref 1–40)
BASOPHILS # BLD AUTO: 0.05 10*3/MM3 (ref 0–0.2)
BASOPHILS NFR BLD AUTO: 0.4 % (ref 0–1.5)
BILIRUB SERPL-MCNC: 0.3 MG/DL (ref 0–1.2)
BUN SERPL-MCNC: 20.4 MG/DL (ref 8–23)
BUN/CREAT SERPL: 15.5 (ref 7–25)
CALCIUM SPEC-SCNC: 9.5 MG/DL (ref 8.6–10.5)
CHLORIDE SERPL-SCNC: 107 MMOL/L (ref 98–107)
CO2 SERPL-SCNC: 27 MMOL/L (ref 22–29)
CREAT SERPL-MCNC: 1.32 MG/DL (ref 0.76–1.27)
DEPRECATED RDW RBC AUTO: 46.1 FL (ref 37–54)
EGFRCR SERPLBLD CKD-EPI 2021: 55.6 ML/MIN/1.73
EOSINOPHIL # BLD AUTO: 0.27 10*3/MM3 (ref 0–0.4)
EOSINOPHIL NFR BLD AUTO: 1.9 % (ref 0.3–6.2)
ERYTHROCYTE [DISTWIDTH] IN BLOOD BY AUTOMATED COUNT: 14.3 % (ref 12.3–15.4)
GLOBULIN UR ELPH-MCNC: 2.6 GM/DL
GLUCOSE SERPL-MCNC: 89 MG/DL (ref 65–99)
HCT VFR BLD AUTO: 42.6 % (ref 37.5–51)
HGB BLD-MCNC: 12.9 G/DL (ref 13–17.7)
IGA1 MFR SER: 194 MG/DL (ref 70–400)
IGG1 SER-MCNC: 896 MG/DL (ref 700–1600)
IGM SERPL-MCNC: 49 MG/DL (ref 40–230)
IMM GRANULOCYTES # BLD AUTO: 0.09 10*3/MM3 (ref 0–0.05)
IMM GRANULOCYTES NFR BLD AUTO: 0.6 % (ref 0–0.5)
LDH SERPL-CCNC: 181 U/L (ref 135–225)
LYMPHOCYTES # BLD AUTO: 5.83 10*3/MM3 (ref 0.7–3.1)
LYMPHOCYTES NFR BLD AUTO: 41.4 % (ref 19.6–45.3)
MCH RBC QN AUTO: 27.1 PG (ref 26.6–33)
MCHC RBC AUTO-ENTMCNC: 30.3 G/DL (ref 31.5–35.7)
MCV RBC AUTO: 89.5 FL (ref 79–97)
MONOCYTES # BLD AUTO: 0.99 10*3/MM3 (ref 0.1–0.9)
MONOCYTES NFR BLD AUTO: 7 % (ref 5–12)
NEUTROPHILS NFR BLD AUTO: 48.7 % (ref 42.7–76)
NEUTROPHILS NFR BLD AUTO: 6.85 10*3/MM3 (ref 1.7–7)
NRBC BLD AUTO-RTO: 0 /100 WBC (ref 0–0.2)
PLATELET # BLD AUTO: 219 10*3/MM3 (ref 140–450)
PMV BLD AUTO: 10.5 FL (ref 6–12)
POTASSIUM SERPL-SCNC: 5.2 MMOL/L (ref 3.5–5.2)
PROT SERPL-MCNC: 6.7 G/DL (ref 6–8.5)
RBC # BLD AUTO: 4.76 10*6/MM3 (ref 4.14–5.8)
SODIUM SERPL-SCNC: 142 MMOL/L (ref 136–145)
WBC NRBC COR # BLD AUTO: 14.08 10*3/MM3 (ref 3.4–10.8)

## 2025-06-04 PROCEDURE — 3074F SYST BP LT 130 MM HG: CPT | Performed by: INTERNAL MEDICINE

## 2025-06-04 PROCEDURE — 99214 OFFICE O/P EST MOD 30 MIN: CPT | Performed by: INTERNAL MEDICINE

## 2025-06-04 PROCEDURE — 1159F MED LIST DOCD IN RCRD: CPT | Performed by: INTERNAL MEDICINE

## 2025-06-04 PROCEDURE — 1160F RVW MEDS BY RX/DR IN RCRD: CPT | Performed by: INTERNAL MEDICINE

## 2025-06-04 PROCEDURE — 3078F DIAST BP <80 MM HG: CPT | Performed by: INTERNAL MEDICINE

## 2025-06-04 PROCEDURE — 36415 COLL VENOUS BLD VENIPUNCTURE: CPT

## 2025-06-04 PROCEDURE — 1126F AMNT PAIN NOTED NONE PRSNT: CPT | Performed by: INTERNAL MEDICINE

## 2025-06-04 PROCEDURE — 80053 COMPREHEN METABOLIC PANEL: CPT

## 2025-06-04 PROCEDURE — 83615 LACTATE (LD) (LDH) ENZYME: CPT

## 2025-06-04 PROCEDURE — 85025 COMPLETE CBC W/AUTO DIFF WBC: CPT

## 2025-06-04 PROCEDURE — 82784 ASSAY IGA/IGD/IGG/IGM EACH: CPT | Performed by: INTERNAL MEDICINE

## 2025-06-24 ENCOUNTER — HOSPITAL ENCOUNTER (OUTPATIENT)
Dept: MRI IMAGING | Facility: HOSPITAL | Age: 80
Discharge: HOME | End: 2025-06-24
Admitting: INTERNAL MEDICINE
Payer: MEDICARE

## 2025-06-24 DIAGNOSIS — C91.10 CLL (CHRONIC LYMPHOCYTIC LEUKEMIA): ICD-10-CM

## 2025-06-24 DIAGNOSIS — N28.9 RENAL LESION: ICD-10-CM

## 2025-06-24 PROCEDURE — 25010000002 GADOTERIDOL PER 1 ML: Performed by: INTERNAL MEDICINE

## 2025-06-24 PROCEDURE — 74183 MRI ABD W/O CNTR FLWD CNTR: CPT

## 2025-06-24 PROCEDURE — A9579 GAD-BASE MR CONTRAST NOS,1ML: HCPCS | Performed by: INTERNAL MEDICINE

## 2025-06-24 RX ORDER — GADOTERIDOL 279.3 MG/ML
20 INJECTION INTRAVENOUS
Status: COMPLETED | OUTPATIENT
Start: 2025-06-24 | End: 2025-06-24

## 2025-06-24 RX ADMIN — GADOTERIDOL 20 ML: 279.3 INJECTION, SOLUTION INTRAVENOUS at 14:52

## 2025-06-25 ENCOUNTER — TELEPHONE (OUTPATIENT)
Dept: ONCOLOGY | Facility: CLINIC | Age: 80
End: 2025-06-25
Payer: MEDICARE

## 2025-06-25 DIAGNOSIS — N28.9 RENAL LESION: Primary | ICD-10-CM

## 2025-06-25 NOTE — TELEPHONE ENCOUNTER
Phoned patient to inform him that his known renal lesion did have a suspicious appearance on his recent MRI, and per Dr. Gutierrez, we are referring him back to Dr. Fleming with urology for further management. Patient v/u. Referral placed.

## (undated) DEVICE — BALN PRESS/REG SPHINCT/PENILE PRESS/REG AMS800 61TO70CM H20

## (undated) DEVICE — TUBING, SUCTION, 1/4" X 20', STRAIGHT: Brand: MEDLINE INDUSTRIES, INC.

## (undated) DEVICE — RETR STAY HK ELAS SHRP 5MM PK/8

## (undated) DEVICE — SUT GUT CHRM 4/0 RB1 27IN U203H

## (undated) DEVICE — COVER,MAYO STAND,STERILE: Brand: MEDLINE

## (undated) DEVICE — RETR DEEP SCROTAL SKW

## (undated) DEVICE — SYR LUERLOK 30CC

## (undated) DEVICE — DECANT BG O JET

## (undated) DEVICE — GLV SURG BIOGEL LTX PF 7

## (undated) DEVICE — CATH DIAG IMPULSE FL3.5 5F 100CM

## (undated) DEVICE — GW EMR FIX EXCHG J STD .035 3MM 260CM

## (undated) DEVICE — IRRIGATOR BULB ASEPTO 60CC STRL

## (undated) DEVICE — SKIN PREP TRAY W/CHG: Brand: MEDLINE INDUSTRIES, INC.

## (undated) DEVICE — TP UMB COTN 1/8X36 U12T

## (undated) DEVICE — BANDAGE,GAUZE,BULKEE II,4.5"X4.1YD,STRL: Brand: MEDLINE

## (undated) DEVICE — GLIDESHEATH BASIC HYDROPHILIC COATED INTRODUCER SHEATH: Brand: GLIDESHEATH

## (undated) DEVICE — SOL NACL 0.9PCT 1000ML

## (undated) DEVICE — CATH FOL SIMPLYLATEX SILELAST 14F 17IN

## (undated) DEVICE — SUT MNCRYL PLS ANTIB UD 4/0 PS2 18IN

## (undated) DEVICE — PK CATH CARD 40

## (undated) DEVICE — ADHS SKIN DERMABOND TOP ADVANCED

## (undated) DEVICE — CATH DIAG IMPULSE FR5 5F 100CM

## (undated) DEVICE — ANTIBACTERIAL UNDYED BRAIDED (POLYGLACTIN 910), SYNTHETIC ABSORBABLE SUTURE: Brand: COATED VICRYL

## (undated) DEVICE — DRP BLDR SUSP LINGEMAN

## (undated) DEVICE — LOU MINOR PROCEDURE: Brand: MEDLINE INDUSTRIES, INC.

## (undated) DEVICE — KT MANIFLD CARDIAC

## (undated) DEVICE — BND PRESS RADL COMFRT 14IN STRL

## (undated) DEVICE — KT ACCSR PENILE AMS800 IMP/CONN

## (undated) DEVICE — CATH DIAG IMPULSE PIG 5F 100CM